# Patient Record
Sex: FEMALE | Race: WHITE | NOT HISPANIC OR LATINO | Employment: FULL TIME | URBAN - METROPOLITAN AREA
[De-identification: names, ages, dates, MRNs, and addresses within clinical notes are randomized per-mention and may not be internally consistent; named-entity substitution may affect disease eponyms.]

---

## 2017-10-02 ENCOUNTER — OFFICE VISIT (OUTPATIENT)
Dept: MEDICAL GROUP | Facility: PHYSICIAN GROUP | Age: 27
End: 2017-10-02
Payer: COMMERCIAL

## 2017-10-02 VITALS
HEIGHT: 68 IN | SYSTOLIC BLOOD PRESSURE: 128 MMHG | TEMPERATURE: 98.5 F | WEIGHT: 146 LBS | DIASTOLIC BLOOD PRESSURE: 90 MMHG | HEART RATE: 100 BPM | BODY MASS INDEX: 22.13 KG/M2 | OXYGEN SATURATION: 100 %

## 2017-10-02 DIAGNOSIS — Z78.9 USES BIRTH CONTROL: ICD-10-CM

## 2017-10-02 DIAGNOSIS — Z86.69 HISTORY OF MIGRAINE HEADACHES: ICD-10-CM

## 2017-10-02 DIAGNOSIS — R03.0 ELEVATED BLOOD-PRESSURE READING WITHOUT DIAGNOSIS OF HYPERTENSION: ICD-10-CM

## 2017-10-02 PROCEDURE — 99204 OFFICE O/P NEW MOD 45 MIN: CPT | Performed by: PHYSICIAN ASSISTANT

## 2017-10-02 RX ORDER — RIZATRIPTAN BENZOATE 10 MG/1
10 TABLET ORAL
COMMUNITY
End: 2017-10-02 | Stop reason: SDUPTHER

## 2017-10-02 RX ORDER — RIZATRIPTAN BENZOATE 10 MG/1
10 TABLET ORAL
Qty: 10 TAB | Refills: 3 | Status: SHIPPED | OUTPATIENT
Start: 2017-10-02 | End: 2018-01-13 | Stop reason: SDUPTHER

## 2017-10-02 ASSESSMENT — ENCOUNTER SYMPTOMS
HEADACHES: 1
CONSTIPATION: 0
SHORTNESS OF BREATH: 0
DIARRHEA: 0
DIZZINESS: 0
BACK PAIN: 0
WEIGHT LOSS: 0
BLURRED VISION: 0
EYE PAIN: 0
ABDOMINAL PAIN: 0
PSYCHIATRIC NEGATIVE: 1
DOUBLE VISION: 0
VOMITING: 0
NECK PAIN: 0
NAUSEA: 0

## 2017-10-02 ASSESSMENT — PATIENT HEALTH QUESTIONNAIRE - PHQ9: CLINICAL INTERPRETATION OF PHQ2 SCORE: 0

## 2017-10-02 NOTE — ASSESSMENT & PLAN NOTE
"Patient endorses several blood pressure elevations the last 2 months. The first was when she had some dental work done. Was told her BP was \"140-something\" over 98. Then was in urgent care and it was 140s/90s. Was checked most recently at work and was 159/105. Patient states she occasionally has felt headaches when BP was up. She denies any chest pain, dizziness, SOB, or any visual symptoms. No previous h/o HTN.  "

## 2017-10-02 NOTE — ASSESSMENT & PLAN NOTE
"Started around 2008. Typically experiences 1-2 migraines per month, historically improved with Maxalt 10 mg. Has been out of medication for the last 3 weeks. Since then, has had a total of 3 headaches, for which she's taken Excedrin and Ibuprofen. These medications haven't been successful in completely resolving her headaches. Is requesting refill of her Maxalt. Patient denies any prodromal symptoms. Typically gets nausea and \"flashing lights\" in vision when she gets the headaches.  "

## 2017-10-02 NOTE — PROGRESS NOTES
"Tiffany Francois is a 26 y.o. female here for elevated blood pressure, migraines, birth control refill.  HPI:  Patient recently moved to the area 2 months ago from [place], [place]. She denies having a previous PCP and would like to establish care given recent concerns about her blood pressure and ongoing management of her migraine headaches.    She has the following current medical problems:    Uses birth control  Patient on Lo-Loestrin Fe. Needing refills today.    History of migraine headaches  Started around 2008. Typically experiences 1-2 migraines per month, historically improved with Maxalt 10 mg. Has been out of medication for the last 3 weeks. Since then, has had a total of 3 headaches, for which she's taken Excedrin and Ibuprofen. These medications haven't been successful in completely resolving her headaches. Is requesting refill of her Maxalt. Patient denies any prodromal symptoms. Typically gets nausea and \"flashing lights\" in vision when she gets the headaches.    Elevated blood-pressure reading without diagnosis of hypertension  Patient endorses several blood pressure elevations the last 2 months. The first was when she had some dental work done. Was told her BP was \"140-something\" over 98. Then was in urgent care and it was 140s/90s. Was checked most recently at work and was 159/105. Patient states she occasionally has felt headaches when BP was up. She denies any chest pain, dizziness, SOB, or any visual symptoms. No previous h/o HTN.    Current medicines (including changes today)  Current Outpatient Prescriptions   Medication Sig Dispense Refill   • Norethin-Eth Estrad-Fe Biphas (LO LOESTRIN FE) 1 MG-10 MCG / 10 MCG Tab Take 1 Tab by mouth every day. 28 Tab 11   • rizatriptan (MAXALT) 10 MG tablet Take 1 Tab by mouth Once PRN for Migraine. 10 Tab 3     No current facility-administered medications for this visit.      She  has no past medical history on file.  She  has a past surgical history " "that includes appendectomy.  Social History   Substance Use Topics   • Smoking status: Never Smoker   • Smokeless tobacco: Never Used   • Alcohol use Yes      Comment: occassional - twice monthly at most     Social History     Social History Narrative   • No narrative on file     Family History   Problem Relation Age of Onset   • Hypertension Maternal Aunt    • Diabetes Maternal Uncle    • Hypertension Maternal Uncle    • Diabetes Paternal Grandfather    • Heart Disease Paternal Grandfather    • No Known Problems Mother    • No Known Problems Father      Family Status   Relation Status   • Maternal Aunt    • Maternal Uncle    • Paternal Grandfather    • Mother Alive   • Father Alive       Review of Systems   Constitutional: Negative for weight loss.   Eyes: Negative for blurred vision, double vision and pain.   Respiratory: Negative for shortness of breath.    Cardiovascular: Negative for chest pain.   Gastrointestinal: Negative for abdominal pain, constipation, diarrhea, nausea and vomiting.   Genitourinary: Negative for dysuria and frequency.   Musculoskeletal: Negative for back pain, joint pain and neck pain.   Neurological: Positive for headaches. Negative for dizziness.   Endo/Heme/Allergies: Negative for environmental allergies.   Psychiatric/Behavioral: Negative.           Objective:     Blood pressure 128/90, pulse 100, temperature 36.9 °C (98.5 °F), height 1.727 m (5' 8\"), weight 66.2 kg (146 lb), last menstrual period 04/02/2017, SpO2 100 %, not currently breastfeeding. Body mass index is 22.2 kg/m².  Physical Exam:    Constitutional: Alert, no distress.  Skin: Warm, dry, good turgor, no rashes in visible areas.  Eye: Equal, round and reactive, conjunctiva clear, lids normal.  ENMT: Lips without lesions, good dentition, oropharynx clear.  Neck: Trachea midline, no masses, no thyromegaly. No cervical or submandibular lymphadenopathy.  Respiratory: Unlabored respiratory effort, lungs clear to auscultation, " no wheezes, no ronchi.  Cardiovascular: Normal S1, S2, no murmur, no edema.  Abdomen: Normoactive bowel sounds. Soft, non-tender, no masses, no hepatosplenomegaly.  Psych: Alert and oriented x3, normal affect and mood.        Assessment and Plan:   The following treatment plan was discussed    1. Elevated blood-pressure reading without diagnosis of hypertension  BP today in the office is 128/90. Will have patient start recording BP at home every day and bring readings into the office in about 1 month. If persistently elevated, will plan to start on BP medication at that time.    2. History of migraine headaches  Patient's Maxalt refilled. Patient advised that if she begins having more than a few headaches per month, should let us know so we can discuss preventative treatment.  - rizatriptan (MAXALT) 10 MG tablet; Take 1 Tab by mouth Once PRN for Migraine.  Dispense: 10 Tab; Refill: 3    3. Uses birth control  Patient has done well on Lo Loestrin Fe and would like this refilled. She does have migraine with visual aura, which does increase stroke risk, but is nonsmoker and on BC with very low-dose estrogen component. Will continue for now but will likely recommend alternative BC method if blood pressure remains elevated.   - Norethin-Eth Estrad-Fe Biphas (LO LOESTRIN FE) 1 MG-10 MCG / 10 MCG Tab; Take 1 Tab by mouth every day.  Dispense: 28 Tab; Refill: 11      Followup: Return in about 1 month (around 11/2/2017) for BP f/u; Short.    Teodora Morgan P.A.-C.

## 2018-01-13 DIAGNOSIS — Z86.69 HISTORY OF MIGRAINE HEADACHES: ICD-10-CM

## 2018-01-15 RX ORDER — RIZATRIPTAN BENZOATE 10 MG/1
TABLET ORAL
Qty: 10 TAB | Refills: 3 | Status: SHIPPED | OUTPATIENT
Start: 2018-01-15 | End: 2018-07-25 | Stop reason: SDUPTHER

## 2018-02-16 ENCOUNTER — OFFICE VISIT (OUTPATIENT)
Dept: MEDICAL GROUP | Facility: PHYSICIAN GROUP | Age: 28
End: 2018-02-16
Payer: COMMERCIAL

## 2018-02-16 VITALS
SYSTOLIC BLOOD PRESSURE: 150 MMHG | WEIGHT: 145 LBS | TEMPERATURE: 98.6 F | BODY MASS INDEX: 21.98 KG/M2 | HEART RATE: 110 BPM | HEIGHT: 68 IN | OXYGEN SATURATION: 95 % | DIASTOLIC BLOOD PRESSURE: 110 MMHG

## 2018-02-16 DIAGNOSIS — I10 ESSENTIAL HYPERTENSION: ICD-10-CM

## 2018-02-16 PROCEDURE — 99214 OFFICE O/P EST MOD 30 MIN: CPT | Performed by: PHYSICIAN ASSISTANT

## 2018-02-16 RX ORDER — LISINOPRIL AND HYDROCHLOROTHIAZIDE 12.5; 1 MG/1; MG/1
1 TABLET ORAL DAILY
Qty: 30 TAB | Refills: 2 | Status: SHIPPED | OUTPATIENT
Start: 2018-02-16 | End: 2019-08-13

## 2018-02-16 NOTE — PATIENT INSTRUCTIONS
Hydrochlorothiazide, HCTZ; Lisinopril tablets  What is this medicine?  HYDROCHLOROTHIAZIDE; LISINOPRIL (maria fernanda droe klor oh THYE a zide; lyse IN oh pril) is a combination of a diuretic and an ACE inhibitor. It is used to treat high blood pressure.  This medicine may be used for other purposes; ask your health care provider or pharmacist if you have questions.  COMMON BRAND NAME(S): Prinzide, Zestoretic  What should I tell my health care provider before I take this medicine?  They need to know if you have any of these conditions:  -bone marrow disease  -decreased urine  -heart or blood vessel disease  -if you are on a special diet like a low salt diet  -immune system problems, like lupus  -kidney disease  -liver disease  -previous swelling of the tongue, face, or lips with difficulty breathing, difficulty swallowing, hoarseness, or tightening of the throat  -recent heart attack or stroke  -an unusual or allergic reaction to lisinopril, hydrochlorothiazide, sulfa drugs, other medicines, insect venom, foods, dyes, or preservatives  -pregnant or trying to get pregnant  -breast-feeding  How should I use this medicine?  Take this medicine by mouth with a glass of water. Follow the directions on the prescription label. You can take it with or without food. If it upsets your stomach, take it with food. Take your medicine at regular intervals. Do not take it more often than directed. Do not stop taking except on your doctor's advice.  Talk to your pediatrician regarding the use of this medicine in children. Special care may be needed.  Overdosage: If you think you have taken too much of this medicine contact a poison control center or emergency room at once.  NOTE: This medicine is only for you. Do not share this medicine with others.  What if I miss a dose?  If you miss a dose, take it as soon as you can. If it is almost time for your next dose, take only that dose. Do not take double or extra doses.  What may interact with  this medicine?  -barbiturates like phenobarbital  -blood pressure medicines  -corticosteroids like prednisone  -diabetic medications  -diuretics, especially triamterene, spironolactone or amiloride  -lithium  -NSAIDs like ibuprofen  -potassium salts or potassium supplements  -prescription pain medicines  -skeletal muscle relaxants like tubocurarine  -some cholesterol lowering medications like cholestyramine or colestipol  This list may not describe all possible interactions. Give your health care provider a list of all the medicines, herbs, non-prescription drugs, or dietary supplements you use. Also tell them if you smoke, drink alcohol, or use illegal drugs. Some items may interact with your medicine.  What should I watch for while using this medicine?  Visit your doctor or health care professional for regular checks on your progress. Check your blood pressure as directed. Ask your doctor or health care professional what your blood pressure should be and when you should contact him or her. Call your doctor or health care professional if you notice an irregular or fast heart beat.  You must not get dehydrated. Ask your doctor or health care professional how much fluid you need to drink a day. Check with him or her if you get an attack of severe diarrhea, nausea and vomiting, or if you sweat a lot. The loss of too much body fluid can make it dangerous for you to take this medicine.  Women should inform their doctor if they wish to become pregnant or think they might be pregnant. There is a potential for serious side effects to an unborn child. Talk to your health care professional or pharmacist for more information.  You may get drowsy or dizzy. Do not drive, use machinery, or do anything that needs mental alertness until you know how this drug affects you. Do not stand or sit up quickly, especially if you are an older patient. This reduces the risk of dizzy or fainting spells. Alcohol can make you more drowsy and  dizzy. Avoid alcoholic drinks.  This medicine may affect your blood sugar level. If you have diabetes, check with your doctor or health care professional before changing the dose of your diabetic medicine.  Avoid salt substitutes unless you are told otherwise by your doctor or health care professional.  This medicine can make you more sensitive to the sun. Keep out of the sun. If you cannot avoid being in the sun, wear protective clothing and use sunscreen. Do not use sun lamps or tanning beds/booths.  Do not treat yourself for coughs, colds, or pain while you are taking this medicine without asking your doctor or health care professional for advice. Some ingredients may increase your blood pressure.  What side effects may I notice from receiving this medicine?  Side effects that you should report to your doctor or health care professional as soon as possible:  -changes in vision  -confusion, dizziness, light headedness or fainting spells  -decreased amount of urine passed  -difficulty breathing or swallowing, hoarseness, or tightening of the throat  -eye pain  -fast or irregular heart beat, palpitations, or chest pain  -muscle cramps  -nausea and vomiting  -persistent dry cough  -redness, blistering, peeling or loosening of the skin, including inside the mouth  -stomach pain  -swelling of your face, lips, tongue, hands, or feet  -unusual rash, bleeding or bruising, or pinpoint red spots on the skin  -worsened gout pain  -yellowing of the eyes or skin  Side effects that usually do not require medical attention (report to your doctor or health care professional if they continue or are bothersome):  -change in sex drive or performance  -cough  -headache  This list may not describe all possible side effects. Call your doctor for medical advice about side effects. You may report side effects to FDA at 3-502-FDA-9618.  Where should I keep my medicine?  Keep out of the reach of children.  Store at room temperature between  20 and 25 degrees C (68 and 77 degrees F). Protect from moisture and excessive light. Keep container tightly closed. Throw away any unused medicine after the expiration date.  NOTE: This sheet is a summary. It may not cover all possible information. If you have questions about this medicine, talk to your doctor, pharmacist, or health care provider.  © 2014, [employer]. (9/7/2011 1:33:52 PM)

## 2018-02-16 NOTE — PROGRESS NOTES
"Subjective:   Tiffany Francois is a 27 y.o. female here today for follow-up on BP. Is an established patient of mine.      HPI:    Patient presents to the office today for a follow-up on her blood pressures. At the last visit, I had asked her to start checking at home. She states that she has noticed persistent elevation especially over the past week. She started checking more frequently when she was having flushing and dizzy episodes. Has also been having more frequent headaches. Is unsure if these are her normal migraines are due to the elevated blood pressure.Has found that her blood pressure has been running anywhere between 140-160 systolic over around 100 diastolic. She is not currently on any medication. She denies any additional symptoms such as blurry vision, chest pain, palpitations, or shortness of breath. Does endorse family history of hypertension.       Current medicines (including changes today)  Current Outpatient Prescriptions   Medication Sig Dispense Refill   • lisinopril-hydrochlorothiazide (PRINZIDE, ZESTORETIC) 10-12.5 MG per tablet Take 1 Tab by mouth every day. 30 Tab 2   • rizatriptan (MAXALT) 10 MG tablet TAKE 1 TABLET BY MOUTH ONCE AS NEEDED FOR MIGRAINE 10 Tab 3   • Norethin-Eth Estrad-Fe Biphas (LO LOESTRIN FE) 1 MG-10 MCG / 10 MCG Tab Take 1 Tab by mouth every day. 28 Tab 11     No current facility-administered medications for this visit.      She  has no past medical history on file.    ROS  As per HPI.       Objective:     Blood pressure 150/110, pulse (!) 110, temperature 37 °C (98.6 °F), height 1.727 m (5' 8\"), weight 65.8 kg (145 lb), SpO2 95 %. Body mass index is 22.05 kg/m².   Physical Exam:  Constitutional: Alert, well-appearing, no distress.  Skin: No rashes in visible areas.  Eye: Pupils are equal and round, conjunctiva clear, lids normal.  ENMT: Lips without lesions, moist mucus membranes.  Neck: No masses. No submandibular or cervical lymphadenopathy. No palpable " thyromegaly.  Respiratory: Unlabored respiratory effort, lungs clear to auscultation, no wheezes, no rhonchi.  Cardiovascular: Normal S1, S2, no murmur, no lower extremity edema.      Assessment and Plan:   The following treatment plan was discussed    1. Essential hypertension  New diagnosis of hypertension. Blood pressure initially on arrival was 150/110. On recheck by myself, was 144/110. Explained to patient that she needs to be started on hypertensive medication. I'm going to initiate lisinopril-hydrochlorothiazide 10/12.5 mg daily. She should continue checking blood pressures at home and bring to next office visit. We'll have her follow-up for MA blood pressure recheck in 2 weeks with follow-up in the office with myself in 6 weeks. I see that she has never had any type of screening lab work done, so basic panel was ordered. She is advised to have this done prior to her next office visit.  - lisinopril-hydrochlorothiazide (PRINZIDE, ZESTORETIC) 10-12.5 MG per tablet; Take 1 Tab by mouth every day.  Dispense: 30 Tab; Refill: 2  - CBC WITH DIFFERENTIAL; Future  - COMP METABOLIC PANEL; Future  - LIPID PROFILE; Future  - TSH WITH REFLEX TO FT4; Future      Followup: Return in about 2 weeks (around 3/2/2018) for MA BP re-check; Short.    Teodora Morgan P.A.-C.

## 2018-03-02 ENCOUNTER — HOSPITAL ENCOUNTER (OUTPATIENT)
Dept: LAB | Facility: MEDICAL CENTER | Age: 28
End: 2018-03-02
Attending: PHYSICIAN ASSISTANT
Payer: COMMERCIAL

## 2018-03-02 ENCOUNTER — TELEPHONE (OUTPATIENT)
Dept: MEDICAL GROUP | Facility: PHYSICIAN GROUP | Age: 28
End: 2018-03-02

## 2018-03-02 ENCOUNTER — NON-PROVIDER VISIT (OUTPATIENT)
Dept: MEDICAL GROUP | Facility: PHYSICIAN GROUP | Age: 28
End: 2018-03-02
Payer: COMMERCIAL

## 2018-03-02 VITALS — DIASTOLIC BLOOD PRESSURE: 76 MMHG | SYSTOLIC BLOOD PRESSURE: 120 MMHG

## 2018-03-02 DIAGNOSIS — I10 ESSENTIAL HYPERTENSION: ICD-10-CM

## 2018-03-02 LAB
ALBUMIN SERPL BCP-MCNC: 4.8 G/DL (ref 3.2–4.9)
ALBUMIN/GLOB SERPL: 1.5 G/DL
ALP SERPL-CCNC: 81 U/L (ref 30–99)
ALT SERPL-CCNC: 17 U/L (ref 2–50)
ANION GAP SERPL CALC-SCNC: 4 MMOL/L (ref 0–11.9)
AST SERPL-CCNC: 21 U/L (ref 12–45)
BASOPHILS # BLD AUTO: 0.6 % (ref 0–1.8)
BASOPHILS # BLD: 0.03 K/UL (ref 0–0.12)
BILIRUB SERPL-MCNC: 0.3 MG/DL (ref 0.1–1.5)
BUN SERPL-MCNC: 12 MG/DL (ref 8–22)
CALCIUM SERPL-MCNC: 9.8 MG/DL (ref 8.5–10.5)
CHLORIDE SERPL-SCNC: 104 MMOL/L (ref 96–112)
CHOLEST SERPL-MCNC: 151 MG/DL (ref 100–199)
CO2 SERPL-SCNC: 28 MMOL/L (ref 20–33)
CREAT SERPL-MCNC: 0.69 MG/DL (ref 0.5–1.4)
EOSINOPHIL # BLD AUTO: 0.1 K/UL (ref 0–0.51)
EOSINOPHIL NFR BLD: 2 % (ref 0–6.9)
ERYTHROCYTE [DISTWIDTH] IN BLOOD BY AUTOMATED COUNT: 39.6 FL (ref 35.9–50)
GLOBULIN SER CALC-MCNC: 3.2 G/DL (ref 1.9–3.5)
GLUCOSE SERPL-MCNC: 86 MG/DL (ref 65–99)
HCT VFR BLD AUTO: 42.5 % (ref 37–47)
HDLC SERPL-MCNC: 53 MG/DL
HGB BLD-MCNC: 13.5 G/DL (ref 12–16)
IMM GRANULOCYTES # BLD AUTO: 0.01 K/UL (ref 0–0.11)
IMM GRANULOCYTES NFR BLD AUTO: 0.2 % (ref 0–0.9)
LDLC SERPL CALC-MCNC: 73 MG/DL
LYMPHOCYTES # BLD AUTO: 2.11 K/UL (ref 1–4.8)
LYMPHOCYTES NFR BLD: 43 % (ref 22–41)
MCH RBC QN AUTO: 29.3 PG (ref 27–33)
MCHC RBC AUTO-ENTMCNC: 31.8 G/DL (ref 33.6–35)
MCV RBC AUTO: 92.4 FL (ref 81.4–97.8)
MONOCYTES # BLD AUTO: 0.39 K/UL (ref 0–0.85)
MONOCYTES NFR BLD AUTO: 7.9 % (ref 0–13.4)
NEUTROPHILS # BLD AUTO: 2.27 K/UL (ref 2–7.15)
NEUTROPHILS NFR BLD: 46.3 % (ref 44–72)
NRBC # BLD AUTO: 0 K/UL
NRBC BLD-RTO: 0 /100 WBC
PLATELET # BLD AUTO: 247 K/UL (ref 164–446)
PMV BLD AUTO: 11.6 FL (ref 9–12.9)
POTASSIUM SERPL-SCNC: 4.1 MMOL/L (ref 3.6–5.5)
PROT SERPL-MCNC: 8 G/DL (ref 6–8.2)
RBC # BLD AUTO: 4.6 M/UL (ref 4.2–5.4)
SODIUM SERPL-SCNC: 136 MMOL/L (ref 135–145)
TRIGL SERPL-MCNC: 127 MG/DL (ref 0–149)
TSH SERPL DL<=0.005 MIU/L-ACNC: 1.54 UIU/ML (ref 0.38–5.33)
WBC # BLD AUTO: 4.9 K/UL (ref 4.8–10.8)

## 2018-03-02 PROCEDURE — 84443 ASSAY THYROID STIM HORMONE: CPT

## 2018-03-02 PROCEDURE — 36415 COLL VENOUS BLD VENIPUNCTURE: CPT

## 2018-03-02 PROCEDURE — 85025 COMPLETE CBC W/AUTO DIFF WBC: CPT

## 2018-03-02 PROCEDURE — 80061 LIPID PANEL: CPT

## 2018-03-02 PROCEDURE — 80053 COMPREHEN METABOLIC PANEL: CPT

## 2018-03-02 NOTE — PROGRESS NOTES
Tiffany Francois is a 27 y.o. female here for a non-provider visit for BP Check     Vitals:    03/02/18 1031   BP: 120/76     If abnormal, was an in office provider notified today? No was not abnormal notified Teodora Morgan   Routed to PCP? No

## 2018-03-03 NOTE — TELEPHONE ENCOUNTER
----- Message from Teodora Morgan P.A.-C. sent at 3/2/2018  4:36 PM PST -----  Please inform patient that her recent labs show nothing of concern.  Teodora Morgan P.A.-C.

## 2018-03-29 ENCOUNTER — TELEPHONE (OUTPATIENT)
Dept: MEDICAL GROUP | Facility: PHYSICIAN GROUP | Age: 28
End: 2018-03-29

## 2018-03-29 NOTE — TELEPHONE ENCOUNTER
Future Appointments       Provider Department Center    3/30/2018 9:25 AM Teodora Morgan P.A.-C. [facility]        ESTABLISHED PATIENT PRE-VISIT PLANNING     Note: Patient will not be contacted if there is no indication to call.     1.  Reviewed notes from the last few office visits within the medical group: Yes 02/16/2018    2.  If any orders were placed at last visit or intended to be done for this visit (i.e. 6 mos follow-up), do we have Results/Consult Notes?        •  Labs - Labs ordered, completed on 03/02/2018 and results are in chart.       •  Imaging - Imaging was not ordered at last office visit.       •  Referrals - No referrals were ordered at last office visit.    3. Is this appointment scheduled as a Hospital Follow-Up? No    4.  Immunizations were updated in [place] using WebIZ?: Yes       •  Web Iz Recommendations: FLU and TDAP    5.  Patient is due for the following Health Maintenance Topics:   Health Maintenance Due   Topic Date Due   • IMM DTaP/Tdap/Td Vaccine (1 - Tdap) 12/09/2008   • PAP SMEAR  12/09/2010   • IMM INFLUENZA (1) 09/01/2017       6.  MDX printed for Provider? NO INS BCBS     7.  Patient was NOT informed to arrive 15 min prior to their scheduled appointment and bring in their medication bottles. VM Full Could not LVM

## 2018-07-25 DIAGNOSIS — Z86.69 HISTORY OF MIGRAINE HEADACHES: ICD-10-CM

## 2018-07-26 RX ORDER — RIZATRIPTAN BENZOATE 10 MG/1
TABLET ORAL
Qty: 10 TAB | Refills: 0 | Status: SHIPPED | OUTPATIENT
Start: 2018-07-26 | End: 2018-08-14 | Stop reason: SDUPTHER

## 2018-07-26 NOTE — TELEPHONE ENCOUNTER
Was the patient seen in the last year in this department? Yes    Does patient have an active prescription for medications requested? No     Received Request Via: Pharmacy      Pt met protocol?: Yes    LAST OV 02/16/2018

## 2018-08-14 DIAGNOSIS — Z86.69 HISTORY OF MIGRAINE HEADACHES: ICD-10-CM

## 2018-08-15 RX ORDER — RIZATRIPTAN BENZOATE 10 MG/1
TABLET ORAL
Qty: 10 TAB | Refills: 1 | Status: SHIPPED | OUTPATIENT
Start: 2018-08-15 | End: 2018-10-21 | Stop reason: SDUPTHER

## 2018-08-15 NOTE — TELEPHONE ENCOUNTER
Was the patient seen in the last year in this department? Yes    Does patient have an active prescription for medications requested? No     Received Request Via: Pharmacy    Pt met protocol?: Yes     Last OV 02/2018

## 2018-08-17 ENCOUNTER — TELEPHONE (OUTPATIENT)
Dept: MEDICAL GROUP | Facility: PHYSICIAN GROUP | Age: 28
End: 2018-08-17

## 2018-08-17 NOTE — TELEPHONE ENCOUNTER
MEDICATION PRIOR AUTHORIZATION NEEDED:    1. Name of Medication: MAXALT 10 MG    2. Requested By (Name of Pharmacy): [facility]     3. Is insurance on file current? YES    4. What is the name & phone number of the 3rd party payor? BRANDIN DOS SANTOS

## 2018-08-29 DIAGNOSIS — Z78.9 USES BIRTH CONTROL: ICD-10-CM

## 2018-08-29 RX ORDER — NORETHINDRONE ACETATE AND ETHINYL ESTRADIOL, ETHINYL ESTRADIOL AND FERROUS FUMARATE 1MG-10(24)
KIT ORAL
Qty: 84 TAB | Refills: 0 | Status: SHIPPED | OUTPATIENT
Start: 2018-08-29 | End: 2019-08-13

## 2018-10-21 DIAGNOSIS — Z86.69 HISTORY OF MIGRAINE HEADACHES: ICD-10-CM

## 2018-10-22 RX ORDER — RIZATRIPTAN BENZOATE 10 MG/1
TABLET ORAL
Qty: 9 TAB | Refills: 4 | Status: SHIPPED | OUTPATIENT
Start: 2018-10-22 | End: 2019-08-16 | Stop reason: SDUPTHER

## 2019-08-13 ENCOUNTER — HOSPITAL ENCOUNTER (OUTPATIENT)
Facility: MEDICAL CENTER | Age: 29
End: 2019-08-13
Attending: PHYSICIAN ASSISTANT
Payer: COMMERCIAL

## 2019-08-13 ENCOUNTER — OFFICE VISIT (OUTPATIENT)
Dept: MEDICAL GROUP | Facility: PHYSICIAN GROUP | Age: 29
End: 2019-08-13
Payer: COMMERCIAL

## 2019-08-13 VITALS
TEMPERATURE: 97.4 F | SYSTOLIC BLOOD PRESSURE: 132 MMHG | BODY MASS INDEX: 21.37 KG/M2 | HEART RATE: 110 BPM | DIASTOLIC BLOOD PRESSURE: 80 MMHG | HEIGHT: 68 IN | OXYGEN SATURATION: 98 % | WEIGHT: 141 LBS

## 2019-08-13 DIAGNOSIS — Z12.4 PAPANICOLAOU SMEAR: ICD-10-CM

## 2019-08-13 DIAGNOSIS — N89.8 VAGINAL DISCHARGE: ICD-10-CM

## 2019-08-13 DIAGNOSIS — Z78.9 USES BIRTH CONTROL: ICD-10-CM

## 2019-08-13 LAB — CYTOLOGY REG CYTOL: NORMAL

## 2019-08-13 PROCEDURE — 87591 N.GONORRHOEAE DNA AMP PROB: CPT

## 2019-08-13 PROCEDURE — 87510 GARDNER VAG DNA DIR PROBE: CPT

## 2019-08-13 PROCEDURE — 88175 CYTOPATH C/V AUTO FLUID REDO: CPT

## 2019-08-13 PROCEDURE — 87660 TRICHOMONAS VAGIN DIR PROBE: CPT

## 2019-08-13 PROCEDURE — 87491 CHLMYD TRACH DNA AMP PROBE: CPT

## 2019-08-13 PROCEDURE — 99214 OFFICE O/P EST MOD 30 MIN: CPT | Performed by: PHYSICIAN ASSISTANT

## 2019-08-13 PROCEDURE — 87480 CANDIDA DNA DIR PROBE: CPT

## 2019-08-13 RX ORDER — ACETAMINOPHEN AND CODEINE PHOSPHATE 120; 12 MG/5ML; MG/5ML
1 SOLUTION ORAL DAILY
Qty: 84 TAB | Refills: 3 | Status: SHIPPED | OUTPATIENT
Start: 2019-08-13 | End: 2020-07-21

## 2019-08-13 ASSESSMENT — PATIENT HEALTH QUESTIONNAIRE - PHQ9: CLINICAL INTERPRETATION OF PHQ2 SCORE: 0

## 2019-08-13 NOTE — PATIENT INSTRUCTIONS
Norethindrone acetate (hormone replacement)  What is this medicine?  NORETHINDRONE ACETATE (nor eth IN drone AS e griffin) is a female hormone. This medicine is used to treat endometriosis, uterine bleeding caused by abnormal hormone levels, and secondary amenorrhea. Secondary amenorrhea is when a woman stops getting menstrual periods due to low levels of certain female hormones.  This medicine may be used for other purposes; ask your health care provider or pharmacist if you have questions.  COMMON BRAND NAME(S): Aygestin  What should I tell my health care provider before I take this medicine?  They need to know if you have any of these conditions:  -blood vessel disease or blood clots  -breast, cervical, or vaginal cancer  -diabetes  -heart disease  -kidney disease  -liver disease  -mental depression  -migraine  -seizures  -stroke  -vaginal bleeding  -an unusual or allergic reaction to norethindrone, other medicines, foods, dyes, or preservatives  -pregnant or trying to get pregnant  -breast-feeding  How should I use this medicine?  Take this medicine by mouth with a glass of water. You may take this medicine with or without food. Follow the directions on the prescription label. Take this medicine at the same time each day. Do not take your medicine more often than directed.  A patient information sheet will be given with each prescription and refill. Read this sheet carefully each time. The sheet may change frequently.  Talk to your pediatrician regarding the use of this medicine in children. Special care may be needed.  Overdosage: If you think you have taken too much of this medicine contact a poison control center or emergency room at once.  NOTE: This medicine is only for you. Do not share this medicine with others.  What if I miss a dose?  If you miss a dose, take it as soon as you can. If it is almost time for your next dose, take only that dose. Do not take double or extra doses.  What may interact with this  medicine?  Do not take this medicine with any of the following medications:  -amprenavir or fosamprenavir  -bosentan  This medicine may also interact with the following medications:  -antibiotics or medicines for infections, especially rifampin, rifabutin, rifapentine, and griseofulvin, and possibly penicillins or tetracyclines  -aprepitant  -barbiturate medicines, such as phenobarbital  -carbamazepine  -felbamate  -modafinil  -oxcarbazepine  -phenytoin  -ritonavir or other medicines for HIV infection or AIDS  -Pollo's wort  -topiramate  This list may not describe all possible interactions. Give your health care provider a list of all the medicines, herbs, non-prescription drugs, or dietary supplements you use. Also tell them if you smoke, drink alcohol, or use illegal drugs. Some items may interact with your medicine.  What should I watch for while using this medicine?  Visit your doctor or health care professional for regular checks on your progress. You will need a regular breast and pelvic exam and Pap smear while on this medicine.  If you have any reason to think you are pregnant, stop taking this medicine right away and contact your doctor or health care professional.  If you are taking this medicine for hormone related problems, it may take several cycles of use to see improvement in your condition.  What side effects may I notice from receiving this medicine?  Side effects that you should report to your doctor or health care professional as soon as possible:  -breast tenderness or discharge  -pain in the abdomen, chest, groin or leg  -severe headache  -skin rash, itching, or hives  -sudden shortness of breath  -unusually weak or tired  -vision or speech problems  -yellowing of skin or eyes  Side effects that usually do not require medical attention (report to your doctor or health care professional if they continue or are bothersome):  -changes in sexual desire  -change in menstrual flow  -facial hair  growth  -fluid retention and swelling  -headache  -irritability  -nausea  -weight gain or loss  This list may not describe all possible side effects. Call your doctor for medical advice about side effects. You may report side effects to FDA at 7-753-MXP-2098.  Where should I keep my medicine?  Keep out of the reach of children.  Store at room temperature between 15 and 30 degrees C (59 and 86 degrees F). Throw away any unused medicine after the expiration date.  NOTE: This sheet is a summary. It may not cover all possible information. If you have questions about this medicine, talk to your doctor, pharmacist, or health care provider.  © 2018 [employer]vier/Gold Standard (2009-07-13 14:38:36)

## 2019-08-13 NOTE — PROGRESS NOTES
"Subjective:   Tiffany Francois is a 28 y.o. female here today for vaginal discharge, birth control. Is an established patient of mine.    HPI:    Over the last week, has had thick vaginal discharge which is out of the norm for her. The last 3 days, vaginal discharge has been greenish in color. Recently started new sexual relationship with a man. Has one partner. Uses protection only some of the time. Has had intermittent cramping sensations in pelvis that feel like menstrual cramps. Denies itching/burning, genital lesions. Also requesting to do her pap smear. Believes her last one was > 3 years ago.    Also wishing to discuss alternative birth control options. Previously was on Lo Loestrin which she stopped about 6 months ago after reading that it can contribute to elevated blood pressure.  LMP about 3 weeks ago.      Current medicines (including changes today)  Current Outpatient Medications   Medication Sig Dispense Refill   • norethindrone (MICRONOR) 0.35 MG tablet Take 1 Tab by mouth every day. 84 Tab 3   • rizatriptan (MAXALT) 10 MG tablet TAKE 1 TABLET BY MOUTH ONCE AS NEEDED FOR MIGRAINE 9 Tab 4     No current facility-administered medications for this visit.      She  has no past medical history on file.    ROS  As per HPI.       Objective:     /80 (BP Location: Left arm, Patient Position: Sitting, BP Cuff Size: Adult)   Pulse (!) 110   Temp 36.3 °C (97.4 °F)   Ht 1.727 m (5' 8\")   Wt 64 kg (141 lb)   SpO2 98%  Body mass index is 21.44 kg/m².     Physical Exam:  Constitutional: Alert, well-appearing, no distress.  Skin: Warm, dry, good turgor, no rashes in visible areas.  Eye: Pupils are equal and round, conjunctiva clear, lids normal.  ENMT: Lips without lesions, moist mucus membranes.  Pelvic: Normal-appearing external genitalia. No genital lesions noted. On speculum insertion, vaginal walls have normal rugated appearance. NThere is a moderate amount of malodorous white discharge present. " Cervix easily visualized and appears pink, slightly friable. On bimanual exam, there is no cervical motion tenderness. Uterus is mobile, non-tender and without palpable masses. No adnexal masses or tenderness.      Assessment and Plan:   The following treatment plan was discussed    1. Vaginal discharge  New problem, uncontrolled. Will test for GC/Chlamydia and vaginal pathogens. Further treatment pending those results.  - CHLAMYDIA/GC PCR URINE OR SWAB; Future  - VAGINAL PATHOGENS DNA PANEL; Future    2. Uses birth control  Discussed progesterone-only birth control options with patient, as I agree that given her blood pressure issues as well as her migraine headaches, should stay away from estrogen-based birth control.  Counseled patient on progesterone-only pill, Depo-Provera, IUD, and Nexplanon.  At this point, she wishes to stay with the pill method.  I have prescribed norethindrone.  Counseled to start on first day of next menstrual period.  Possibility of irregular bleeding discussed.  - norethindrone (MICRONOR) 0.35 MG tablet; Take 1 Tab by mouth every day.  Dispense: 84 Tab; Refill: 3    3. Papanicolaou smear  Pap smear collected today.  Patient will be notified of results when back.      Followup: Return in about 3 months (around 11/13/2019).    Teodora Morgan P.A.-C.

## 2019-08-14 LAB
CANDIDA DNA VAG QL PROBE+SIG AMP: NEGATIVE
G VAGINALIS DNA VAG QL PROBE+SIG AMP: POSITIVE
T VAGINALIS DNA VAG QL PROBE+SIG AMP: NEGATIVE

## 2019-08-15 LAB
C TRACH DNA GENITAL QL NAA+PROBE: NEGATIVE
N GONORRHOEA DNA GENITAL QL NAA+PROBE: NEGATIVE
SPECIMEN SOURCE: NORMAL

## 2019-08-16 DIAGNOSIS — B96.89 GARDNERELLA VAGINALIS INFECTION: ICD-10-CM

## 2019-08-16 DIAGNOSIS — N76.0 GARDNERELLA VAGINALIS INFECTION: ICD-10-CM

## 2019-08-16 DIAGNOSIS — Z86.69 HISTORY OF MIGRAINE HEADACHES: ICD-10-CM

## 2019-08-16 RX ORDER — RIZATRIPTAN BENZOATE 10 MG/1
TABLET ORAL
Qty: 9 TAB | Refills: 5 | Status: SHIPPED | OUTPATIENT
Start: 2019-08-16 | End: 2020-01-22 | Stop reason: SDUPTHER

## 2019-08-16 RX ORDER — METRONIDAZOLE 500 MG/1
500 TABLET ORAL 2 TIMES DAILY
Qty: 14 TAB | Refills: 0 | Status: SHIPPED | OUTPATIENT
Start: 2019-08-16 | End: 2019-08-23

## 2020-01-22 ENCOUNTER — OFFICE VISIT (OUTPATIENT)
Dept: MEDICAL GROUP | Facility: PHYSICIAN GROUP | Age: 30
End: 2020-01-22
Payer: COMMERCIAL

## 2020-01-22 VITALS
HEIGHT: 68 IN | SYSTOLIC BLOOD PRESSURE: 130 MMHG | OXYGEN SATURATION: 98 % | WEIGHT: 146.4 LBS | BODY MASS INDEX: 22.19 KG/M2 | TEMPERATURE: 98.4 F | HEART RATE: 104 BPM | DIASTOLIC BLOOD PRESSURE: 90 MMHG

## 2020-01-22 DIAGNOSIS — G43.909 MIGRAINE WITHOUT STATUS MIGRAINOSUS, NOT INTRACTABLE, UNSPECIFIED MIGRAINE TYPE: ICD-10-CM

## 2020-01-22 DIAGNOSIS — I10 ESSENTIAL HYPERTENSION: ICD-10-CM

## 2020-01-22 PROCEDURE — 99214 OFFICE O/P EST MOD 30 MIN: CPT | Performed by: PHYSICIAN ASSISTANT

## 2020-01-22 RX ORDER — RIZATRIPTAN BENZOATE 10 MG/1
TABLET ORAL
Qty: 9 TAB | Refills: 5 | Status: SHIPPED | OUTPATIENT
Start: 2020-01-22 | End: 2020-05-28

## 2020-01-22 RX ORDER — LOSARTAN POTASSIUM 25 MG/1
25 TABLET ORAL DAILY
Qty: 30 TAB | Refills: 5 | Status: SHIPPED | OUTPATIENT
Start: 2020-01-22 | End: 2020-07-16

## 2020-01-22 NOTE — PROGRESS NOTES
Subjective:   Tiffany Francois is a 29 y.o. female here today for follow-up on migraines, hypertension. Is an established patient of mine.    HPI:    Patient presents to the office today for follow-up regarding the following:    -hypertension.  Patient was started on lisinopril-hydrochlorothiazide 10-12.5 mg daily at appointment back in February 2018.  She states that she stopped taking this due to bothersome cough and was switched to low-dose losartan at some point by doctor at [place]. She stopped taking it after a period of time--unsure when exactly she stopped. States was only able to tolerate 25 mg daily--50 mg dose made her feel dizzy and lightheaded. She has recently started checking BP at home again. Brings log for part of the month of December. Readings have been averaging high 130s-low 140s systolic over 80s-90s diastolic. BP today in the office is 130/90.    -Also here to follow-up on migraine headaches.  She has been having ongoing issues with this since 2008.  Currently experiencing increased frequency of headaches. States that last month she went through all 9 of her allotted Maxalt tablets in the first 3 weeks. Hard for her to pinpoint exactly how many per month because she's had some new types of headache in the back of her head that she feels are more tension in nature. Endorses increased stress at work. Has noticed that she's been grinding her teeth at night. Is consistently wearing mouth guard. Also using heated massage pillow. Before taking the Maxalt, will take either Excedrin or Motrin. Has found that she is taking these on a frequent basis which she knows is not ideal. Estimates Maxalt usage at least twice weekly, either 1/2 or 1 tablet.       Current medicines (including changes today)  Current Outpatient Medications   Medication Sig Dispense Refill   • losartan (COZAAR) 25 MG Tab Take 1 Tab by mouth every day. 30 Tab 5   • rizatriptan (MAXALT) 10 MG tablet TAKE 1 TABLET BY MOUTH ONCE  "AS NEEDED FOR MIGRAINE 9 Tab 5   • norethindrone (MICRONOR) 0.35 MG tablet Take 1 Tab by mouth every day. 84 Tab 3     No current facility-administered medications for this visit.      She  has no past medical history on file.    ROS  As per HPI.       Objective:     /90 (BP Location: Left arm, Patient Position: Sitting)   Pulse (!) 104   Temp 36.9 °C (98.4 °F) (Temporal)   Ht 1.727 m (5' 8\")   Wt 66.4 kg (146 lb 6.4 oz)   SpO2 98%  Body mass index is 22.26 kg/m².   Physical Exam:  Constitutional: Alert, well-appearing, very pleasant, no distress.  Skin: No rashes in visible areas.  Eye: Pupils are equal and round, conjunctiva clear, lids normal.  ENMT: Lips without lesions, moist mucus membranes.  Neck: Normal-appearing, no edema.  Respiratory: Unlabored respiratory effort, lungs clear to auscultation, no wheezes, no rhonchi.  Cardiovascular: Normal S1, S2, no murmur.      Assessment and Plan:   The following treatment plan was discussed    1. Essential hypertension  Established problem, uncontrolled. BP today and at home are elevated. Discussed recommendation to re-start medication which she is agreeable with. Suspect that BP elevation is contributing to her worsening headaches as well. Will prescribe low-dose losartan given that she tolerated this well previously. Advised to continue logging BP at home and f/u in 4 weeks.  - losartan (COZAAR) 25 MG Tab; Take 1 Tab by mouth every day.  Dispense: 30 Tab; Refill: 5    2. Migraine without status migrainosus, not intractable, unspecified migraine type  Established problem, uncontrolled and worsening since last visit. OK to continue Maxalt PRN, but discussed recommendation to limit headache medication to no more than twice weekly to prevent rebound headache, which may be contributing to current problem. At this point, will hold off on preventative medication given that I suspect her headaches will improve with better BP control. She will send me MyChart " update in about 2 weeks and I'll plan to see her back in the office in 4 weeks for re-evaluation.  - rizatriptan (MAXALT) 10 MG tablet; TAKE 1 TABLET BY MOUTH ONCE AS NEEDED FOR MIGRAINE  Dispense: 9 Tab; Refill: 5      Followup: Return in about 4 weeks (around 2/19/2020) for f/u HTN, migraines.    Teodora Morgan P.A.-C.

## 2020-05-04 ENCOUNTER — APPOINTMENT (OUTPATIENT)
Dept: MEDICAL GROUP | Facility: PHYSICIAN GROUP | Age: 30
End: 2020-05-04
Payer: COMMERCIAL

## 2020-05-08 ENCOUNTER — TELEMEDICINE (OUTPATIENT)
Dept: MEDICAL GROUP | Facility: PHYSICIAN GROUP | Age: 30
End: 2020-05-08
Payer: COMMERCIAL

## 2020-05-08 VITALS
BODY MASS INDEX: 22.13 KG/M2 | HEIGHT: 68 IN | HEART RATE: 90 BPM | DIASTOLIC BLOOD PRESSURE: 85 MMHG | SYSTOLIC BLOOD PRESSURE: 133 MMHG | WEIGHT: 146 LBS

## 2020-05-08 DIAGNOSIS — M26.623 BILATERAL TEMPOROMANDIBULAR JOINT PAIN: ICD-10-CM

## 2020-05-08 DIAGNOSIS — Z13.29 SCREENING FOR THYROID DISORDER: ICD-10-CM

## 2020-05-08 DIAGNOSIS — Z13.21 ENCOUNTER FOR VITAMIN DEFICIENCY SCREENING: ICD-10-CM

## 2020-05-08 DIAGNOSIS — Z13.6 SCREENING FOR CARDIOVASCULAR CONDITION: ICD-10-CM

## 2020-05-08 DIAGNOSIS — G43.909 MIGRAINE WITHOUT STATUS MIGRAINOSUS, NOT INTRACTABLE, UNSPECIFIED MIGRAINE TYPE: ICD-10-CM

## 2020-05-08 DIAGNOSIS — I10 ESSENTIAL HYPERTENSION: ICD-10-CM

## 2020-05-08 DIAGNOSIS — Z13.228 SCREENING FOR METABOLIC DISORDER: ICD-10-CM

## 2020-05-08 PROCEDURE — 99214 OFFICE O/P EST MOD 30 MIN: CPT | Mod: 95,CR | Performed by: NURSE PRACTITIONER

## 2020-05-08 RX ORDER — PROPRANOLOL HYDROCHLORIDE 10 MG/1
10 TABLET ORAL 2 TIMES DAILY
Qty: 60 TAB | Refills: 2 | Status: SHIPPED | OUTPATIENT
Start: 2020-05-08 | End: 2020-06-03

## 2020-05-08 SDOH — HEALTH STABILITY: MENTAL HEALTH: HOW OFTEN DO YOU HAVE 6 OR MORE DRINKS ON ONE OCCASION?: NEVER

## 2020-05-08 SDOH — HEALTH STABILITY: MENTAL HEALTH: HOW MANY STANDARD DRINKS CONTAINING ALCOHOL DO YOU HAVE ON A TYPICAL DAY?: 1 OR 2

## 2020-05-08 SDOH — HEALTH STABILITY: MENTAL HEALTH: HOW OFTEN DO YOU HAVE A DRINK CONTAINING ALCOHOL?: MONTHLY OR LESS

## 2020-05-08 NOTE — PROGRESS NOTES
Telemedicine Visit: Established Patient     This encounter was conducted via Zoom .   Verbal consent was obtained. Patient's identity was verified.    Subjective:   CC: Migraines, hypertension  Tiffany Francois is a 29 y.o. female presenting for evaluation and management of:      Migraines: Patient has chronic migraines, progressively worsening and increasing in frequency over the past several months.  This started when she was in her early 20s and have continued since.  Does have a family history with migraines, her mother experiences them routinely.  Patient does not experience aura, when migraines begin that they usually start on one side of her jawline, migrate to the back of her neck and then up to her temples bilaterally.  Her dentist previously fit her with a nightguard due to severe teeth grinding.  She does experience spots in her vision during severe migraines.  She tries to take Motrin and Excedrin but gets minimal relief.  She is used Maxalt with moderate relief in the past, however it is quite sedating and with increasing frequency of her migraines this is not conducive to day-to-day functioning.  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  Does have a history of borderline hypertension, presumed to be a causative factor in her migraines.  Has taken losartan in the past, but experienced no difference in migraine presentation.    Hypertension: As mentioned above, borderline hypertension.  Patient does check her blood pressure 2-3 times per week and reports it is consistently 130s over 80s.  She lives a healthy lifestyle, is quite active and eats a balanced diet.  She is not currently taking hold her losartan.  She did not notice any difference in migraine reduction and overall wants to monitor her blood pressure without this agent.  Hoping to further reduce it through lifestyle measures.        ROS  "  Denies any recent fevers or chills. No nausea or vomiting. No chest pains or shortness of breath.     No Known Allergies    Current medicines (including changes today)  Current Outpatient Medications   Medication Sig Dispense Refill   • propranolol (INDERAL) 10 MG Tab Take 1 Tab by mouth 2 times a day. 60 Tab 2   • rizatriptan (MAXALT) 10 MG tablet TAKE 1 TABLET BY MOUTH ONCE AS NEEDED FOR MIGRAINE 9 Tab 5   • norethindrone (MICRONOR) 0.35 MG tablet Take 1 Tab by mouth every day. 84 Tab 3   • losartan (COZAAR) 25 MG Tab Take 1 Tab by mouth every day. (Patient not taking: Reported on 5/8/2020) 30 Tab 5     No current facility-administered medications for this visit.        Patient Active Problem List    Diagnosis Date Noted   • Uses birth control 10/02/2017   • Migraine headache 10/02/2017   • Essential hypertension 10/02/2017       Family History   Problem Relation Age of Onset   • Hypertension Maternal Aunt    • Diabetes Maternal Uncle    • Hypertension Maternal Uncle    • Diabetes Paternal Grandfather    • Heart Disease Paternal Grandfather    • No Known Problems Mother    • No Known Problems Father        She  has a past medical history of Hypertension and Migraine.  She  has a past surgical history that includes appendectomy.       Objective:   Vitals obtained by patient:  Vitals:    05/08/20 0912   BP: 133/85   Weight: 66.2 kg (146 lb)   Height: 1.727 m (5' 8\")           Physical Exam:  Constitutional: Alert, no distress, well-groomed.  Skin: No rashes in visible areas.  Eye: Round. Conjunctiva clear, lids normal. No icterus.   ENMT: Lips pink without lesions, good dentition, moist mucous membranes. Phonation normal.  Neck: No masses, no thyromegaly. Moves freely without pain.  CV: Pulse as reported by patient  Respiratory: Unlabored respiratory effort, no cough or audible wheeze  Psych: Alert and oriented x3, normal affect and mood.       Assessment and Plan:   The following treatment plan was discussed: "     Due to the chronicity and severity of the patient's migraines, I do think referral to specialty care is warranted.  A longer term treatment plan would benefit patient as her current medications are providing insufficient relief and causing intolerable side effects.    We did discuss of the use of a low-dose beta-blocker in blood pressure management as well as migraine prophylaxis.  We will provide low-dose propranolol prescription patient can use up to twice daily if needed.  Advised her to do this, continue to monitor her symptoms and blood pressure and let me know how she feels.    I do recommend follow-up in the clinic within the next 3 months, at that time her PCP should be back.  She is encouraged to reach out sooner if needed.    1. Bilateral temporomandibular joint pain  - REFERRAL TO PAIN CLINIC    2. Migraine without status migrainosus, not intractable, unspecified migraine type  - propranolol (INDERAL) 10 MG Tab; Take 1 Tab by mouth 2 times a day.  Dispense: 60 Tab; Refill: 2  - REFERRAL TO PAIN CLINIC    3. Essential hypertension  - CBC WITH DIFFERENTIAL; Future  - Lipid Profile; Future  - TSH WITH REFLEX TO FT4; Future  - VITAMIN D,25 HYDROXY; Future  - Comp Metabolic Panel; Future  - propranolol (INDERAL) 10 MG Tab; Take 1 Tab by mouth 2 times a day.  Dispense: 60 Tab; Refill: 2    4. Screening for cardiovascular condition  - CBC WITH DIFFERENTIAL; Future  - Lipid Profile; Future    5. Screening for thyroid disorder  - TSH WITH REFLEX TO FT4; Future    6. Screening for metabolic disorder  - Comp Metabolic Panel; Future    7. Encounter for vitamin deficiency screening  - CBC WITH DIFFERENTIAL; Future  - VITAMIN D,25 HYDROXY; Future  - Comp Metabolic Panel; Future        Follow-up: Return in about 3 months (around 8/8/2020).

## 2020-05-08 NOTE — Clinical Note
Rx for migraine prevention / referral to pain Fasting labs (can get done in next 1-2 months) F/u in 3 months (prob venkat Araiza by then)

## 2020-05-26 DIAGNOSIS — G43.909 MIGRAINE WITHOUT STATUS MIGRAINOSUS, NOT INTRACTABLE, UNSPECIFIED MIGRAINE TYPE: ICD-10-CM

## 2020-05-28 RX ORDER — RIZATRIPTAN BENZOATE 10 MG/1
TABLET ORAL
Qty: 9 TAB | Refills: 5 | Status: SHIPPED | OUTPATIENT
Start: 2020-05-28 | End: 2020-09-09

## 2020-05-28 NOTE — TELEPHONE ENCOUNTER
Pt last seen regarding this issue 5/20. Will send 6  fills to pharmacy. Pt recently started on low dose beta blocker for prophylaxis.

## 2020-05-31 DIAGNOSIS — I10 ESSENTIAL HYPERTENSION: ICD-10-CM

## 2020-05-31 DIAGNOSIS — G43.909 MIGRAINE WITHOUT STATUS MIGRAINOSUS, NOT INTRACTABLE, UNSPECIFIED MIGRAINE TYPE: ICD-10-CM

## 2020-06-03 RX ORDER — PROPRANOLOL HYDROCHLORIDE 10 MG/1
TABLET ORAL
Qty: 60 TAB | Refills: 2 | Status: SHIPPED | OUTPATIENT
Start: 2020-06-03 | End: 2020-09-18

## 2020-07-15 DIAGNOSIS — I10 ESSENTIAL HYPERTENSION: ICD-10-CM

## 2020-07-16 DIAGNOSIS — Z78.9 USES BIRTH CONTROL: ICD-10-CM

## 2020-07-16 RX ORDER — LOSARTAN POTASSIUM 25 MG/1
TABLET ORAL
Qty: 90 TAB | Refills: 1 | Status: SHIPPED | OUTPATIENT
Start: 2020-07-16 | End: 2020-09-18

## 2020-07-16 NOTE — TELEPHONE ENCOUNTER
Last seen by PCP 5/8/2020.   Last Blood Pressure reading was 133/85 on 5/8/2020    Lab Results   Component Value Date/Time    SODIUM 136 03/02/2018 10:11 AM    POTASSIUM 4.1 03/02/2018 10:11 AM    CHLORIDE 104 03/02/2018 10:11 AM    CO2 28 03/02/2018 10:11 AM    GLUCOSE 86 03/02/2018 10:11 AM    BUN 12 03/02/2018 10:11 AM    CREATININE 0.69 03/02/2018 10:11 AM     Please remind pt to get labs done    Will send 6 month(s) to the pharmacy.

## 2020-07-21 RX ORDER — NORETHINDRONE 0.35 MG/1
TABLET ORAL
Qty: 84 TAB | Refills: 1 | Status: SHIPPED | OUTPATIENT
Start: 2020-07-21 | End: 2020-11-16

## 2020-08-12 ENCOUNTER — OFFICE VISIT (OUTPATIENT)
Dept: MEDICAL GROUP | Facility: PHYSICIAN GROUP | Age: 30
End: 2020-08-12
Payer: COMMERCIAL

## 2020-08-12 VITALS
BODY MASS INDEX: 22.2 KG/M2 | HEIGHT: 68 IN | HEART RATE: 82 BPM | DIASTOLIC BLOOD PRESSURE: 80 MMHG | SYSTOLIC BLOOD PRESSURE: 128 MMHG

## 2020-08-12 DIAGNOSIS — R39.9 UTI SYMPTOMS: ICD-10-CM

## 2020-08-12 LAB
APPEARANCE UR: NORMAL
BILIRUB UR STRIP-MCNC: NEGATIVE MG/DL
COLOR UR AUTO: NORMAL
GLUCOSE UR STRIP.AUTO-MCNC: NEGATIVE MG/DL
KETONES UR STRIP.AUTO-MCNC: NEGATIVE MG/DL
LEUKOCYTE ESTERASE UR QL STRIP.AUTO: NORMAL
NITRITE UR QL STRIP.AUTO: POSITIVE
PH UR STRIP.AUTO: 6.5 [PH] (ref 5–8)
PROT UR QL STRIP: NEGATIVE MG/DL
RBC UR QL AUTO: NORMAL
SP GR UR STRIP.AUTO: 1.01
UROBILINOGEN UR STRIP-MCNC: 0.2 MG/DL

## 2020-08-12 PROCEDURE — 99214 OFFICE O/P EST MOD 30 MIN: CPT | Performed by: PHYSICIAN ASSISTANT

## 2020-08-12 PROCEDURE — 81002 URINALYSIS NONAUTO W/O SCOPE: CPT | Performed by: PHYSICIAN ASSISTANT

## 2020-08-12 RX ORDER — TOPIRAMATE 25 MG/1
TABLET ORAL
COMMUNITY
Start: 2020-05-19 | End: 2020-09-18

## 2020-08-12 RX ORDER — METHOCARBAMOL 500 MG/1
500 TABLET, FILM COATED ORAL 4 TIMES DAILY
COMMUNITY
Start: 2020-08-06 | End: 2020-09-18

## 2020-08-12 RX ORDER — NITROFURANTOIN 25; 75 MG/1; MG/1
100 CAPSULE ORAL 2 TIMES DAILY
Qty: 10 CAP | Refills: 0 | Status: SHIPPED | OUTPATIENT
Start: 2020-08-12 | End: 2020-08-17

## 2020-08-12 NOTE — PROGRESS NOTES
"Subjective:   Tiffany Francois is a 29 y.o. female here today for UTI symptoms. Is an established patient of mine.    HPI:    Patient presents to the office today with concerns for UTI. States has been experiencing urinary frequency, urgency, and dysuria for about 1.5 weeks now.  Has blood in her urine but states she is also on her period.  Denies fever, abdominal/flank pain, vomiting.  Has had UTIs in the past but not in several years.      Current medicines (including changes today)  Current Outpatient Medications   Medication Sig Dispense Refill   • methocarbamol (ROBAXIN) 500 MG Tab Take 500 mg by mouth 4 times a day. FOR 5 DAYS     • nitrofurantoin (MACROBID) 100 MG Cap Take 1 Cap by mouth 2 times a day for 5 days. 10 Cap 0   • HELEN 0.35 MG tablet TAKE 1 TABLET BY MOUTH EVERY DAY 84 Tab 1   • rizatriptan (MAXALT) 10 MG tablet TAKE 1 TABLET BY MOUTH ONCE AS NEEDED FOR MIGRAINE 9 Tab 5   • topiramate (TOPAMAX) 25 MG Tab PLEASE SEE ATTACHED FOR DETAILED DIRECTIONS     • losartan (COZAAR) 25 MG Tab TAKE 1 TABLET BY MOUTH EVERY DAY (Patient not taking: Reported on 8/12/2020) 90 Tab 1   • propranolol (INDERAL) 10 MG Tab TAKE 1 TABLET BY MOUTH TWICE A DAY 60 Tab 2     No current facility-administered medications for this visit.      She  has a past medical history of Hypertension and Migraine.    ROS  As per HPI.       Objective:     /80 (BP Location: Left arm, Patient Position: Sitting, BP Cuff Size: Adult)   Ht 1.727 m (5' 8\")  Body mass index is 22.2 kg/m².     Physical Exam:  Constitutional: Alert, well-appearing, no distress.  Skin: No rashes in visible areas.  Eye: Pupils are equal and round, conjunctiva clear, lids normal.  ENMT: Lips without lesions, moist mucus membranes.  Neck: Normal-appearing.  Respiratory: Unlabored respiratory effort, lungs clear to auscultation, no wheezes, no rhonchi.  Cardiovascular: Normal S1, S2, no murmur.  Abdomen: Normoactive bowel sounds.  Abdomen is soft, " nondistended.  Mild tenderness in suprapubic region.  No rebound or guarding.      Assessment and Plan:   The following treatment plan was discussed    1. UTI symptoms  New problem, uncontrolled.  History consistent with acute UTI.  UA done in the office shows trace blood, small leukocytes, and positive nitrite.  Will start patient on oral antibiotics.  I have prescribed Macrobid 100 mg twice daily for 5 days.  Counseled on importance of adequate hydration.  She will be notified of urine culture results when back and if change in antibiotic is needed, can be prescribed at that time.  - POCT Urinalysis      Followup: Return if symptoms worsen or fail to improve.    Teodora Morgan P.A.-C.

## 2020-08-12 NOTE — PATIENT INSTRUCTIONS
The 2 providers available to establish care with at this clinic are:  1. Dr. Lidia Murillo MD  2. JESUS Harris        Urinary Tract Infection, Adult    A urinary tract infection (UTI) is an infection of any part of the urinary tract. The urinary tract includes the kidneys, ureters, bladder, and urethra. These organs make, store, and get rid of urine in the body.  Your health care provider may use other names to describe the infection. An upper UTI affects the ureters and kidneys (pyelonephritis). A lower UTI affects the bladder (cystitis) and urethra (urethritis).  What are the causes?  Most urinary tract infections are caused by bacteria in your genital area, around the entrance to your urinary tract (urethra). These bacteria grow and cause inflammation of your urinary tract.  What increases the risk?  You are more likely to develop this condition if:  · You have a urinary catheter that stays in place (indwelling).  · You are not able to control when you urinate or have a bowel movement (you have incontinence).  · You are female and you:  ? Use a spermicide or diaphragm for birth control.  ? Have low estrogen levels.  ? Are pregnant.  · You have certain genes that increase your risk (genetics).  · You are sexually active.  · You take antibiotic medicines.  · You have a condition that causes your flow of urine to slow down, such as:  ? An enlarged prostate, if you are male.  ? Blockage in your urethra (stricture).  ? A kidney stone.  ? A nerve condition that affects your bladder control (neurogenic bladder).  ? Not getting enough to drink, or not urinating often.  · You have certain medical conditions, such as:  ? Diabetes.  ? A weak disease-fighting system (immunesystem).  ? Sickle cell disease.  ? Gout.  ? Spinal cord injury.  What are the signs or symptoms?  Symptoms of this condition include:  · Needing to urinate right away (urgently).  · Frequent urination or passing small amounts of urine  frequently.  · Pain or burning with urination.  · Blood in the urine.  · Urine that smells bad or unusual.  · Trouble urinating.  · Cloudy urine.  · Vaginal discharge, if you are female.  · Pain in the abdomen or the lower back.  You may also have:  · Vomiting or a decreased appetite.  · Confusion.  · Irritability or tiredness.  · A fever.  · Diarrhea.  The first symptom in older adults may be confusion. In some cases, they may not have any symptoms until the infection has worsened.  How is this diagnosed?  This condition is diagnosed based on your medical history and a physical exam. You may also have other tests, including:  · Urine tests.  · Blood tests.  · Tests for sexually transmitted infections (STIs).  If you have had more than one UTI, a cystoscopy or imaging studies may be done to determine the cause of the infections.  How is this treated?  Treatment for this condition includes:  · Antibiotic medicine.  · Over-the-counter medicines to treat discomfort.  · Drinking enough water to stay hydrated.  If you have frequent infections or have other conditions such as a kidney stone, you may need to see a health care provider who specializes in the urinary tract (urologist).  In rare cases, urinary tract infections can cause sepsis. Sepsis is a life-threatening condition that occurs when the body responds to an infection. Sepsis is treated in the hospital with IV antibiotics, fluids, and other medicines.  Follow these instructions at home:    Medicines  · Take over-the-counter and prescription medicines only as told by your health care provider.  · If you were prescribed an antibiotic medicine, take it as told by your health care provider. Do not stop using the antibiotic even if you start to feel better.  General instructions  · Make sure you:  ? Empty your bladder often and completely. Do not hold urine for long periods of time.  ? Empty your bladder after sex.  ? Wipe from front to back after a bowel movement  if you are female. Use each tissue one time when you wipe.  · Drink enough fluid to keep your urine pale yellow.  · Keep all follow-up visits as told by your health care provider. This is important.  Contact a health care provider if:  · Your symptoms do not get better after 1-2 days.  · Your symptoms go away and then return.  Get help right away if you have:  · Severe pain in your back or your lower abdomen.  · A fever.  · Nausea or vomiting.  Summary  · A urinary tract infection (UTI) is an infection of any part of the urinary tract, which includes the kidneys, ureters, bladder, and urethra.  · Most urinary tract infections are caused by bacteria in your genital area, around the entrance to your urinary tract (urethra).  · Treatment for this condition often includes antibiotic medicines.  · If you were prescribed an antibiotic medicine, take it as told by your health care provider. Do not stop using the antibiotic even if you start to feel better.  · Keep all follow-up visits as told by your health care provider. This is important.  This information is not intended to replace advice given to you by your health care provider. Make sure you discuss any questions you have with your health care provider.  Document Released: 09/27/2006 Document Revised: 12/05/2019 Document Reviewed: 06/27/2019  [employer] Patient Education © 2020 [employer]

## 2020-08-20 DIAGNOSIS — R39.9 UTI SYMPTOMS: ICD-10-CM

## 2020-08-20 RX ORDER — SULFAMETHOXAZOLE AND TRIMETHOPRIM 800; 160 MG/1; MG/1
1 TABLET ORAL 2 TIMES DAILY
Qty: 10 TAB | Refills: 0 | Status: SHIPPED | OUTPATIENT
Start: 2020-08-20 | End: 2020-08-25

## 2020-09-09 ENCOUNTER — HOSPITAL ENCOUNTER (OUTPATIENT)
Dept: LAB | Facility: MEDICAL CENTER | Age: 30
End: 2020-09-09
Attending: PHYSICIAN ASSISTANT
Payer: COMMERCIAL

## 2020-09-09 DIAGNOSIS — R39.9 UTI SYMPTOMS: ICD-10-CM

## 2020-09-09 PROCEDURE — 87086 URINE CULTURE/COLONY COUNT: CPT

## 2020-09-09 PROCEDURE — 87077 CULTURE AEROBIC IDENTIFY: CPT

## 2020-09-09 PROCEDURE — 87186 SC STD MICRODIL/AGAR DIL: CPT

## 2020-09-10 ENCOUNTER — APPOINTMENT (OUTPATIENT)
Dept: RADIOLOGY | Facility: MEDICAL CENTER | Age: 30
End: 2020-09-10
Attending: EMERGENCY MEDICINE
Payer: COMMERCIAL

## 2020-09-10 ENCOUNTER — HOSPITAL ENCOUNTER (EMERGENCY)
Facility: MEDICAL CENTER | Age: 30
End: 2020-09-10
Attending: EMERGENCY MEDICINE
Payer: COMMERCIAL

## 2020-09-10 VITALS
RESPIRATION RATE: 16 BRPM | DIASTOLIC BLOOD PRESSURE: 72 MMHG | HEART RATE: 86 BPM | BODY MASS INDEX: 21.68 KG/M2 | OXYGEN SATURATION: 98 % | SYSTOLIC BLOOD PRESSURE: 106 MMHG | WEIGHT: 143.08 LBS | HEIGHT: 68 IN | TEMPERATURE: 98.6 F

## 2020-09-10 DIAGNOSIS — N12 PYELONEPHRITIS: ICD-10-CM

## 2020-09-10 DIAGNOSIS — R73.9 HYPERGLYCEMIA: ICD-10-CM

## 2020-09-10 LAB
ALBUMIN SERPL BCP-MCNC: 4.1 G/DL (ref 3.2–4.9)
ALBUMIN/GLOB SERPL: 1.3 G/DL
ALP SERPL-CCNC: 88 U/L (ref 30–99)
ALT SERPL-CCNC: 11 U/L (ref 2–50)
ANION GAP SERPL CALC-SCNC: 16 MMOL/L (ref 7–16)
APPEARANCE UR: ABNORMAL
AST SERPL-CCNC: 12 U/L (ref 12–45)
BACTERIA #/AREA URNS HPF: ABNORMAL /HPF
BASOPHILS # BLD AUTO: 0 % (ref 0–1.8)
BASOPHILS # BLD: 0 K/UL (ref 0–0.12)
BILIRUB SERPL-MCNC: 0.6 MG/DL (ref 0.1–1.5)
BILIRUB UR QL STRIP.AUTO: NEGATIVE
BUN SERPL-MCNC: 10 MG/DL (ref 8–22)
CALCIUM SERPL-MCNC: 9.2 MG/DL (ref 8.5–10.5)
CHLORIDE SERPL-SCNC: 98 MMOL/L (ref 96–112)
CO2 SERPL-SCNC: 22 MMOL/L (ref 20–33)
COLOR UR: ABNORMAL
CREAT SERPL-MCNC: 0.73 MG/DL (ref 0.5–1.4)
EOSINOPHIL # BLD AUTO: 0 K/UL (ref 0–0.51)
EOSINOPHIL NFR BLD: 0 % (ref 0–6.9)
EPI CELLS #/AREA URNS HPF: ABNORMAL /HPF
ERYTHROCYTE [DISTWIDTH] IN BLOOD BY AUTOMATED COUNT: 40.7 FL (ref 35.9–50)
GLOBULIN SER CALC-MCNC: 3.1 G/DL (ref 1.9–3.5)
GLUCOSE SERPL-MCNC: 165 MG/DL (ref 65–99)
GLUCOSE UR STRIP.AUTO-MCNC: NEGATIVE MG/DL
HCG SERPL QL: NEGATIVE
HCT VFR BLD AUTO: 38.5 % (ref 37–47)
HGB BLD-MCNC: 12.7 G/DL (ref 12–16)
HYALINE CASTS #/AREA URNS LPF: ABNORMAL /LPF
KETONES UR STRIP.AUTO-MCNC: 15 MG/DL
LACTATE BLD-SCNC: 1.7 MMOL/L (ref 0.5–2)
LEUKOCYTE ESTERASE UR QL STRIP.AUTO: ABNORMAL
LYMPHOCYTES # BLD AUTO: 0.79 K/UL (ref 1–4.8)
LYMPHOCYTES NFR BLD: 4.3 % (ref 22–41)
MANUAL DIFF BLD: NORMAL
MCH RBC QN AUTO: 30.8 PG (ref 27–33)
MCHC RBC AUTO-ENTMCNC: 33 G/DL (ref 33.6–35)
MCV RBC AUTO: 93.4 FL (ref 81.4–97.8)
METAMYELOCYTES NFR BLD MANUAL: 0.9 %
MICRO URNS: ABNORMAL
MONOCYTES # BLD AUTO: 0.64 K/UL (ref 0–0.85)
MONOCYTES NFR BLD AUTO: 3.5 % (ref 0–13.4)
MORPHOLOGY BLD-IMP: NORMAL
NEUTROPHILS # BLD AUTO: 16.71 K/UL (ref 2–7.15)
NEUTROPHILS NFR BLD: 83.5 % (ref 44–72)
NEUTS BAND NFR BLD MANUAL: 7.8 % (ref 0–10)
NITRITE UR QL STRIP.AUTO: POSITIVE
NRBC # BLD AUTO: 0 K/UL
NRBC BLD-RTO: 0 /100 WBC
PH UR STRIP.AUTO: 5.5 [PH] (ref 5–8)
PLATELET # BLD AUTO: 193 K/UL (ref 164–446)
PLATELET BLD QL SMEAR: NORMAL
PMV BLD AUTO: 11.1 FL (ref 9–12.9)
POTASSIUM SERPL-SCNC: 4.2 MMOL/L (ref 3.6–5.5)
PROT SERPL-MCNC: 7.2 G/DL (ref 6–8.2)
PROT UR QL STRIP: 100 MG/DL
RBC # BLD AUTO: 4.12 M/UL (ref 4.2–5.4)
RBC # URNS HPF: ABNORMAL /HPF
RBC BLD AUTO: NORMAL
RBC UR QL AUTO: ABNORMAL
SODIUM SERPL-SCNC: 136 MMOL/L (ref 135–145)
SP GR UR STRIP.AUTO: 1.03
UROBILINOGEN UR STRIP.AUTO-MCNC: 1 MG/DL
WBC # BLD AUTO: 18.3 K/UL (ref 4.8–10.8)
WBC #/AREA URNS HPF: ABNORMAL /HPF

## 2020-09-10 PROCEDURE — 87077 CULTURE AEROBIC IDENTIFY: CPT

## 2020-09-10 PROCEDURE — 74176 CT ABD & PELVIS W/O CONTRAST: CPT

## 2020-09-10 PROCEDURE — 99284 EMERGENCY DEPT VISIT MOD MDM: CPT

## 2020-09-10 PROCEDURE — 85027 COMPLETE CBC AUTOMATED: CPT

## 2020-09-10 PROCEDURE — 81001 URINALYSIS AUTO W/SCOPE: CPT

## 2020-09-10 PROCEDURE — 85007 BL SMEAR W/DIFF WBC COUNT: CPT

## 2020-09-10 PROCEDURE — 700105 HCHG RX REV CODE 258: Performed by: EMERGENCY MEDICINE

## 2020-09-10 PROCEDURE — 71045 X-RAY EXAM CHEST 1 VIEW: CPT

## 2020-09-10 PROCEDURE — 83605 ASSAY OF LACTIC ACID: CPT

## 2020-09-10 PROCEDURE — 87086 URINE CULTURE/COLONY COUNT: CPT

## 2020-09-10 PROCEDURE — 96375 TX/PRO/DX INJ NEW DRUG ADDON: CPT

## 2020-09-10 PROCEDURE — 96365 THER/PROPH/DIAG IV INF INIT: CPT

## 2020-09-10 PROCEDURE — 80053 COMPREHEN METABOLIC PANEL: CPT

## 2020-09-10 PROCEDURE — 36415 COLL VENOUS BLD VENIPUNCTURE: CPT

## 2020-09-10 PROCEDURE — 87186 SC STD MICRODIL/AGAR DIL: CPT

## 2020-09-10 PROCEDURE — 700111 HCHG RX REV CODE 636 W/ 250 OVERRIDE (IP): Performed by: EMERGENCY MEDICINE

## 2020-09-10 PROCEDURE — 84703 CHORIONIC GONADOTROPIN ASSAY: CPT

## 2020-09-10 PROCEDURE — 87040 BLOOD CULTURE FOR BACTERIA: CPT | Mod: 91

## 2020-09-10 RX ORDER — HYDROCODONE BITARTRATE AND ACETAMINOPHEN 5; 325 MG/1; MG/1
1 TABLET ORAL EVERY 6 HOURS PRN
Qty: 12 TAB | Refills: 0 | Status: SHIPPED | OUTPATIENT
Start: 2020-09-10 | End: 2020-09-10 | Stop reason: SDUPTHER

## 2020-09-10 RX ORDER — CIPROFLOXACIN 500 MG/1
500 TABLET, FILM COATED ORAL 2 TIMES DAILY
Qty: 14 TAB | Refills: 0 | Status: SHIPPED | OUTPATIENT
Start: 2020-09-10 | End: 2020-09-17

## 2020-09-10 RX ORDER — CIPROFLOXACIN 500 MG/1
500 TABLET, FILM COATED ORAL 2 TIMES DAILY
Qty: 14 TAB | Refills: 0 | Status: SHIPPED | OUTPATIENT
Start: 2020-09-10 | End: 2020-09-10 | Stop reason: SDUPTHER

## 2020-09-10 RX ORDER — SODIUM CHLORIDE 9 MG/ML
1000 INJECTION, SOLUTION INTRAVENOUS ONCE
Status: COMPLETED | OUTPATIENT
Start: 2020-09-10 | End: 2020-09-10

## 2020-09-10 RX ORDER — HYDROCODONE BITARTRATE AND ACETAMINOPHEN 5; 325 MG/1; MG/1
1 TABLET ORAL EVERY 6 HOURS PRN
Qty: 12 TAB | Refills: 0 | Status: SHIPPED | OUTPATIENT
Start: 2020-09-10 | End: 2020-09-13

## 2020-09-10 RX ORDER — KETOROLAC TROMETHAMINE 30 MG/ML
30 INJECTION, SOLUTION INTRAMUSCULAR; INTRAVENOUS ONCE
Status: COMPLETED | OUTPATIENT
Start: 2020-09-10 | End: 2020-09-10

## 2020-09-10 RX ORDER — ONDANSETRON 2 MG/ML
4 INJECTION INTRAMUSCULAR; INTRAVENOUS ONCE
Status: COMPLETED | OUTPATIENT
Start: 2020-09-10 | End: 2020-09-10

## 2020-09-10 RX ORDER — AMITRIPTYLINE HYDROCHLORIDE 10 MG/1
10 TABLET, FILM COATED ORAL NIGHTLY
Status: SHIPPED | COMMUNITY
End: 2022-10-03 | Stop reason: SINTOL

## 2020-09-10 RX ADMIN — SODIUM CHLORIDE 1000 ML: 9 INJECTION, SOLUTION INTRAVENOUS at 12:58

## 2020-09-10 RX ADMIN — SODIUM CHLORIDE 1000 ML: 9 INJECTION, SOLUTION INTRAVENOUS at 10:56

## 2020-09-10 RX ADMIN — ONDANSETRON 4 MG: 2 INJECTION INTRAMUSCULAR; INTRAVENOUS at 10:56

## 2020-09-10 RX ADMIN — KETOROLAC TROMETHAMINE 30 MG: 30 INJECTION, SOLUTION INTRAMUSCULAR at 11:52

## 2020-09-10 RX ADMIN — CEFTRIAXONE SODIUM 1 G: 1 INJECTION, POWDER, FOR SOLUTION INTRAMUSCULAR; INTRAVENOUS at 12:06

## 2020-09-10 ASSESSMENT — PAIN DESCRIPTION - PAIN TYPE: TYPE: ACUTE PAIN

## 2020-09-10 NOTE — ED NOTES
VSS.  Discharge instructions and prescriptions x2 given- verbalizes understanding.   Ambulatory to lobby with steady gait.

## 2020-09-10 NOTE — LETTER
9/13/2020               Tiffany Francois  [address] [address]        Dear Tiffany (MR#[id number])    This letter is sent in regards to your, recent visit to the [facility] Emergency Department on 9/10/2020.  During the visit, tests were performed to assist the physician in a medical diagnosis.  A review of those tests requires that we notify you of the following:    Your urine culture was POSITIVE for a bacteria called Escherichia coli. The antibiotic prescribed for you (ciprofloxacin) should be active to treat this bacteria. IT IS IMPORTANT THAT YOU CONTINUE TAKING YOUR ANTIBIOTIC UNTIL IT IS FINISHED.      Please feel free to contact me at the number below if you have any questions or concerns. Thank you for your cooperation in the matter.    Sincerely,  ED Culture Follow-Up Staff  Henry Morse, PharmD,     [facility], Emergency Department  [address] [place] [id number]  [phone] (ED Culture Line)  [phone]

## 2020-09-10 NOTE — ED PROVIDER NOTES
ED Provider Note    CHIEF COMPLAINT  Chief Complaint   Patient presents with   • Flank Pain     pt dx with UTI and was on macrobid, didnt improve was put on bactrim and completed. pt now having left back and flank pain    • Painful Urination     dysuria, burning, and foul smelling per patient       HPI  Tiffany Francois is a 29 y.o. female who presents with left flank pain.  Symptoms have been getting worse over the last 2 days.  Patient was diagnosed with a UTI about a month ago and treated with Macrobid for 5 days.  She felt like she continued to have symptoms and therefore her primary care doctor put her on Bactrim DS for 5 days.  She feels like her symptoms have never completely gone away.  However she started feeling worse over the last 2 days with body aches chills and a decreased appetite.  She complains of nausea but no vomiting.  No diarrhea.  She does not know if she has had a temperature.  She does complain of pain with urination and foul-smelling urine.  She denies any vaginal discharge.  She is currently on her period.  Pain is on the left lower quadrant and radiates into the flank area.  She has a history of an appendectomy.  She denies any known cold exposure.  She denies any cough or chest pain or shortness of breath.  She denies being pregnant.  She is tried Tylenol and ibuprofen with minimal relief.  She also tried Robaxin with no relief.    REVIEW OF SYSTEMS  See HPI for further details.  Denies any dizziness or syncope, skin rash, vaginal discharge, pregnancy, cough, chest pain, midline back pain, numbness or weakness in her arms or legs.  All other systems are negative.     PAST MEDICAL HISTORY  Past Medical History:   Diagnosis Date   • Hypertension    • Migraine        FAMILY HISTORY  [unfilled]    SOCIAL HISTORY  Social History     Socioeconomic History   • Marital status: Single     Spouse name: Not on file   • Number of children: Not on file   • Years of education: Not on file   •  Highest education level: Not on file   Occupational History   • Not on file   Social Needs   • Financial resource strain: Not on file   • Food insecurity     Worry: Not on file     Inability: Not on file   • Transportation needs     Medical: Not on file     Non-medical: Not on file   Tobacco Use   • Smoking status: Never Smoker   • Smokeless tobacco: Never Used   Substance and Sexual Activity   • Alcohol use: Yes     Frequency: Monthly or less     Drinks per session: 1 or 2     Binge frequency: Never     Comment: occassional - twice monthly at most   • Drug use: No   • Sexual activity: Never     Birth control/protection: Pill   Lifestyle   • Physical activity     Days per week: Not on file     Minutes per session: Not on file   • Stress: Not on file   Relationships   • Social connections     Talks on phone: Not on file     Gets together: Not on file     Attends Sabianist service: Not on file     Active member of club or organization: Not on file     Attends meetings of clubs or organizations: Not on file     Relationship status: Not on file   • Intimate partner violence     Fear of current or ex partner: Not on file     Emotionally abused: Not on file     Physically abused: Not on file     Forced sexual activity: Not on file   Other Topics Concern   • Not on file   Social History Narrative   • Not on file       SURGICAL HISTORY  Past Surgical History:   Procedure Laterality Date   • APPENDECTOMY         CURRENT MEDICATIONS   Home Medications     Reviewed by Cheryl Caraballo R.N. (Registered Nurse) on 09/10/20 at 1016  Med List Status: Complete   Medication Last Dose Status   amitriptyline (ELAVIL) 10 MG Tab 9/9/2020 Active   HELEN 0.35 MG tablet 9/10/2020 Active   losartan (COZAAR) 25 MG Tab  Active   methocarbamol (ROBAXIN) 500 MG Tab  Active   propranolol (INDERAL) 10 MG Tab Not Taking Active   rizatriptan (MAXALT) 10 MG tablet  Active   topiramate (TOPAMAX) 25 MG Tab  Active                ALLERGIES  No Known  "Allergies    PHYSICAL EXAM  VITAL SIGNS: /71   Pulse (!) 126   Temp 37 °C (98.6 °F) (Temporal)   Resp 16   Ht 1.727 m (5' 8\")   Wt 64.9 kg (143 lb 1.3 oz)   SpO2 97%   BMI 21.76 kg/m²       Constitutional: Well developed, No acute distress, Non-toxic appearance.   HENT: Normocephalic, Atraumatic, Bilateral external ears normal, Oropharynx dry, No oral exudates, Nose normal.   Eyes: PERRL, EOMI, Conjunctiva normal, No discharge.   Neck: Normal range of motion, No tenderness, Supple, No stridor.  No meningeal signs  Cardiovascular: tachycardic, Normal rhythm   Thorax & Lungs: Normal breath sounds, No respiratory distress, No chest tenderness.   Abdomen: Left lower quadrant tenderness to palpation, no distention, no rebound or guarding, no pulsatile mass, no distention  Skin: Warm, Dry, No erythema, No rash.   Back: No tenderness, left CVA tenderness.   Extremities: Intact distal pulses, No edema, No tenderness   Neurologic: Alert & oriented x 3, Normal motor function, Normal sensory function, No focal deficits noted.   Psychiatric: appropriate        Labs  Results for orders placed or performed during the hospital encounter of 09/10/20   Lactic acid (lactate)   Result Value Ref Range    Lactic Acid 1.7 0.5 - 2.0 mmol/L   CBC WITH DIFFERENTIAL   Result Value Ref Range    WBC 18.3 (H) 4.8 - 10.8 K/uL    RBC 4.12 (L) 4.20 - 5.40 M/uL    Hemoglobin 12.7 12.0 - 16.0 g/dL    Hematocrit 38.5 37.0 - 47.0 %    MCV 93.4 81.4 - 97.8 fL    MCH 30.8 27.0 - 33.0 pg    MCHC 33.0 (L) 33.6 - 35.0 g/dL    RDW 40.7 35.9 - 50.0 fL    Platelet Count 193 164 - 446 K/uL    MPV 11.1 9.0 - 12.9 fL    Neutrophils-Polys 83.50 (H) 44.00 - 72.00 %    Lymphocytes 4.30 (L) 22.00 - 41.00 %    Monocytes 3.50 0.00 - 13.40 %    Eosinophils 0.00 0.00 - 6.90 %    Basophils 0.00 0.00 - 1.80 %    Nucleated RBC 0.00 /100 WBC    Neutrophils (Absolute) 16.71 (H) 2.00 - 7.15 K/uL    Lymphs (Absolute) 0.79 (L) 1.00 - 4.80 K/uL    Monos (Absolute) " 0.64 0.00 - 0.85 K/uL    Eos (Absolute) 0.00 0.00 - 0.51 K/uL    Baso (Absolute) 0.00 0.00 - 0.12 K/uL    NRBC (Absolute) 0.00 K/uL   COMP METABOLIC PANEL   Result Value Ref Range    Sodium 136 135 - 145 mmol/L    Potassium 4.2 3.6 - 5.5 mmol/L    Chloride 98 96 - 112 mmol/L    Co2 22 20 - 33 mmol/L    Anion Gap 16.0 7.0 - 16.0    Glucose 165 (H) 65 - 99 mg/dL    Bun 10 8 - 22 mg/dL    Creatinine 0.73 0.50 - 1.40 mg/dL    Calcium 9.2 8.5 - 10.5 mg/dL    AST(SGOT) 12 12 - 45 U/L    ALT(SGPT) 11 2 - 50 U/L    Alkaline Phosphatase 88 30 - 99 U/L    Total Bilirubin 0.6 0.1 - 1.5 mg/dL    Albumin 4.1 3.2 - 4.9 g/dL    Total Protein 7.2 6.0 - 8.2 g/dL    Globulin 3.1 1.9 - 3.5 g/dL    A-G Ratio 1.3 g/dL   URINALYSIS    Specimen: Blood   Result Value Ref Range    Color DK Yellow     Character Turbid (A)     Specific Gravity 1.030 <1.035    Ph 5.5 5.0 - 8.0    Glucose Negative Negative mg/dL    Ketones 15 (A) Negative mg/dL    Protein 100 (A) Negative mg/dL    Bilirubin Negative Negative    Urobilinogen, Urine 1.0 Negative    Nitrite Positive (A) Negative    Leukocyte Esterase Moderate (A) Negative    Occult Blood Small (A) Negative    Micro Urine Req Microscopic    HCG QUAL SERUM   Result Value Ref Range    Beta-Hcg Qualitative Serum Negative Negative   URINE MICROSCOPIC (W/UA)   Result Value Ref Range    WBC Packed (A) /hpf    RBC 10-20 (A) /hpf    Bacteria Many (A) None /hpf    Epithelial Cells Few /hpf    Hyaline Cast 3-5 (A) /lpf   ESTIMATED GFR   Result Value Ref Range    GFR If African American >60 >60 mL/min/1.73 m 2    GFR If Non African American >60 >60 mL/min/1.73 m 2   DIFFERENTIAL MANUAL   Result Value Ref Range    Bands-Stabs 7.80 0.00 - 10.00 %    Metamyelocytes 0.90 %    Manual Diff Status PERFORMED    PERIPHERAL SMEAR REVIEW   Result Value Ref Range    Peripheral Smear Review see below    PLATELET ESTIMATE   Result Value Ref Range    Plt Estimation Normal    MORPHOLOGY   Result Value Ref Range    RBC  Morphology Normal        RADIOLOGY/PROCEDURES  CT-RENAL COLIC EVALUATION(A/P W/O)   Final Result      No evidence of renal, ureteral or bladder calculi. No hydronephrosis.      No acute intra-abdominal abnormality is seen.      Hepatic cyst.      Postsurgical changes in the right lower quadrant.      4.7 mm groundglass nodule in the lingula is partially imaged and most likely postinflammatory given patient age.      DX-CHEST-PORTABLE (1 VIEW)   Final Result         1. No acute cardiopulmonary abnormalities are identified.          COURSE & MEDICAL DECISION MAKING  Pertinent Labs & Imaging studies reviewed. (See chart for details)  Patient presents emergency department with left flank and left lower quadrant abdominal pain.  She was complaining of dysuria and foul-smelling urine.  Patient is tachycardic here and therefore sepsis protocol was activated by triage.  Patient has no meningeal signs.  She does not have an acute abdomen.  She denies any vaginal discharge.  Plan is to check pregnancy test to rule out ectopic, check urinalysis to rule out pyelonephritis and obtain a CT scan to rule out kidney stone.  Her symptoms do not suggest diverticulitis that she not had any diarrhea.  She not have any cough or shortness of breath.  No known exposure to COVID.    Laboratory studies have returned and show a elevated white count of 18 but no evidence of a bandemia.  Patient has an elevated glucose of 165 and advised her to follow-up with her regular doctor to get that rechecked.  Rest of her electrolytes were without significant abnormalities.  Patient is not pregnant.  Lactic acid was 1.7.  Urine shows evidence of infection.  Urine culture has been sent.  Patient has been given a dose of Rocephin.  Patient's heart rate has improved with IV fluids.  Blood pressures remained stable.  She is mildly tachycardic still and therefore another liter fluid will be given.  Pain has improved.  CT scan shows no evidence of stone to  suggest an infected stone.  Therefore I believe the patient has pyelonephritis.  She is tolerating p.o. and therefore I believe stable for outpatient management.  She will be discharged with Cipro.  Strict return precautions were given.    In prescribing controlled substances to this patient, I certify that I have obtained and reviewed the medical history of Tiffany Francois. I have also made a good alvin effort to obtain applicable records from other providers who have treated the patient and records did not demonstrate any increased risk of substance abuse that would prevent me from prescribing controlled substances.     I have conducted a physical exam and documented it. I have reviewed Ms. Francois’s prescription history as maintained by the [place] Prescription Monitoring Program.     I have assessed the patient’s risk for abuse, dependency, and addiction using the validated Opioid Risk Tool available at https://www.mdcalc.com/dwbnvw-rdeu-ilqq-ort-narcotic-abuse.     Given the above, I believe the benefits of controlled substance therapy outweigh the risks. The reasons for prescribing controlled substances include non-narcotic, oral analgesic alternatives have been inadequate for pain control. Accordingly, I have discussed the risk and benefits, treatment plan, and alternative therapies with the patient.     Patient was advised to follow-up with her primary care doctor concerning there elevated blood sugar.    FINAL IMPRESSION     1. Pyelonephritis    2. Hyperglycemia             Electronically signed by: Caroline Landin M.D., 9/10/2020 10:37 AM

## 2020-09-10 NOTE — ED TRIAGE NOTES
"Tiffany Paniagua Francois   29 y.o. female   Chief Complaint   Patient presents with   • Flank Pain     pt dx with UTI and was on macrobid, didnt improve was put on bactrim and completed. pt now having left back and flank pain    • Painful Urination     dysuria, burning, and foul smelling per patient      Pt amb to triage with steady gait for above complain  Pt is alert and oriented, speaking in full sentences, follows commands and responds appropriately to questions. Pt appears to be in some discomfort during triage.  Resp are even and unlabored.   Protocol ordered and charge RN made aware, sepsis score 3.     Pt placed in lobby. Pt educated on triage process. Pt encouraged to alert staff for any changes.    /71   Pulse (!) 126   Temp 37 °C (98.6 °F) (Temporal)   Resp 16   Ht 1.727 m (5' 8\")   Wt 64.9 kg (143 lb 1.3 oz)   SpO2 97%   BMI 21.76 kg/m²     "

## 2020-09-10 NOTE — DISCHARGE INSTRUCTIONS
Go home and rest and drink plenty of fluids.  Take the antibiotic as directed.  Any fevers, unable to take your antibiotics, worsening pain return immediately for a recheck.  Follow-up with your regular doctor to ensure the infection clears.  Also follow-up with your doctor to get your blood sugar rechecked.

## 2020-09-12 LAB
BACTERIA UR CULT: ABNORMAL
SIGNIFICANT IND 70042: ABNORMAL
SIGNIFICANT IND 70042: ABNORMAL
SITE SITE: ABNORMAL
SITE SITE: ABNORMAL
SOURCE SOURCE: ABNORMAL
SOURCE SOURCE: ABNORMAL

## 2020-09-13 NOTE — ED NOTES
ED Positive Culture Follow-up/Notification Note:    Date: 09/13/20     Patient seen in the ED on 9/10/2020 for with left flank pain.  Symptoms have been getting worse over the last 2 days.  Patient was diagnosed with a UTI about a month ago and treated with Macrobid for 5 days.  She felt like she continued to have symptoms and therefore her primary care doctor put her on Bactrim DS for 5 days.  She feels like her symptoms have never completely gone away.  However she started feeling worse over the last 2 days with body aches chills and a decreased appetite.  She complains of nausea but no vomiting.  No diarrhea.  She does not know if she has had a temperature.  She does complain of pain with urination and foul-smelling urine.  She denies any vaginal discharge.  She is currently on her period.  Pain is on the left lower quadrant and radiates into the flank area.  She has a history of an appendectomy.   She denies any cough or chest pain or shortness of breath.  She denies being pregnant.  Tachycardiac on arrival sepsis protocol activated. Not pregnant. WBC 18.3, afebrile. CT no evidence of stones. Tachycardia improved w/ hydration.   1. Pyelonephritis    2. Hyperglycemia       Discharge Medication List as of 9/10/2020  2:30 PM      START taking these medications    Details   ciprofloxacin (CIPRO) 500 MG Tab Take 1 Tab by mouth 2 times a day for 7 days., Disp-14 Tab,R-0, Normal      HYDROcodone-acetaminophen (NORCO) 5-325 MG Tab per tablet Take 1 Tab by mouth every 6 hours as needed for up to 3 days., Disp-12 Tab,R-0, Print Rx Paper           CTX 1gm IV x1 given in ED   Allergies: Patient has no known allergies.     Vitals:    09/10/20 1034 09/10/20 1101 09/10/20 1211 09/10/20 1401   BP: 109/69 100/66 112/76 106/72   Pulse: (!) 117 (!) 111 (!) 106 86   Resp:       Temp:       TempSrc:       SpO2: 95% 95% 96% 98%   Weight:       Height:           Final cultures:   Results     Procedure Component Value Units Date/Time    "   URINE CULTURE(NEW) [[id number]]  (Abnormal)  (Susceptibility) Collected: 09/10/20 1004    Order Status: Completed Specimen: Urine Updated: 09/12/20 1012     Significant Indicator POS     Source UR     Site -     Culture Result -      Escherichia coli  >100,000 cfu/mL      Narrative:      Indication for culture:->Septic Shock: Persistent  hypotension,  Lactic acid > 4, vasopressors/inotropes started  Indication for culture:->Septic Shock: Persistent    Susceptibility     Escherichia coli (1)     Antibiotic Interpretation Microscan Method Status    Ampicillin Resistant >16 mcg/mL NAVID Final    Ceftriaxone Sensitive <=1 mcg/mL NAVID Final    Ceftazidime Sensitive <=1 mcg/mL NAVID Final    Cefotaxime Sensitive <=2 mcg/mL NAVID Final    Cefazolin Sensitive <=2 mcg/mL NAVID Final    Ciprofloxacin Sensitive <=1 mcg/mL NAVID Final    Ampicillin/sulbactam Intermediate 16/8 mcg/mL NAVID Final    Cefepime Sensitive <=2 mcg/mL NAVID Final    Tobramycin Sensitive <=4 mcg/mL NAVID Final    Cefotetan Sensitive <=16 mcg/mL NAVID Final    Nitrofurantoin Sensitive <=32 mcg/mL NAVID Final    Gentamicin Sensitive <=4 mcg/mL NAVID Final    Levofloxacin Sensitive <=2 mcg/mL NAVID Final    Pip/Tazobactam Sensitive <=16 mcg/mL NAVID Final    Trimeth/Sulfa Sensitive <=2/38 mcg/mL NAVID Final                   BLOOD CULTURE [[id number]] Collected: 09/10/20 1100    Order Status: Completed Specimen: Blood from Peripheral Updated: 09/11/20 0714     Significant Indicator NEG     Source BLD     Site PERIPHERAL     Culture Result No Growth  Note: Blood cultures are incubated for 5 days and  are monitored continuously.Positive blood cultures  are called to the RN and reported as soon as  they are identified.      Narrative:      Per Hospital Policy: Only change Specimen Src: to \"Line\" if  specified by physician order.  No site indicated    BLOOD CULTURE [[id number]] Collected: 09/10/20 1050    Order Status: Completed Specimen: Blood from Peripheral Updated: 09/11/20 0714     " "Significant Indicator NEG     Source BLD     Site PERIPHERAL     Culture Result No Growth  Note: Blood cultures are incubated for 5 days and  are monitored continuously.Positive blood cultures  are called to the RN and reported as soon as  they are identified.      Narrative:      Per Hospital Policy: Only change Specimen Src: to \"Line\" if  specified by physician order.  No site indicated    URINALYSIS [[id number]]  (Abnormal) Collected: 09/10/20 1004    Order Status: Completed Specimen: Blood Updated: 09/10/20 1043     Color DK Yellow     Character Turbid     Specific Gravity 1.030     Ph 5.5     Glucose Negative mg/dL      Ketones 15 mg/dL      Protein 100 mg/dL      Bilirubin Negative     Urobilinogen, Urine 1.0     Nitrite Positive     Leukocyte Esterase Moderate     Occult Blood Small     Micro Urine Req Microscopic    Narrative:      Indication for culture:->Septic Shock: Persistent  hypotension,  Lactic acid > 4, vasopressors/inotropes started          Plan:   Appropriate antibiotic therapy prescribed. No changes required based upon culture result.  Sent letter to patient to notify of positive culture result and encourage compliance with prescribed antibiotics.     Henry Mosre, PharmD    "

## 2020-09-15 LAB
BACTERIA BLD CULT: NORMAL
BACTERIA BLD CULT: NORMAL
SIGNIFICANT IND 70042: NORMAL
SIGNIFICANT IND 70042: NORMAL
SITE SITE: NORMAL
SITE SITE: NORMAL
SOURCE SOURCE: NORMAL
SOURCE SOURCE: NORMAL

## 2020-09-18 ENCOUNTER — TELEMEDICINE (OUTPATIENT)
Dept: MEDICAL GROUP | Facility: PHYSICIAN GROUP | Age: 30
End: 2020-09-18
Payer: COMMERCIAL

## 2020-09-18 DIAGNOSIS — Z13.21 ENCOUNTER FOR VITAMIN DEFICIENCY SCREENING: ICD-10-CM

## 2020-09-18 DIAGNOSIS — Z13.6 SCREENING FOR CARDIOVASCULAR CONDITION: ICD-10-CM

## 2020-09-18 DIAGNOSIS — Z00.00 ROUTINE ADULT HEALTH MAINTENANCE: ICD-10-CM

## 2020-09-18 DIAGNOSIS — Z11.3 SCREEN FOR SEXUALLY TRANSMITTED DISEASES: ICD-10-CM

## 2020-09-18 DIAGNOSIS — R39.9 UTI SYMPTOMS: ICD-10-CM

## 2020-09-18 DIAGNOSIS — Z13.228 SCREENING FOR METABOLIC DISORDER: ICD-10-CM

## 2020-09-18 PROCEDURE — 99213 OFFICE O/P EST LOW 20 MIN: CPT | Mod: 95,CR | Performed by: NURSE PRACTITIONER

## 2020-09-18 RX ORDER — UBROGEPANT 50 MG/1
50 TABLET ORAL PRN
COMMUNITY
Start: 2020-08-21

## 2020-09-18 RX ORDER — LIDOCAINE 50 MG/G
PATCH TOPICAL
COMMUNITY
Start: 2020-08-31 | End: 2021-07-09

## 2020-09-18 ASSESSMENT — ENCOUNTER SYMPTOMS
MYALGIAS: 0
HEADACHES: 1
FEVER: 0
FLANK PAIN: 0
WEAKNESS: 0
SORE THROAT: 0
COUGH: 0
DIZZINESS: 0
CHILLS: 0

## 2020-09-18 NOTE — PROGRESS NOTES
Telemedicine: Established Patient   This evaluation was conducted via Zoom using secure and encrypted videoconferencing technology. The patient was in a private location in the state [facility].    The patient's identity was confirmed and verbal consent was obtained for this virtual visit.    Subjective:   CC:   Chief Complaint   Patient presents with   • Hospital Follow-up       Tiffany Francois is a 29 y.o. female presenting for evaluation and management of:    Hospital follow-up.  Patient was experiencing treatment resistant urinary tract infection which worsened despite empiric antibiotics.  Went to the emergency department after she was experiencing persistent left flank pain; dark urine; malodorous urine; hot flashes / cold chills.  Was found to have a kidney infection, notable leukocytosis, kidney function within normal limits.  Incidental findings of the renal colic CT right hepatic cyst and a 4.7 mm groundglass nodule attributed to inflammatory changes.     Patient was sent home on 7-day course of ciprofloxacin 500 mg twice daily.  She is tolerated this well and feeling much better overall.  Not experiencing any neuropathy, tendon or joint pain.  She was seen to have elevated blood sugars while in the hospital and was recommended to try and reduce added sugar.  She has been cutting soda and energy drinks overall.      Review of Systems   Constitutional: Positive for malaise/fatigue. Negative for chills and fever.   HENT: Negative for sore throat.    Respiratory: Negative for cough.    Genitourinary: Negative for dysuria, flank pain, frequency, hematuria and urgency.   Musculoskeletal: Negative for myalgias.   Neurological: Positive for headaches. Negative for dizziness and weakness.         No Known Allergies    Current medicines (including changes today)  Current Outpatient Medications   Medication Sig Dispense Refill   • amitriptyline (ELAVIL) 10 MG Tab Take 10 mg by mouth every evening.     • HELEN  0.35 MG tablet TAKE 1 TABLET BY MOUTH EVERY DAY 84 Tab 1   • UBRELVY 50 MG Tab TAKE 1 TAB BY MOUTH ONSET OF MIRGRAINE. 1 TAB IF NO RELIEF AFTER 2 HRS MAX 2TAB/24HRS *NEEDS PA     • lidocaine (LIDODERM) 5 % Patch APPLY 1 PATCH TOPICALLY TO AFFECTED AREA LEAVE ON FOR 12 HOURS REMOVE FOR 12 HOURS       No current facility-administered medications for this visit.        Patient Active Problem List    Diagnosis Date Noted   • Uses birth control 10/02/2017   • Migraine headache 10/02/2017   • Essential hypertension 10/02/2017       Family History   Problem Relation Age of Onset   • Hypertension Maternal Aunt    • Diabetes Maternal Uncle    • Hypertension Maternal Uncle    • Diabetes Paternal Grandfather    • Heart Disease Paternal Grandfather    • No Known Problems Mother    • No Known Problems Father    • Cancer Paternal Grandmother         breast       She  has a past medical history of Hypertension and Migraine.  She  has a past surgical history that includes appendectomy.       Objective:   There were no vitals taken for this visit.    Physical Exam   Constitutional: She is oriented to person, place, and time and well-developed, well-nourished, and in no distress.   HENT:   Head: Normocephalic and atraumatic.   Right Ear: External ear normal.   Left Ear: External ear normal.   Nose: Nose normal.   Eyes: Pupils are equal, round, and reactive to light. Conjunctivae and EOM are normal.   Pulmonary/Chest: Effort normal.   Neurological: She is alert and oriented to person, place, and time.   Skin: Skin is dry.   Psychiatric: Mood, memory, affect and judgment normal.       Assessment and Plan:   The following treatment plan was discussed:     1. UTI symptoms: Urinalysis ordered, patient to complete test for infection resolution.  - URINALYSIS,CULTURE IF INDICATED; Future    2. Routine adult health maintenance: Routine fasting labs indicated as well as screening for sexually transmitted infection.  Orders provided, patient  informed to get these done at any [facility] fasting.  - CBC WITH DIFFERENTIAL; Future  - Comp Metabolic Panel; Future  - VITAMIN D,25 HYDROXY; Future  - Lipid Profile; Future  - HSV I/II IGG & IGM SERUM; Future  - HEP C VIRUS ANTIBODY; Future  - HIV AG/AB COMBO ASSAY SCREENING; Future  - T.PALLIDUM AB EIA; Future    3. Screening for cardiovascular condition  - CBC WITH DIFFERENTIAL; Future  - Lipid Profile; Future    4. Encounter for vitamin deficiency screening  - CBC WITH DIFFERENTIAL; Future  - Comp Metabolic Panel; Future  - VITAMIN D,25 HYDROXY; Future    5. Screening for metabolic disorder  - Comp Metabolic Panel; Future  - VITAMIN D,25 HYDROXY; Future  - Lipid Profile; Future    6. Screen for sexually transmitted diseases  - CBC WITH DIFFERENTIAL; Future  - HSV I/II IGG & IGM SERUM; Future  - HEP C VIRUS ANTIBODY; Future  - HIV AG/AB COMBO ASSAY SCREENING; Future  - T.PALLIDUM AB EIA; Future  - Chlamydia/GC PCR Urine Or Swab; Future    Other orders  - UBRELVY 50 MG Tab; TAKE 1 TAB BY MOUTH ONSET OF MIRGRAINE. 1 TAB IF NO RELIEF AFTER 2 HRS MAX 2TAB/24HRS *NEEDS PA  - lidocaine (LIDODERM) 5 % Patch; APPLY 1 PATCH TOPICALLY TO AFFECTED AREA LEAVE ON FOR 12 HOURS REMOVE FOR 12 HOURS        Follow-up: Return if symptoms worsen or fail to improve.

## 2020-10-13 ENCOUNTER — HOSPITAL ENCOUNTER (OUTPATIENT)
Dept: LAB | Facility: MEDICAL CENTER | Age: 30
End: 2020-10-13
Attending: NURSE PRACTITIONER
Payer: COMMERCIAL

## 2020-10-13 DIAGNOSIS — Z00.00 ROUTINE ADULT HEALTH MAINTENANCE: ICD-10-CM

## 2020-10-13 DIAGNOSIS — Z13.228 SCREENING FOR METABOLIC DISORDER: ICD-10-CM

## 2020-10-13 DIAGNOSIS — Z13.6 SCREENING FOR CARDIOVASCULAR CONDITION: ICD-10-CM

## 2020-10-13 DIAGNOSIS — Z13.21 ENCOUNTER FOR VITAMIN DEFICIENCY SCREENING: ICD-10-CM

## 2020-10-13 DIAGNOSIS — Z11.3 SCREEN FOR SEXUALLY TRANSMITTED DISEASES: ICD-10-CM

## 2020-10-13 DIAGNOSIS — R39.9 UTI SYMPTOMS: ICD-10-CM

## 2020-10-13 LAB
25(OH)D3 SERPL-MCNC: 44 NG/ML (ref 30–100)
ALBUMIN SERPL BCP-MCNC: 4.8 G/DL (ref 3.2–4.9)
ALBUMIN/GLOB SERPL: 1.5 G/DL
ALP SERPL-CCNC: 104 U/L (ref 30–99)
ALT SERPL-CCNC: 14 U/L (ref 2–50)
ANION GAP SERPL CALC-SCNC: 16 MMOL/L (ref 7–16)
APPEARANCE UR: CLEAR
AST SERPL-CCNC: 18 U/L (ref 12–45)
BASOPHILS # BLD AUTO: 0.4 % (ref 0–1.8)
BASOPHILS # BLD: 0.02 K/UL (ref 0–0.12)
BILIRUB SERPL-MCNC: 0.4 MG/DL (ref 0.1–1.5)
BILIRUB UR QL STRIP.AUTO: NEGATIVE
BUN SERPL-MCNC: 13 MG/DL (ref 8–22)
CALCIUM SERPL-MCNC: 9.6 MG/DL (ref 8.5–10.5)
CHLORIDE SERPL-SCNC: 98 MMOL/L (ref 96–112)
CHOLEST SERPL-MCNC: 168 MG/DL (ref 100–199)
CO2 SERPL-SCNC: 23 MMOL/L (ref 20–33)
COLOR UR: YELLOW
CREAT SERPL-MCNC: 0.64 MG/DL (ref 0.5–1.4)
EOSINOPHIL # BLD AUTO: 0.03 K/UL (ref 0–0.51)
EOSINOPHIL NFR BLD: 0.5 % (ref 0–6.9)
ERYTHROCYTE [DISTWIDTH] IN BLOOD BY AUTOMATED COUNT: 43.5 FL (ref 35.9–50)
FASTING STATUS PATIENT QL REPORTED: NORMAL
GLOBULIN SER CALC-MCNC: 3.3 G/DL (ref 1.9–3.5)
GLUCOSE SERPL-MCNC: 87 MG/DL (ref 65–99)
GLUCOSE UR STRIP.AUTO-MCNC: NEGATIVE MG/DL
HCT VFR BLD AUTO: 41.3 % (ref 37–47)
HCV AB SER QL: NORMAL
HDLC SERPL-MCNC: 61 MG/DL
HGB BLD-MCNC: 13 G/DL (ref 12–16)
HIV 1+2 AB+HIV1 P24 AG SERPL QL IA: NORMAL
IMM GRANULOCYTES # BLD AUTO: 0.02 K/UL (ref 0–0.11)
IMM GRANULOCYTES NFR BLD AUTO: 0.4 % (ref 0–0.9)
KETONES UR STRIP.AUTO-MCNC: NEGATIVE MG/DL
LDLC SERPL CALC-MCNC: 80 MG/DL
LEUKOCYTE ESTERASE UR QL STRIP.AUTO: NEGATIVE
LYMPHOCYTES # BLD AUTO: 1.89 K/UL (ref 1–4.8)
LYMPHOCYTES NFR BLD: 33.2 % (ref 22–41)
MCH RBC QN AUTO: 29.9 PG (ref 27–33)
MCHC RBC AUTO-ENTMCNC: 31.5 G/DL (ref 33.6–35)
MCV RBC AUTO: 94.9 FL (ref 81.4–97.8)
MICRO URNS: NORMAL
MONOCYTES # BLD AUTO: 0.6 K/UL (ref 0–0.85)
MONOCYTES NFR BLD AUTO: 10.5 % (ref 0–13.4)
NEUTROPHILS # BLD AUTO: 3.13 K/UL (ref 2–7.15)
NEUTROPHILS NFR BLD: 55 % (ref 44–72)
NITRITE UR QL STRIP.AUTO: NEGATIVE
NRBC # BLD AUTO: 0 K/UL
NRBC BLD-RTO: 0 /100 WBC
PH UR STRIP.AUTO: 6 [PH] (ref 5–8)
PLATELET # BLD AUTO: 261 K/UL (ref 164–446)
PMV BLD AUTO: 12 FL (ref 9–12.9)
POTASSIUM SERPL-SCNC: 4.1 MMOL/L (ref 3.6–5.5)
PROT SERPL-MCNC: 8.1 G/DL (ref 6–8.2)
PROT UR QL STRIP: NEGATIVE MG/DL
RBC # BLD AUTO: 4.35 M/UL (ref 4.2–5.4)
RBC UR QL AUTO: NEGATIVE
SODIUM SERPL-SCNC: 137 MMOL/L (ref 135–145)
SP GR UR STRIP.AUTO: 1.02
TREPONEMA PALLIDUM IGG+IGM AB [PRESENCE] IN SERUM OR PLASMA BY IMMUNOASSAY: NORMAL
TRIGL SERPL-MCNC: 134 MG/DL (ref 0–149)
UROBILINOGEN UR STRIP.AUTO-MCNC: 0.2 MG/DL
WBC # BLD AUTO: 5.7 K/UL (ref 4.8–10.8)

## 2020-10-13 PROCEDURE — 80061 LIPID PANEL: CPT

## 2020-10-13 PROCEDURE — 87591 N.GONORRHOEAE DNA AMP PROB: CPT

## 2020-10-13 PROCEDURE — 85025 COMPLETE CBC W/AUTO DIFF WBC: CPT

## 2020-10-13 PROCEDURE — 36415 COLL VENOUS BLD VENIPUNCTURE: CPT

## 2020-10-13 PROCEDURE — 86694 HERPES SIMPLEX NES ANTBDY: CPT

## 2020-10-13 PROCEDURE — 86803 HEPATITIS C AB TEST: CPT

## 2020-10-13 PROCEDURE — 87491 CHLMYD TRACH DNA AMP PROBE: CPT

## 2020-10-13 PROCEDURE — 86780 TREPONEMA PALLIDUM: CPT

## 2020-10-13 PROCEDURE — 87389 HIV-1 AG W/HIV-1&-2 AB AG IA: CPT

## 2020-10-13 PROCEDURE — 81003 URINALYSIS AUTO W/O SCOPE: CPT

## 2020-10-13 PROCEDURE — 82306 VITAMIN D 25 HYDROXY: CPT

## 2020-10-13 PROCEDURE — 80053 COMPREHEN METABOLIC PANEL: CPT

## 2020-10-14 LAB
C TRACH DNA SPEC QL NAA+PROBE: NEGATIVE
N GONORRHOEA DNA SPEC QL NAA+PROBE: NEGATIVE
SPECIMEN SOURCE: NORMAL

## 2020-10-18 LAB
HSV1+2 IGG SER IA-ACNC: >22.4 IV
HSV1+2 IGM SER IA-ACNC: 0.95 IV

## 2020-11-16 DIAGNOSIS — Z78.9 USES BIRTH CONTROL: ICD-10-CM

## 2020-11-17 RX ORDER — NORETHINDRONE 0.35 MG/1
TABLET ORAL
Qty: 84 TAB | Refills: 1 | Status: SHIPPED | OUTPATIENT
Start: 2020-11-17 | End: 2021-06-08 | Stop reason: SDUPTHER

## 2021-06-08 DIAGNOSIS — Z78.9 USES BIRTH CONTROL: ICD-10-CM

## 2021-06-10 RX ORDER — ACETAMINOPHEN AND CODEINE PHOSPHATE 120; 12 MG/5ML; MG/5ML
1 SOLUTION ORAL
Qty: 84 TABLET | Refills: 0 | Status: SHIPPED | OUTPATIENT
Start: 2021-06-10 | End: 2021-09-07

## 2021-07-09 ENCOUNTER — HOSPITAL ENCOUNTER (OUTPATIENT)
Facility: MEDICAL CENTER | Age: 31
End: 2021-07-09
Attending: NURSE PRACTITIONER
Payer: COMMERCIAL

## 2021-07-09 ENCOUNTER — OFFICE VISIT (OUTPATIENT)
Dept: MEDICAL GROUP | Facility: PHYSICIAN GROUP | Age: 31
End: 2021-07-09
Payer: COMMERCIAL

## 2021-07-09 VITALS
HEIGHT: 68 IN | HEART RATE: 86 BPM | TEMPERATURE: 97.8 F | SYSTOLIC BLOOD PRESSURE: 128 MMHG | DIASTOLIC BLOOD PRESSURE: 80 MMHG | OXYGEN SATURATION: 100 % | BODY MASS INDEX: 22.73 KG/M2 | WEIGHT: 150 LBS

## 2021-07-09 DIAGNOSIS — N30.00 ACUTE CYSTITIS WITHOUT HEMATURIA: ICD-10-CM

## 2021-07-09 LAB
APPEARANCE UR: CLEAR
BILIRUB UR STRIP-MCNC: NEGATIVE MG/DL
COLOR UR AUTO: YELLOW
GLUCOSE UR STRIP.AUTO-MCNC: NEGATIVE MG/DL
KETONES UR STRIP.AUTO-MCNC: NEGATIVE MG/DL
LEUKOCYTE ESTERASE UR QL STRIP.AUTO: NORMAL
NITRITE UR QL STRIP.AUTO: NEGATIVE
PH UR STRIP.AUTO: 7 [PH] (ref 5–8)
PROT UR QL STRIP: NEGATIVE MG/DL
RBC UR QL AUTO: NEGATIVE
SP GR UR STRIP.AUTO: 1.02
UROBILINOGEN UR STRIP-MCNC: 0.2 MG/DL

## 2021-07-09 PROCEDURE — 99213 OFFICE O/P EST LOW 20 MIN: CPT | Performed by: NURSE PRACTITIONER

## 2021-07-09 PROCEDURE — 87086 URINE CULTURE/COLONY COUNT: CPT

## 2021-07-09 PROCEDURE — 81002 URINALYSIS NONAUTO W/O SCOPE: CPT | Performed by: NURSE PRACTITIONER

## 2021-07-09 RX ORDER — NITROFURANTOIN 25; 75 MG/1; MG/1
100 CAPSULE ORAL EVERY 12 HOURS
Qty: 10 CAPSULE | Refills: 0 | Status: SHIPPED | OUTPATIENT
Start: 2021-07-09 | End: 2021-07-14

## 2021-07-09 RX ORDER — RIZATRIPTAN BENZOATE 10 MG/1
TABLET ORAL
COMMUNITY
Start: 2021-05-19 | End: 2023-11-30

## 2021-07-09 ASSESSMENT — FIBROSIS 4 INDEX: FIB4 SCORE: 0.57

## 2021-07-09 ASSESSMENT — PATIENT HEALTH QUESTIONNAIRE - PHQ9: CLINICAL INTERPRETATION OF PHQ2 SCORE: 0

## 2021-07-09 NOTE — PROGRESS NOTES
"  Chief Complaint   Patient presents with   • UTI       HPI:  Symptom onset: 3 days ago   Current symptoms: Painful, urgent, frequent voids. No blood noted in urine.  Since onset symptoms are: Unchanged  Treatments tried: OTC antispasm med.  Associated symptoms: Negative for fever, flank pain, nausea and vomiting, vaginal discharge, pelvic pain.  History is negative for frequent UTI.     ROS:  Denies fever, chills, vomiting or abdominal pain.     OBJECTIVE:  /80 (BP Location: Right arm, Patient Position: Sitting, BP Cuff Size: Adult)   Pulse 86   Temp 36.6 °C (97.8 °F) (Temporal)   Ht 1.727 m (5' 8\")   Wt 68 kg (150 lb)   SpO2 100%   Gen: Alert, NAD.  Chest: Lungs clear to auscultation,   CV RRR.  Abdomen: Soft, tender in suprapubic region. No CVAT. Normal bowel sounds.     Lab Results   Component Value Date    POCCOLOR Light yellow 08/12/2020    POCAPPEAR cloudy 08/12/2020    POCLEUKEST sMALL 08/12/2020    POCNITRITE Positive 08/12/2020    POCUROBILIGE 0.2 08/12/2020    POCPROTEIN negative 08/12/2020    POCURPH 6.5 08/12/2020    POCBLOOD trace 08/12/2020    POCSPGRV 1.015 08/12/2020    POCKETONES negative 08/12/2020    POCBILIRUBIN negative 08/12/2020    POCGLUCUA negative 08/12/2020          ASSESSMENT/PLAN:     1. Acute cystitis without hematuria          1. Abnormal urine dipstick in office. Urine sent for culture. Start antibiotics.  2. Provided education to drink plenty of fluids, wipe front to back every void and bowel movement.   3. Return to clinic if symptoms not improving within 3-4 days or in case of vomiting, fever, increasing pain.  "

## 2021-07-11 LAB
BACTERIA UR CULT: NORMAL
SIGNIFICANT IND 70042: NORMAL
SITE SITE: NORMAL
SOURCE SOURCE: NORMAL

## 2021-09-07 DIAGNOSIS — Z78.9 USES BIRTH CONTROL: ICD-10-CM

## 2021-09-07 RX ORDER — NORETHINDRONE 0.35 MG/1
TABLET ORAL
Qty: 84 TABLET | Refills: 0 | Status: SHIPPED | OUTPATIENT
Start: 2021-09-07 | End: 2021-11-19

## 2021-11-15 ENCOUNTER — OFFICE VISIT (OUTPATIENT)
Dept: URGENT CARE | Facility: PHYSICIAN GROUP | Age: 31
End: 2021-11-15
Payer: COMMERCIAL

## 2021-11-15 ENCOUNTER — HOSPITAL ENCOUNTER (OUTPATIENT)
Facility: MEDICAL CENTER | Age: 31
End: 2021-11-15
Attending: PHYSICIAN ASSISTANT
Payer: COMMERCIAL

## 2021-11-15 VITALS
BODY MASS INDEX: 22.73 KG/M2 | OXYGEN SATURATION: 93 % | SYSTOLIC BLOOD PRESSURE: 122 MMHG | RESPIRATION RATE: 16 BRPM | WEIGHT: 150 LBS | DIASTOLIC BLOOD PRESSURE: 86 MMHG | HEART RATE: 106 BPM | TEMPERATURE: 98.7 F | HEIGHT: 68 IN

## 2021-11-15 DIAGNOSIS — J02.9 PHARYNGITIS, UNSPECIFIED ETIOLOGY: ICD-10-CM

## 2021-11-15 DIAGNOSIS — Z20.822 SUSPECTED COVID-19 VIRUS INFECTION: ICD-10-CM

## 2021-11-15 LAB
INT CON NEG: NORMAL
INT CON POS: NORMAL
S PYO AG THROAT QL: NEGATIVE

## 2021-11-15 PROCEDURE — U0005 INFEC AGEN DETEC AMPLI PROBE: HCPCS

## 2021-11-15 PROCEDURE — U0003 INFECTIOUS AGENT DETECTION BY NUCLEIC ACID (DNA OR RNA); SEVERE ACUTE RESPIRATORY SYNDROME CORONAVIRUS 2 (SARS-COV-2) (CORONAVIRUS DISEASE [COVID-19]), AMPLIFIED PROBE TECHNIQUE, MAKING USE OF HIGH THROUGHPUT TECHNOLOGIES AS DESCRIBED BY CMS-2020-01-R: HCPCS

## 2021-11-15 PROCEDURE — 87880 STREP A ASSAY W/OPTIC: CPT | Mod: CS | Performed by: PHYSICIAN ASSISTANT

## 2021-11-15 PROCEDURE — 99213 OFFICE O/P EST LOW 20 MIN: CPT | Mod: CS | Performed by: PHYSICIAN ASSISTANT

## 2021-11-15 ASSESSMENT — ENCOUNTER SYMPTOMS
FEVER: 0
SORE THROAT: 1
SPUTUM PRODUCTION: 0
VOMITING: 0
DIARRHEA: 0
COUGH: 1
PALPITATIONS: 0
ABDOMINAL PAIN: 0
HEADACHES: 1
SHORTNESS OF BREATH: 0
CHILLS: 0
NAUSEA: 0
WHEEZING: 0

## 2021-11-15 ASSESSMENT — FIBROSIS 4 INDEX: FIB4 SCORE: 0.57

## 2021-11-15 NOTE — PROGRESS NOTES
Subjective:   Tiffany Francois is a 30 y.o. female who presents for Sore Throat (lost her voice,chills,not eating,dry cough,x3 days)      HPI  30 y.o. female presents to urgent care with new problem to provider of sore throat, loss of voice onset 1 week ago with new onset of dry cough, chills, decreased appetite, and fatigue over the last 3 days.  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  She denies fevers or shortness of breath.  She reports chest pressure with cough. Denies other associated aggravating or alleviating factors.     Review of Systems   Constitutional: Positive for malaise/fatigue. Negative for chills and fever.   HENT: Positive for congestion, ear pain and sore throat.    Respiratory: Positive for cough. Negative for sputum production, shortness of breath and wheezing.    Cardiovascular: Negative for chest pain and palpitations.   Gastrointestinal: Negative for abdominal pain, diarrhea, nausea and vomiting.   Neurological: Positive for headaches.       Patient Active Problem List   Diagnosis   • Uses birth control   • Migraine headache   • Essential hypertension     Past Surgical History:   Procedure Laterality Date   • APPENDECTOMY       Social History     Tobacco Use   • Smoking status: Never Smoker   • Smokeless tobacco: Never Used   Vaping Use   • Vaping Use: Never used   Substance Use Topics   • Alcohol use: Yes     Comment: occassional - twice monthly at most   • Drug use: No      Family History   Problem Relation Age of Onset   • Hypertension Maternal Aunt    • Diabetes Maternal Uncle    • Hypertension Maternal Uncle    • Diabetes Paternal Grandfather    • Heart Disease Paternal Grandfather    • No Known Problems Mother    • No Known Problems Father    • Cancer Paternal Grandmother         breast      (Allergies, Medications, & Tobacco/Substance Use were reconciled by the Medical Assistant and reviewed by myself. The  "family history is prepopulated)     Objective:     /86 (BP Location: Left arm, Patient Position: Sitting, BP Cuff Size: Adult)   Pulse (!) 106   Temp 37.1 °C (98.7 °F) (Temporal)   Resp 16   Ht 1.727 m (5' 8\")   Wt 68 kg (150 lb)   SpO2 93%   BMI 22.81 kg/m²     Physical Exam  Vitals reviewed.   Constitutional:       General: She is not in acute distress.     Appearance: Normal appearance. She is not ill-appearing or diaphoretic.   HENT:      Head: Normocephalic and atraumatic.      Nose: Congestion present.      Mouth/Throat:      Mouth: Mucous membranes are moist.      Pharynx: No oropharyngeal exudate or posterior oropharyngeal erythema.   Eyes:      Conjunctiva/sclera: Conjunctivae normal.   Cardiovascular:      Rate and Rhythm: Normal rate and regular rhythm.      Heart sounds: Normal heart sounds. No murmur heard.  No friction rub. No gallop.    Pulmonary:      Effort: Pulmonary effort is normal. No respiratory distress.      Breath sounds: Normal breath sounds. No wheezing, rhonchi or rales.   Musculoskeletal:         General: Normal range of motion.      Cervical back: Normal range of motion and neck supple.   Lymphadenopathy:      Cervical: No cervical adenopathy.   Skin:     General: Skin is warm and dry.      Findings: No rash.   Neurological:      General: No focal deficit present.      Mental Status: She is alert and oriented to person, place, and time.   Psychiatric:         Mood and Affect: Mood normal.         Behavior: Behavior normal.         Thought Content: Thought content normal.         Judgment: Judgment normal.         Assessment/Plan:     1. Pharyngitis, unspecified etiology  POCT Rapid Strep A   2. Suspected COVID-19 virus infection  COVID/SARS CoV-2 PCR     Results for orders placed or performed in visit on 11/15/21   POCT Rapid Strep A   Result Value Ref Range    Rapid Strep Screen negative     Internal Control Positive Valid     Internal Control Negative Valid        Patient " instructed to self-isolate/quarantine per CDC guidelines.  I will follow-up pending COVID-19 testing. Discussed with patient may obtain hard copy of results on [facility].   Advised patient symptoms are most likely viral in etiology. Increased fluids and rest. Discussed use of OTC cough and cold medication and Tylenol/Motrin for symptomatic relief.  Return for reevaluation or proceed to ED if symptoms persist or worsen. Supportive care, differential diagnoses, and indications for immediate follow-up discussed with patient. Patient should to proceed to ED for development of symptoms including but not limited to shortness of breath breath, difficulty breathing, or worsening symptoms not manageable at home.   Vital signs stable, patient in no acute respiratory distress.  COVID-19 discharge instructions and CDC guidelines provided to patient in AVS.      Differential diagnosis, natural history, supportive care, and indications for immediate follow-up discussed.    Advised the patient to follow-up with the primary care physician for recheck, reevaluation, and consideration of further management.  Patient verbalized understanding of treatment plan and has no further questions regarding care.   This patient is evaluated under [facility] isolation protocols in urgent care.  Out of an abundance of caution I am wearing a N95 mask, protective eye gear, and gloves through all interaction with patient.    I personally reviewed prior external notes and test results pertinent to today's visit.     Please note that this dictation was created using voice recognition software. I have made a reasonable attempt to correct obvious errors, but I expect that there are errors of grammar and possibly content that I did not discover before finalizing the note.    This note was electronically signed by Katy Valle PA-C

## 2021-11-16 DIAGNOSIS — Z20.822 SUSPECTED COVID-19 VIRUS INFECTION: ICD-10-CM

## 2021-11-16 LAB — COVID ORDER STATUS COVID19: NORMAL

## 2021-11-17 LAB
SARS-COV-2 RNA RESP QL NAA+PROBE: NOTDETECTED
SPECIMEN SOURCE: NORMAL

## 2021-11-19 DIAGNOSIS — Z78.9 USES BIRTH CONTROL: ICD-10-CM

## 2021-11-19 RX ORDER — NORETHINDRONE 0.35 MG/1
TABLET ORAL
Qty: 84 TABLET | Refills: 0 | Status: SHIPPED | OUTPATIENT
Start: 2021-11-19 | End: 2023-01-11 | Stop reason: SDUPTHER

## 2022-08-08 ENCOUNTER — TELEPHONE (OUTPATIENT)
Dept: SCHEDULING | Facility: IMAGING CENTER | Age: 32
End: 2022-08-08

## 2022-09-20 ENCOUNTER — TELEPHONE (OUTPATIENT)
Dept: SCHEDULING | Facility: IMAGING CENTER | Age: 32
End: 2022-09-20

## 2022-10-03 ENCOUNTER — HOSPITAL ENCOUNTER (OUTPATIENT)
Facility: MEDICAL CENTER | Age: 32
End: 2022-10-03
Payer: COMMERCIAL

## 2022-10-03 ENCOUNTER — OFFICE VISIT (OUTPATIENT)
Dept: MEDICAL GROUP | Facility: PHYSICIAN GROUP | Age: 32
End: 2022-10-03
Payer: COMMERCIAL

## 2022-10-03 VITALS
HEART RATE: 110 BPM | HEIGHT: 68 IN | DIASTOLIC BLOOD PRESSURE: 82 MMHG | RESPIRATION RATE: 16 BRPM | TEMPERATURE: 97.9 F | WEIGHT: 164.2 LBS | SYSTOLIC BLOOD PRESSURE: 128 MMHG | BODY MASS INDEX: 24.89 KG/M2 | OXYGEN SATURATION: 96 %

## 2022-10-03 DIAGNOSIS — G43.909 MIGRAINE WITHOUT STATUS MIGRAINOSUS, NOT INTRACTABLE, UNSPECIFIED MIGRAINE TYPE: ICD-10-CM

## 2022-10-03 DIAGNOSIS — Z00.00 WELLNESS EXAMINATION: ICD-10-CM

## 2022-10-03 DIAGNOSIS — N92.6 IRREGULAR MENSES: ICD-10-CM

## 2022-10-03 DIAGNOSIS — I10 ESSENTIAL HYPERTENSION: ICD-10-CM

## 2022-10-03 DIAGNOSIS — Z13.79 GENETIC SCREENING: ICD-10-CM

## 2022-10-03 DIAGNOSIS — Z11.3 SCREENING EXAMINATION FOR SEXUALLY TRANSMITTED DISEASE: ICD-10-CM

## 2022-10-03 PROCEDURE — 99214 OFFICE O/P EST MOD 30 MIN: CPT

## 2022-10-03 SDOH — ECONOMIC STABILITY: INCOME INSECURITY: HOW HARD IS IT FOR YOU TO PAY FOR THE VERY BASICS LIKE FOOD, HOUSING, MEDICAL CARE, AND HEATING?: NOT HARD AT ALL

## 2022-10-03 SDOH — HEALTH STABILITY: PHYSICAL HEALTH: ON AVERAGE, HOW MANY DAYS PER WEEK DO YOU ENGAGE IN MODERATE TO STRENUOUS EXERCISE (LIKE A BRISK WALK)?: 4 DAYS

## 2022-10-03 SDOH — ECONOMIC STABILITY: HOUSING INSECURITY: IN THE LAST 12 MONTHS, HOW MANY PLACES HAVE YOU LIVED?: 1

## 2022-10-03 SDOH — HEALTH STABILITY: PHYSICAL HEALTH: ON AVERAGE, HOW MANY MINUTES DO YOU ENGAGE IN EXERCISE AT THIS LEVEL?: 60 MIN

## 2022-10-03 SDOH — ECONOMIC STABILITY: TRANSPORTATION INSECURITY
IN THE PAST 12 MONTHS, HAS THE LACK OF TRANSPORTATION KEPT YOU FROM MEDICAL APPOINTMENTS OR FROM GETTING MEDICATIONS?: NO

## 2022-10-03 SDOH — ECONOMIC STABILITY: INCOME INSECURITY: IN THE LAST 12 MONTHS, WAS THERE A TIME WHEN YOU WERE NOT ABLE TO PAY THE MORTGAGE OR RENT ON TIME?: YES

## 2022-10-03 SDOH — ECONOMIC STABILITY: HOUSING INSECURITY
IN THE LAST 12 MONTHS, WAS THERE A TIME WHEN YOU DID NOT HAVE A STEADY PLACE TO SLEEP OR SLEPT IN A SHELTER (INCLUDING NOW)?: NO

## 2022-10-03 SDOH — HEALTH STABILITY: MENTAL HEALTH
STRESS IS WHEN SOMEONE FEELS TENSE, NERVOUS, ANXIOUS, OR CAN'T SLEEP AT NIGHT BECAUSE THEIR MIND IS TROUBLED. HOW STRESSED ARE YOU?: TO SOME EXTENT

## 2022-10-03 SDOH — ECONOMIC STABILITY: HOUSING INSECURITY
IN THE LAST 12 MONTHS, WAS THERE A TIME WHEN YOU DID NOT HAVE A STEADY PLACE TO SLEEP OR SLEPT IN A SHELTER (INCLUDING NOW)?: YES

## 2022-10-03 SDOH — ECONOMIC STABILITY: TRANSPORTATION INSECURITY
IN THE PAST 12 MONTHS, HAS LACK OF TRANSPORTATION KEPT YOU FROM MEETINGS, WORK, OR FROM GETTING THINGS NEEDED FOR DAILY LIVING?: NO

## 2022-10-03 SDOH — ECONOMIC STABILITY: FOOD INSECURITY: WITHIN THE PAST 12 MONTHS, YOU WORRIED THAT YOUR FOOD WOULD RUN OUT BEFORE YOU GOT MONEY TO BUY MORE.: NEVER TRUE

## 2022-10-03 SDOH — ECONOMIC STABILITY: TRANSPORTATION INSECURITY
IN THE PAST 12 MONTHS, HAS LACK OF RELIABLE TRANSPORTATION KEPT YOU FROM MEDICAL APPOINTMENTS, MEETINGS, WORK OR FROM GETTING THINGS NEEDED FOR DAILY LIVING?: NO

## 2022-10-03 ASSESSMENT — SOCIAL DETERMINANTS OF HEALTH (SDOH)
IN A TYPICAL WEEK, HOW MANY TIMES DO YOU TALK ON THE PHONE WITH FAMILY, FRIENDS, OR NEIGHBORS?: MORE THAN THREE TIMES A WEEK
HOW OFTEN DO YOU HAVE SIX OR MORE DRINKS ON ONE OCCASION: LESS THAN MONTHLY
ARE YOU MARRIED, WIDOWED, DIVORCED, SEPARATED, NEVER MARRIED, OR LIVING WITH A PARTNER?: LIVING WITH PARTNER
DO YOU BELONG TO ANY CLUBS OR ORGANIZATIONS SUCH AS CHURCH GROUPS UNIONS, FRATERNAL OR ATHLETIC GROUPS, OR SCHOOL GROUPS?: NO
HOW HARD IS IT FOR YOU TO PAY FOR THE VERY BASICS LIKE FOOD, HOUSING, MEDICAL CARE, AND HEATING?: NOT HARD AT ALL
ARE YOU MARRIED, WIDOWED, DIVORCED, SEPARATED, NEVER MARRIED, OR LIVING WITH A PARTNER?: LIVING WITH PARTNER
HOW OFTEN DO YOU ATTEND CHURCH OR RELIGIOUS SERVICES?: PATIENT DECLINED
DO YOU BELONG TO ANY CLUBS OR ORGANIZATIONS SUCH AS CHURCH GROUPS UNIONS, FRATERNAL OR ATHLETIC GROUPS, OR SCHOOL GROUPS?: NO
HOW MANY DRINKS CONTAINING ALCOHOL DO YOU HAVE ON A TYPICAL DAY WHEN YOU ARE DRINKING: 3 OR 4
IN A TYPICAL WEEK, HOW MANY TIMES DO YOU TALK ON THE PHONE WITH FAMILY, FRIENDS, OR NEIGHBORS?: MORE THAN THREE TIMES A WEEK
HOW OFTEN DO YOU HAVE A DRINK CONTAINING ALCOHOL: 2-4 TIMES A MONTH
HOW OFTEN DO YOU GET TOGETHER WITH FRIENDS OR RELATIVES?: TWICE A WEEK
WITHIN THE PAST 12 MONTHS, YOU WORRIED THAT YOUR FOOD WOULD RUN OUT BEFORE YOU GOT THE MONEY TO BUY MORE: NEVER TRUE
HOW OFTEN DO YOU ATTEND CHURCH OR RELIGIOUS SERVICES?: PATIENT DECLINED
HOW OFTEN DO YOU ATTENT MEETINGS OF THE CLUB OR ORGANIZATION YOU BELONG TO?: PATIENT DECLINED
HOW OFTEN DO YOU ATTENT MEETINGS OF THE CLUB OR ORGANIZATION YOU BELONG TO?: PATIENT DECLINED
HOW OFTEN DO YOU GET TOGETHER WITH FRIENDS OR RELATIVES?: TWICE A WEEK

## 2022-10-03 ASSESSMENT — LIFESTYLE VARIABLES
HOW OFTEN DO YOU HAVE SIX OR MORE DRINKS ON ONE OCCASION: LESS THAN MONTHLY
AUDIT-C TOTAL SCORE: 4
HOW MANY STANDARD DRINKS CONTAINING ALCOHOL DO YOU HAVE ON A TYPICAL DAY: 3 OR 4
SKIP TO QUESTIONS 9-10: 0
HOW OFTEN DO YOU HAVE A DRINK CONTAINING ALCOHOL: 2-4 TIMES A MONTH

## 2022-10-03 ASSESSMENT — ENCOUNTER SYMPTOMS
ABDOMINAL PAIN: 0
HEADACHES: 1
HEARTBURN: 0
CONSTIPATION: 0
DIARRHEA: 0
NERVOUS/ANXIOUS: 1
PALPITATIONS: 0
WHEEZING: 0
CHILLS: 0
SHORTNESS OF BREATH: 0
NAUSEA: 0
VOMITING: 0
DIZZINESS: 0
COUGH: 0
DEPRESSION: 0
FEVER: 0

## 2022-10-03 ASSESSMENT — FIBROSIS 4 INDEX: FIB4 SCORE: 0.59

## 2022-10-03 ASSESSMENT — PATIENT HEALTH QUESTIONNAIRE - PHQ9: CLINICAL INTERPRETATION OF PHQ2 SCORE: 0

## 2022-10-03 NOTE — ASSESSMENT & PLAN NOTE
Chronic, stable.  This condition is managed by pain management provider.  Patient endorses symptoms occurring 2-3 times monthly.  Symptoms are relieved with ibuprofen or Excedrin with onset of symptoms, if unrelieved with these measures she will use Ubrelvy or rizatriptan.  Continue Ubrelvy, as needed continue rizatriptan as needed.

## 2022-10-03 NOTE — ASSESSMENT & PLAN NOTE
Chronic, stable.  Blood pressure in office today 128/82.  Patient endorses blood pressure elevated when anxious or stressed.

## 2022-10-03 NOTE — PROGRESS NOTES
"Subjective:     CC: Patient presents today to establish care.  Prior PCP JESUS Harris.    HPI:   Tiffany presents today with    Problem   Irregular Menses   Migraine Headache   Essential Hypertension       Health Maintenance: declines flu vaccine today, will follow up for Pap.  Diet: Endorses eating \"well\", 3 meals daily with snacking including a variety of fresh veggies/fruits, lean meats, and limited fast food/junk food/soda  Exercise: 150 minutes/week hiking  Substance Abuse: no  Safe in relationship. Yes, boyfriend  Seat belts, bike helmet, gun safety discussed.  Sun protection used.    ROS:  Review of Systems   Constitutional:  Negative for chills and fever.   Respiratory:  Negative for cough, shortness of breath and wheezing.    Cardiovascular:  Negative for chest pain and palpitations.   Gastrointestinal:  Negative for abdominal pain, constipation, diarrhea, heartburn, nausea and vomiting.   Genitourinary:  Negative for dysuria.        Irregular menses, less than 28 days, spotting between cycles, moderate cramping, flow is heavy at times .   Musculoskeletal:  Positive for joint pain (left distal fibula fracture 9/10/22, closed reduction/fixation with boot.).   Skin:  Negative for rash.   Neurological:  Positive for headaches (migraines 2-3 times monthly, well controlled with ibuprofen/excedrine or ubelvy if needed). Negative for dizziness.   Psychiatric/Behavioral:  Negative for depression. The patient is nervous/anxious (with flying).    All other systems reviewed and are negative.    Objective:     Exam:  /82 (BP Location: Left arm, Patient Position: Sitting, BP Cuff Size: Small adult)   Pulse (!) 110   Temp 36.6 °C (97.9 °F) (Temporal)   Resp 16   Ht 1.727 m (5' 8\")   Wt 74.5 kg (164 lb 3.2 oz)   SpO2 96%   BMI 24.97 kg/m²  Body mass index is 24.97 kg/m².    Physical Exam  Vitals reviewed.   Constitutional:       Appearance: Normal appearance.   HENT:      Head: Normocephalic and " atraumatic.      Right Ear: Tympanic membrane, ear canal and external ear normal.      Left Ear: Tympanic membrane, ear canal and external ear normal.      Nose:      Comments: Mask in place     Mouth/Throat:      Comments: Mask in place  Eyes:      Extraocular Movements: Extraocular movements intact.      Conjunctiva/sclera: Conjunctivae normal.      Pupils: Pupils are equal, round, and reactive to light.   Neck:      Thyroid: No thyromegaly.   Cardiovascular:      Rate and Rhythm: Normal rate and regular rhythm.      Pulses: Normal pulses.      Heart sounds: Normal heart sounds. No murmur heard.  Pulmonary:      Effort: Pulmonary effort is normal. No respiratory distress.      Breath sounds: Normal breath sounds. No wheezing.   Abdominal:      General: Bowel sounds are normal.      Palpations: Abdomen is soft.   Musculoskeletal:      Cervical back: Full passive range of motion without pain.      Comments: Walking boot to the left lower extremity status post distal fibula fracture September 2022   Lymphadenopathy:      Cervical: No cervical adenopathy.   Skin:     General: Skin is warm and dry.      Capillary Refill: Capillary refill takes less than 2 seconds.   Neurological:      General: No focal deficit present.      Mental Status: She is alert and oriented to person, place, and time.      Cranial Nerves: No cranial nerve deficit.      Sensory: No sensory deficit.      Motor: No weakness.   Psychiatric:         Mood and Affect: Mood normal.         Behavior: Behavior normal.       Assessment & Plan:     31 y.o. female with the following -     Problem List Items Addressed This Visit       Migraine headache     Chronic, stable.  This condition is managed by pain management provider.  Patient endorses symptoms occurring 2-3 times monthly.  Symptoms are relieved with ibuprofen or Excedrin with onset of symptoms, if unrelieved with these measures she will use Ubrelvy or rizatriptan.  Continue Ubrelvy, as needed  continue rizatriptan as needed.         Essential hypertension     Chronic, stable.  Blood pressure in office today 128/82.  Patient endorses blood pressure elevated when anxious or stressed.         Irregular menses     Chronic, stable.  Patient reports she has been having intermittent spotting between periods, occasional heavy menses.  Would not like to change birth control pill today but we can discuss this at her next visit.            Other Visit Diagnoses       Wellness examination        Relevant Orders    HEMOGLOBIN A1C    VITAMIN D,25 HYDROXY (DEFICIENCY)    TSH    Comp Metabolic Panel    CBC WITHOUT DIFFERENTIAL    LIPID PANEL    Screening examination for sexually transmitted disease        Relevant Orders    HIV AG/AB Combo Assay Screening    T.Pallidum AB LISSETT (Screening)    Trichomonas Vaginalis by TMA    Hepatitis C Virus Antibody    HEP B Surface Antibody    Hep B Core AB Total    Hep B Surface Antigen    Chlamydia/GC, PCR (Urine)    Genetic screening        Relevant Orders    Referral to Genetic Research Studies          I have placed the below orders and discussed them with an approved delegating provider.  The MA is performing the below orders under the direction of Dr. Murillo.    I spent a total of 38 minutes with record review, exam, communication with the patient, communication with other providers, and documentation of this encounter.    Return in about 4 weeks (around 10/31/2022), or if symptoms worsen or fail to improve, for PAP, Labs.    Please note that this dictation was created using voice recognition software. I have made every reasonable attempt to correct obvious errors, but I expect that there are errors of grammar and possibly content that I did not discover before finalizing the note.

## 2022-10-03 NOTE — ASSESSMENT & PLAN NOTE
Chronic, stable.  Patient reports she has been having intermittent spotting between periods, occasional heavy menses.  Would not like to change birth control pill today but we can discuss this at her next visit.

## 2022-10-10 ENCOUNTER — RESEARCH ENCOUNTER (OUTPATIENT)
Dept: RESEARCH | Facility: WORKSITE | Age: 32
End: 2022-10-10
Payer: COMMERCIAL

## 2022-10-10 DIAGNOSIS — Z00.6 RESEARCH STUDY PATIENT: ICD-10-CM

## 2022-10-11 ENCOUNTER — HOSPITAL ENCOUNTER (OUTPATIENT)
Dept: LAB | Facility: MEDICAL CENTER | Age: 32
End: 2022-10-11
Payer: COMMERCIAL

## 2022-10-11 DIAGNOSIS — Z11.3 SCREENING EXAMINATION FOR SEXUALLY TRANSMITTED DISEASE: ICD-10-CM

## 2022-10-11 DIAGNOSIS — Z00.00 WELLNESS EXAMINATION: ICD-10-CM

## 2022-10-11 LAB
25(OH)D3 SERPL-MCNC: 40 NG/ML (ref 30–100)
ALBUMIN SERPL BCP-MCNC: 4.5 G/DL (ref 3.2–4.9)
ALBUMIN/GLOB SERPL: 1.3 G/DL
ALP SERPL-CCNC: 96 U/L (ref 30–99)
ALT SERPL-CCNC: 15 U/L (ref 2–50)
ANION GAP SERPL CALC-SCNC: 13 MMOL/L (ref 7–16)
AST SERPL-CCNC: 21 U/L (ref 12–45)
BILIRUB SERPL-MCNC: 0.5 MG/DL (ref 0.1–1.5)
BUN SERPL-MCNC: 10 MG/DL (ref 8–22)
CALCIUM SERPL-MCNC: 9.8 MG/DL (ref 8.5–10.5)
CHLORIDE SERPL-SCNC: 100 MMOL/L (ref 96–112)
CHOLEST SERPL-MCNC: 185 MG/DL (ref 100–199)
CO2 SERPL-SCNC: 24 MMOL/L (ref 20–33)
CREAT SERPL-MCNC: 0.7 MG/DL (ref 0.5–1.4)
ERYTHROCYTE [DISTWIDTH] IN BLOOD BY AUTOMATED COUNT: 41.9 FL (ref 35.9–50)
EST. AVERAGE GLUCOSE BLD GHB EST-MCNC: 97 MG/DL
FASTING STATUS PATIENT QL REPORTED: NORMAL
GFR SERPLBLD CREATININE-BSD FMLA CKD-EPI: 117 ML/MIN/1.73 M 2
GLOBULIN SER CALC-MCNC: 3.6 G/DL (ref 1.9–3.5)
GLUCOSE SERPL-MCNC: 88 MG/DL (ref 65–99)
HBA1C MFR BLD: 5 % (ref 4–5.6)
HBV CORE AB SERPL QL IA: NONREACTIVE
HBV SURFACE AB SERPL IA-ACNC: 95.5 MIU/ML (ref 0–10)
HBV SURFACE AG SER QL: NORMAL
HCT VFR BLD AUTO: 41.4 % (ref 37–47)
HCV AB SER QL: NORMAL
HDLC SERPL-MCNC: 52 MG/DL
HGB BLD-MCNC: 13.5 G/DL (ref 12–16)
HIV 1+2 AB+HIV1 P24 AG SERPL QL IA: NORMAL
LDLC SERPL CALC-MCNC: 106 MG/DL
MCH RBC QN AUTO: 30.7 PG (ref 27–33)
MCHC RBC AUTO-ENTMCNC: 32.6 G/DL (ref 33.6–35)
MCV RBC AUTO: 94.1 FL (ref 81.4–97.8)
PLATELET # BLD AUTO: 257 K/UL (ref 164–446)
PMV BLD AUTO: 12.3 FL (ref 9–12.9)
POTASSIUM SERPL-SCNC: 4.2 MMOL/L (ref 3.6–5.5)
PROT SERPL-MCNC: 8.1 G/DL (ref 6–8.2)
RBC # BLD AUTO: 4.4 M/UL (ref 4.2–5.4)
SODIUM SERPL-SCNC: 137 MMOL/L (ref 135–145)
T PALLIDUM AB SER QL IA: NORMAL
TRIGL SERPL-MCNC: 137 MG/DL (ref 0–149)
TSH SERPL DL<=0.005 MIU/L-ACNC: 1.1 UIU/ML (ref 0.38–5.33)
WBC # BLD AUTO: 5.4 K/UL (ref 4.8–10.8)

## 2022-10-11 PROCEDURE — 87491 CHLMYD TRACH DNA AMP PROBE: CPT

## 2022-10-11 PROCEDURE — 83036 HEMOGLOBIN GLYCOSYLATED A1C: CPT

## 2022-10-11 PROCEDURE — 87389 HIV-1 AG W/HIV-1&-2 AB AG IA: CPT

## 2022-10-11 PROCEDURE — 82306 VITAMIN D 25 HYDROXY: CPT

## 2022-10-11 PROCEDURE — 87340 HEPATITIS B SURFACE AG IA: CPT

## 2022-10-11 PROCEDURE — 86803 HEPATITIS C AB TEST: CPT

## 2022-10-11 PROCEDURE — 85027 COMPLETE CBC AUTOMATED: CPT

## 2022-10-11 PROCEDURE — 84443 ASSAY THYROID STIM HORMONE: CPT

## 2022-10-11 PROCEDURE — 86780 TREPONEMA PALLIDUM: CPT

## 2022-10-11 PROCEDURE — 87661 TRICHOMONAS VAGINALIS AMPLIF: CPT

## 2022-10-11 PROCEDURE — 86706 HEP B SURFACE ANTIBODY: CPT

## 2022-10-11 PROCEDURE — 80061 LIPID PANEL: CPT

## 2022-10-11 PROCEDURE — 36415 COLL VENOUS BLD VENIPUNCTURE: CPT

## 2022-10-11 PROCEDURE — 86704 HEP B CORE ANTIBODY TOTAL: CPT

## 2022-10-11 PROCEDURE — 80053 COMPREHEN METABOLIC PANEL: CPT

## 2022-10-11 PROCEDURE — 87591 N.GONORRHOEAE DNA AMP PROB: CPT

## 2022-10-12 LAB
C TRACH DNA SPEC QL NAA+PROBE: POSITIVE
N GONORRHOEA DNA SPEC QL NAA+PROBE: NEGATIVE
SPECIMEN SOURCE: ABNORMAL

## 2022-10-13 DIAGNOSIS — A56.8 C. TRACHOMATIS INFECTION: ICD-10-CM

## 2022-10-13 LAB
SPEC CONTAINER SPEC: NORMAL
SPECIMEN SOURCE: NORMAL
T VAGINALIS RRNA SPEC QL NAA+PROBE: NEGATIVE

## 2022-10-13 RX ORDER — DOXYCYCLINE HYCLATE 100 MG
100 TABLET ORAL 2 TIMES DAILY
Qty: 14 TABLET | Refills: 0 | Status: SHIPPED | OUTPATIENT
Start: 2022-10-13 | End: 2022-12-09

## 2022-11-06 LAB
APOB+LDLR+PCSK9 GENE MUT ANL BLD/T: NOT DETECTED
BRCA1+BRCA2 DEL+DUP + FULL MUT ANL BLD/T: NOT DETECTED
MLH1+MSH2+MSH6+PMS2 GN DEL+DUP+FUL M: NOT DETECTED

## 2022-12-09 ENCOUNTER — OFFICE VISIT (OUTPATIENT)
Dept: MEDICAL GROUP | Facility: PHYSICIAN GROUP | Age: 32
End: 2022-12-09
Payer: COMMERCIAL

## 2022-12-09 ENCOUNTER — HOSPITAL ENCOUNTER (OUTPATIENT)
Facility: MEDICAL CENTER | Age: 32
End: 2022-12-09
Payer: COMMERCIAL

## 2022-12-09 VITALS
WEIGHT: 165 LBS | DIASTOLIC BLOOD PRESSURE: 74 MMHG | HEIGHT: 68 IN | SYSTOLIC BLOOD PRESSURE: 112 MMHG | HEART RATE: 107 BPM | TEMPERATURE: 98 F | BODY MASS INDEX: 25.01 KG/M2

## 2022-12-09 DIAGNOSIS — Z01.419 WELL WOMAN EXAM: ICD-10-CM

## 2022-12-09 DIAGNOSIS — Z11.51 SCREENING FOR HPV (HUMAN PAPILLOMAVIRUS): ICD-10-CM

## 2022-12-09 DIAGNOSIS — Z12.4 SCREENING FOR CERVICAL CANCER: ICD-10-CM

## 2022-12-09 PROCEDURE — 87624 HPV HI-RISK TYP POOLED RSLT: CPT

## 2022-12-09 PROCEDURE — 99395 PREV VISIT EST AGE 18-39: CPT

## 2022-12-09 PROCEDURE — 88175 CYTOPATH C/V AUTO FLUID REDO: CPT

## 2022-12-09 ASSESSMENT — FIBROSIS 4 INDEX: FIB4 SCORE: 0.7

## 2022-12-09 NOTE — PROGRESS NOTES
Subjective:     CC:   Chief Complaint   Patient presents with    Follow-Up     labs    Gynecologic Exam     pap       HPI:   Tiffany Francois is a 32 y.o. female who presents for annual exam, and to review recent lab work. She is feeling well and denies any complaints. Reports intermittent spotting between periods, which is tolerable for her right now and she would like to remain on current BCP.     Ob-Gyn/ History:    Patient has GYN provider: no  /Para:  0/0  Last Pap Smear:  3-4 years ago. no history of abnormal pap smears.  Gyn Surgery:  no.  Current Contraceptive Method:  BCP. Is currently sexually active.  Last menstrual period:  2022.  Periods regular, occasional intermittent spotting between menses. moderate, heavy bleeding day 1-2, changing tampon every 2 hours, no clots. Cramping is variable.   She does take OTC analgesics for cramps.  No significant bloating/fluid retention, pelvic pain, or dyspareunia. No vaginal discharge    Health Maintenance  Cancer screening  Cervical Cancer Screenin22     Infectious disease screening/Immunizations  --STI Screening: negative 10/11/22   --Practices safe sex.  --HIV Screening: negative 10/11/22   --Hepatitis C Screening: negative 10/11/22   --Immunizations:    Influenza: declines    Tetanus: 2028 due    COVID-19: declines    She  has a past medical history of Hypertension and Migraine.  She  has a past surgical history that includes appendectomy.    Family History   Problem Relation Age of Onset    No Known Problems Mother     No Known Problems Father     Hypertension Maternal Aunt     Diabetes Maternal Uncle     Hypertension Maternal Uncle     Breast Cancer Paternal Grandmother     Cancer Paternal Grandmother         breast    Diabetes Paternal Grandfather     Heart Disease Paternal Grandfather     Psychiatric Illness Neg Hx     Colorectal Cancer Neg Hx     Tubal Cancer Neg Hx     Peritoneal Cancer Neg Hx     Ovarian Cancer Neg Hx      Hyperlipidemia Neg Hx     Stroke Neg Hx        Social History     Socioeconomic History    Marital status: Single     Spouse name: Not on file    Number of children: Not on file    Years of education: Not on file    Highest education level: Bachelor's degree (e.g., BA, AB, BS)   Occupational History    Not on file   Tobacco Use    Smoking status: Never    Smokeless tobacco: Never   Vaping Use    Vaping Use: Never used   Substance and Sexual Activity    Alcohol use: Yes     Alcohol/week: 1.2 - 1.8 oz     Types: 2 - 3 Cans of beer per week     Comment: occassional - twice monthly at most    Drug use: No    Sexual activity: Yes     Partners: Male     Birth control/protection: Pill   Other Topics Concern    Not on file   Social History Narrative    Not on file     Social Determinants of Health     Financial Resource Strain: Low Risk     Difficulty of Paying Living Expenses: Not hard at all   Food Insecurity: Unknown    Worried About Running Out of Food in the Last Year: Never true    Ran Out of Food in the Last Year: Not on file   Transportation Needs: No Transportation Needs    Lack of Transportation (Medical): No    Lack of Transportation (Non-Medical): No   Physical Activity: Sufficiently Active    Days of Exercise per Week: 4 days    Minutes of Exercise per Session: 60 min   Stress: Stress Concern Present    Feeling of Stress : To some extent   Social Connections: Unknown    Frequency of Communication with Friends and Family: More than three times a week    Frequency of Social Gatherings with Friends and Family: Twice a week    Attends Christianity Services: Patient refused    Active Member of Clubs or Organizations: No    Attends Club or Organization Meetings: Patient refused    Marital Status: Living with partner   Intimate Partner Violence: Not on file   Housing Stability: High Risk    Unable to Pay for Housing in the Last Year: Yes    Number of Places Lived in the Last Year: 1    Unstable Housing in the Last Year:  No       Patient Active Problem List    Diagnosis Date Noted    Irregular menses 10/03/2022    Uses birth control 10/02/2017    Migraine headache 10/02/2017    Essential hypertension 10/02/2017         Current Outpatient Medications   Medication Sig Dispense Refill    HELEN 0.35 MG tablet TAKE 1 TABLET BY MOUTH EVERY DAY 84 Tablet 0    rizatriptan (MAXALT) 10 MG tablet TAKE 1 TAB BY MOUTH AT THE ONSET OF HEADACHE. MAY REPEAT IN 2 HRS IF NO RELIEF.      UBRELVY 50 MG Tab TAKE 1 TAB BY MOUTH ONSET OF MIRGRAINE. 1 TAB IF NO RELIEF AFTER 2 HRS MAX 2TAB/24HRS *NEEDS PA       No current facility-administered medications for this visit.     No Known Allergies    Review of Systems   Constitutional: Negative for fever, chills and malaise/fatigue.   HENT: Negative for congestion.    Eyes: Negative for pain.    Respiratory: Negative for cough and shortness of breath.  Cardiovascular: Negative for leg swelling.   Gastrointestinal: Negative for nausea, vomiting, abdominal pain and diarrhea.   Genitourinary: Negative for dysuria and hematuria.   Skin: Negative for rash.   Neurological: Negative for dizziness, focal weakness and headaches.   Endo/Heme/Allergies: Does not bleed easily.   Psychiatric/Behavioral: Negative for depression.  The patient is not nervous/anxious.      LABS:    Latest Reference Range & Units 10/11/22 09:20   WBC 4.8 - 10.8 K/uL 5.4   RBC 4.20 - 5.40 M/uL 4.40   Hemoglobin 12.0 - 16.0 g/dL 13.5   Hematocrit 37.0 - 47.0 % 41.4   MCV 81.4 - 97.8 fL 94.1   MCH 27.0 - 33.0 pg 30.7   MCHC 33.6 - 35.0 g/dL 32.6 (L)   RDW 35.9 - 50.0 fL 41.9   Platelet Count 164 - 446 K/uL 257   MPV 9.0 - 12.9 fL 12.3   Sodium 135 - 145 mmol/L 137   Potassium 3.6 - 5.5 mmol/L 4.2   Chloride 96 - 112 mmol/L 100   Co2 20 - 33 mmol/L 24   Anion Gap 7.0 - 16.0  13.0   Glucose 65 - 99 mg/dL 88   Bun 8 - 22 mg/dL 10   Creatinine 0.50 - 1.40 mg/dL 0.70   GFR (CKD-EPI) >60 mL/min/1.73 m 2 117   Calcium 8.5 - 10.5 mg/dL 9.8   AST(SGOT) 12  "- 45 U/L 21   ALT(SGPT) 2 - 50 U/L 15   Alkaline Phosphatase 30 - 99 U/L 96   Total Bilirubin 0.1 - 1.5 mg/dL 0.5   Albumin 3.2 - 4.9 g/dL 4.5   Total Protein 6.0 - 8.2 g/dL 8.1   Globulin 1.9 - 3.5 g/dL 3.6 (H)   A-G Ratio g/dL 1.3   Glycohemoglobin 4.0 - 5.6 % 5.0   Estim. Avg Glu mg/dL 97   Cholesterol,Tot 100 - 199 mg/dL 185   Triglycerides 0 - 149 mg/dL 137   HDL >=40 mg/dL 52   LDL <100 mg/dL 106 (H)   25-Hydroxy   Vitamin D 25 30 - 100 ng/mL 40   TSH 0.380 - 5.330 uIU/mL 1.100   Hep B Surface Antibody Quant 0.00 - 10.00 mIU/mL 95.50 (H)   Hepatitis B Surface Antigen Non-Reactive  Non-Reactive   Hepatitis B Core Ab, Total Non-Reactive  NonReactive   Hepatitis C Antibody Non-Reactive  Non-Reactive   HIV Ag/Ab Combo Assay Non Reactive  Non-Reactive   Syphilis, Treponemal Qual Non-Reactive  Non-Reactive   (L): Data is abnormally low  (H): Data is abnormally high  Objective:     /74 (BP Location: Left arm, Patient Position: Sitting, BP Cuff Size: Small adult)   Pulse (!) 107   Temp 36.7 °C (98 °F) (Temporal)   Ht 1.727 m (5' 8\")   Wt 74.8 kg (165 lb)   LMP 11/23/2022 (Approximate)   BMI 25.09 kg/m²   Body mass index is 25.09 kg/m².  Wt Readings from Last 4 Encounters:   12/09/22 74.8 kg (165 lb)   10/03/22 74.5 kg (164 lb 3.2 oz)   09/14/22 72.6 kg (160 lb)   11/15/21 68 kg (150 lb)       Physical Exam:  Constitutional: Well-developed and well-nourished. Not diaphoretic. No distress.   Skin: Skin is warm and dry. No rash noted.  Head: Atraumatic without lesions.  Eyes: Conjunctivae and extraocular motions are normal. Pupils are equal, round, and reactive to light. No scleral icterus.   Ears:  External ears unremarkable. Tympanic membranes clear and intact.  Neck: Supple, trachea midline. Normal range of motion. No thyromegaly present. No lymphadenopathy--cervical or supraclavicular.  Cardiovascular: Regular rate and rhythm, S1 and S2 without murmur, rubs, or gallops.  Lungs: Normal inspiratory effort, " CTA bilaterally, no wheezes/rhonchi/rales  Abdomen: Soft, non tender, and without distention. Active bowel sounds in all four quadrants. No rebound, guarding, masses or HSM.  :Perineum and external genitalia normal without rash. Vagina with copious, white, and odorless discharge. Cervix without visible lesions or discharge. Bimanual exam without adnexal masses or cervical motion tenderness.  Extremities: No cyanosis, clubbing, erythema, nor edema. Distal pulses intact and symmetric.   Musculoskeletal: All major joints AROM full in all directions without pain.  Neurological: Alert and oriented x 3.Sensation intact.   Psychiatric:  Behavior, mood, and affect are appropriate.    A chaperone was offered to the patient during today's exam. Chaperone name: Dotty Ramirez MA was present.    Assessment and Plan:     1. Screening for cervical cancer  THINPREP PAP WITH HPV      2. Screening for HPV (human papillomavirus)  THINPREP PAP WITH HPV      3. Well woman exam            HCM:  completed   Labs per orders  Immunizations per orders  Patient counseled about skin care, diet, supplements, prenatal vitamins, safe sex and exercise.    Follow-up: Return in about 1 year (around 12/9/2023) for wellness.  I have placed the below orders and discussed them with an approved delegating provider.  The MA is performing the below orders under the direction of Dr. Siddiqi.         Patient

## 2022-12-10 DIAGNOSIS — Z11.51 SCREENING FOR HPV (HUMAN PAPILLOMAVIRUS): ICD-10-CM

## 2022-12-10 DIAGNOSIS — Z12.4 SCREENING FOR CERVICAL CANCER: ICD-10-CM

## 2022-12-12 LAB
CYTOLOGY REG CYTOL: NORMAL
HPV HR 12 DNA CVX QL NAA+PROBE: NEGATIVE
HPV16 DNA SPEC QL NAA+PROBE: NEGATIVE
HPV18 DNA SPEC QL NAA+PROBE: NEGATIVE
SPECIMEN SOURCE: NORMAL

## 2023-11-29 ENCOUNTER — APPOINTMENT (OUTPATIENT)
Dept: MEDICAL GROUP | Facility: PHYSICIAN GROUP | Age: 33
End: 2023-11-29
Payer: COMMERCIAL

## 2023-11-30 ENCOUNTER — OFFICE VISIT (OUTPATIENT)
Dept: MEDICAL GROUP | Facility: PHYSICIAN GROUP | Age: 33
End: 2023-11-30
Payer: COMMERCIAL

## 2023-11-30 ENCOUNTER — HOSPITAL ENCOUNTER (OUTPATIENT)
Dept: LAB | Facility: MEDICAL CENTER | Age: 33
End: 2023-11-30
Payer: COMMERCIAL

## 2023-11-30 VITALS
WEIGHT: 164 LBS | SYSTOLIC BLOOD PRESSURE: 160 MMHG | OXYGEN SATURATION: 98 % | BODY MASS INDEX: 24.86 KG/M2 | DIASTOLIC BLOOD PRESSURE: 122 MMHG | RESPIRATION RATE: 20 BRPM | HEART RATE: 64 BPM | HEIGHT: 68 IN | TEMPERATURE: 97.8 F

## 2023-11-30 DIAGNOSIS — E55.9 VITAMIN D DEFICIENCY: ICD-10-CM

## 2023-11-30 DIAGNOSIS — Z13.29 THYROID DISORDER SCREEN: ICD-10-CM

## 2023-11-30 DIAGNOSIS — I10 ESSENTIAL HYPERTENSION: ICD-10-CM

## 2023-11-30 DIAGNOSIS — Z13.6 SCREENING FOR CARDIOVASCULAR CONDITION: ICD-10-CM

## 2023-11-30 DIAGNOSIS — Z13.0 SCREENING FOR DEFICIENCY ANEMIA: ICD-10-CM

## 2023-11-30 DIAGNOSIS — N92.6 IRREGULAR MENSES: ICD-10-CM

## 2023-11-30 DIAGNOSIS — Z83.3 FAMILY HISTORY OF DIABETES MELLITUS (DM): ICD-10-CM

## 2023-11-30 LAB
25(OH)D3 SERPL-MCNC: 38 NG/ML (ref 30–100)
ALBUMIN SERPL BCP-MCNC: 4.5 G/DL (ref 3.2–4.9)
ALBUMIN/GLOB SERPL: 1.4 G/DL
ALP SERPL-CCNC: 97 U/L (ref 30–99)
ALT SERPL-CCNC: 16 U/L (ref 2–50)
ANION GAP SERPL CALC-SCNC: 11 MMOL/L (ref 7–16)
AST SERPL-CCNC: 16 U/L (ref 12–45)
BILIRUB SERPL-MCNC: 0.4 MG/DL (ref 0.1–1.5)
BUN SERPL-MCNC: 11 MG/DL (ref 8–22)
CALCIUM ALBUM COR SERPL-MCNC: 9 MG/DL (ref 8.5–10.5)
CALCIUM SERPL-MCNC: 9.4 MG/DL (ref 8.5–10.5)
CHLORIDE SERPL-SCNC: 103 MMOL/L (ref 96–112)
CHOLEST SERPL-MCNC: 174 MG/DL (ref 100–199)
CO2 SERPL-SCNC: 25 MMOL/L (ref 20–33)
CREAT SERPL-MCNC: 0.68 MG/DL (ref 0.5–1.4)
ERYTHROCYTE [DISTWIDTH] IN BLOOD BY AUTOMATED COUNT: 41.8 FL (ref 35.9–50)
EST. AVERAGE GLUCOSE BLD GHB EST-MCNC: 100 MG/DL
FASTING STATUS PATIENT QL REPORTED: NORMAL
FSH SERPL-ACNC: 4.7 MIU/ML
GFR SERPLBLD CREATININE-BSD FMLA CKD-EPI: 117 ML/MIN/1.73 M 2
GLOBULIN SER CALC-MCNC: 3.3 G/DL (ref 1.9–3.5)
GLUCOSE SERPL-MCNC: 83 MG/DL (ref 65–99)
HBA1C MFR BLD: 5.1 % (ref 4–5.6)
HCT VFR BLD AUTO: 42 % (ref 37–47)
HDLC SERPL-MCNC: 55 MG/DL
HGB BLD-MCNC: 13.5 G/DL (ref 12–16)
LDLC SERPL CALC-MCNC: 101 MG/DL
LH SERPL-ACNC: 5.8 IU/L
MCH RBC QN AUTO: 29.7 PG (ref 27–33)
MCHC RBC AUTO-ENTMCNC: 32.1 G/DL (ref 32.2–35.5)
MCV RBC AUTO: 92.5 FL (ref 81.4–97.8)
PLATELET # BLD AUTO: 273 K/UL (ref 164–446)
PMV BLD AUTO: 11.5 FL (ref 9–12.9)
POTASSIUM SERPL-SCNC: 4.1 MMOL/L (ref 3.6–5.5)
PROLACTIN SERPL-MCNC: 20.4 NG/ML (ref 2.8–26)
PROT SERPL-MCNC: 7.8 G/DL (ref 6–8.2)
RBC # BLD AUTO: 4.54 M/UL (ref 4.2–5.4)
SODIUM SERPL-SCNC: 139 MMOL/L (ref 135–145)
T4 FREE SERPL-MCNC: 1.41 NG/DL (ref 0.93–1.7)
TRIGL SERPL-MCNC: 89 MG/DL (ref 0–149)
TSH SERPL DL<=0.005 MIU/L-ACNC: 1.08 UIU/ML (ref 0.38–5.33)
WBC # BLD AUTO: 6.2 K/UL (ref 4.8–10.8)

## 2023-11-30 PROCEDURE — 84403 ASSAY OF TOTAL TESTOSTERONE: CPT

## 2023-11-30 PROCEDURE — 80061 LIPID PANEL: CPT

## 2023-11-30 PROCEDURE — 3077F SYST BP >= 140 MM HG: CPT

## 2023-11-30 PROCEDURE — 84146 ASSAY OF PROLACTIN: CPT

## 2023-11-30 PROCEDURE — 84270 ASSAY OF SEX HORMONE GLOBUL: CPT

## 2023-11-30 PROCEDURE — 99214 OFFICE O/P EST MOD 30 MIN: CPT

## 2023-11-30 PROCEDURE — 83001 ASSAY OF GONADOTROPIN (FSH): CPT

## 2023-11-30 PROCEDURE — 85027 COMPLETE CBC AUTOMATED: CPT

## 2023-11-30 PROCEDURE — 84443 ASSAY THYROID STIM HORMONE: CPT

## 2023-11-30 PROCEDURE — 3080F DIAST BP >= 90 MM HG: CPT

## 2023-11-30 PROCEDURE — 36415 COLL VENOUS BLD VENIPUNCTURE: CPT

## 2023-11-30 PROCEDURE — 83036 HEMOGLOBIN GLYCOSYLATED A1C: CPT

## 2023-11-30 PROCEDURE — 83002 ASSAY OF GONADOTROPIN (LH): CPT

## 2023-11-30 PROCEDURE — 84439 ASSAY OF FREE THYROXINE: CPT

## 2023-11-30 PROCEDURE — 82306 VITAMIN D 25 HYDROXY: CPT

## 2023-11-30 PROCEDURE — 83525 ASSAY OF INSULIN: CPT

## 2023-11-30 PROCEDURE — 84402 ASSAY OF FREE TESTOSTERONE: CPT

## 2023-11-30 PROCEDURE — 80053 COMPREHEN METABOLIC PANEL: CPT

## 2023-11-30 RX ORDER — LISINOPRIL AND HYDROCHLOROTHIAZIDE 12.5; 1 MG/1; MG/1
1 TABLET ORAL DAILY
Qty: 30 TABLET | Refills: 1 | Status: SHIPPED | OUTPATIENT
Start: 2023-11-30 | End: 2023-12-14 | Stop reason: SDUPTHER

## 2023-11-30 ASSESSMENT — ENCOUNTER SYMPTOMS
DIZZINESS: 1
HEADACHES: 1

## 2023-11-30 ASSESSMENT — FIBROSIS 4 INDEX: FIB4 SCORE: 0.7

## 2023-11-30 ASSESSMENT — PATIENT HEALTH QUESTIONNAIRE - PHQ9: CLINICAL INTERPRETATION OF PHQ2 SCORE: 0

## 2023-11-30 NOTE — ASSESSMENT & PLAN NOTE
Chronic, unstable. Bp in clinic today 160/122. Reports elevated blood pressure over the past 2 weeks 130-150s/110-120s. Intermittent headaches (has migraines and stress headaches intermittently), dizzy spells intermittently, Denies CP, palpitations, LE swelling. Denies increased life stressors.  Has taken nsaid/ubrelvy for headaches, and hydration/nutrition for dizziness. Has cut out caffeine since noticing elevated blood pressures. Historically has taken propranolol for hypertension.  Positive family history for hypertension.   She is physically active, eats healthy foods, nonsmoker, healthy weight.   Will start lisinopril/hct and follow up in 2 weeks. Keep home record of blood pressure, bring log to follow up.

## 2023-11-30 NOTE — PROGRESS NOTES
"Subjective:     CC: Diagnoses of Essential hypertension, Irregular menses, Thyroid disorder screen, Vitamin D deficiency, Screening for cardiovascular condition, Screening for deficiency anemia, and Family history of diabetes mellitus (DM) were pertinent to this visit.    Pt presents today for hypertension. Reports hypertension   HPI:   Tiffany presents today with    Problem   Irregular Menses   Essential Hypertension     ROS:  Review of Systems   Neurological:  Positive for dizziness and headaches.   All other systems reviewed and are negative.      Objective:     Exam:  BP (!) 160/122 (BP Location: Right arm, Patient Position: Sitting, BP Cuff Size: Adult)   Pulse 64   Temp 36.6 °C (97.8 °F) (Temporal)   Resp 20   Ht 1.727 m (5' 8\")   Wt 74.4 kg (164 lb)   SpO2 98%   BMI 24.94 kg/m²  Body mass index is 24.94 kg/m².    Physical Exam  Vitals reviewed.       Assessment & Plan:     32 y.o. female with the following -     Problem List Items Addressed This Visit       Essential hypertension     Chronic, unstable. Bp in clinic today 160/122. Reports elevated blood pressure over the past 2 weeks 130-150s/110-120s. Intermittent headaches (has migraines and stress headaches intermittently), dizzy spells intermittently, Denies CP, palpitations, LE swelling. Denies increased life stressors.  Has taken nsaid/ubrelvy for headaches, and hydration/nutrition for dizziness. Has cut out caffeine since noticing elevated blood pressures. Historically has taken propranolol for hypertension.  Positive family history for hypertension.   She is physically active, eats healthy foods, nonsmoker, healthy weight.   Will start lisinopril/hct and follow up in 2 weeks. Keep home record of blood pressure, bring log to follow up.           Relevant Medications    lisinopril-hydrochlorothiazide (PRINZIDE) 10-12.5 MG per tablet    Irregular menses     Chronic, unstable. Continues to have intermittent spotting and bleeding between menses. " Reports daily use of OCP for the past 2-3 years.            Relevant Orders    FREE THYROXINE    PROLACTIN    FSH/LH    INSULIN FASTING    TESTOSTERONE F&T FEMALES/CHILD    TSH     Other Visit Diagnoses       Thyroid disorder screen        Relevant Orders    TSH    Vitamin D deficiency        Relevant Orders    VITAMIN D,25 HYDROXY (DEFICIENCY)    Screening for cardiovascular condition        Relevant Orders    Comp Metabolic Panel    Lipid Profile    Screening for deficiency anemia        Relevant Orders    CBC WITHOUT DIFFERENTIAL    Family history of diabetes mellitus (DM)        Relevant Orders    HEMOGLOBIN A1C          Patient was educated in proper administration of medication(s) ordered today including safety, possible SE, risks, benefits, rationale and alternatives to therapy.   Supportive care, differential diagnoses, and indications for immediate follow-up discussed with patient.    Pathogenesis of diagnosis discussed including typical length and natural progression.    Instructed to return to clinic or nearest emergency department for any change in condition, further concerns, or worsening of symptoms.  Patient states understanding of the plan of care and discharge instructions.    Return in about 2 weeks (around 12/14/2023), or if symptoms worsen or fail to improve, for Blood Pressure, Labs.    Please note that this dictation was created using voice recognition software. I have made every reasonable attempt to correct obvious errors, but I expect that there are errors of grammar and possibly content that I did not discover before finalizing the note.

## 2023-12-02 LAB — INSULIN P FAST SERPL-ACNC: 10 UIU/ML (ref 3–25)

## 2023-12-06 LAB
SHBG SERPL-SCNC: 93 NMOL/L (ref 25–122)
TESTOST FREE SERPL-MCNC: 1.9 PG/ML (ref 1.3–9.2)
TESTOST SERPL-MCNC: 23 NG/DL (ref 9–55)

## 2023-12-14 ENCOUNTER — HOSPITAL ENCOUNTER (OUTPATIENT)
Dept: LAB | Facility: MEDICAL CENTER | Age: 33
End: 2023-12-14
Payer: COMMERCIAL

## 2023-12-14 ENCOUNTER — OFFICE VISIT (OUTPATIENT)
Dept: MEDICAL GROUP | Facility: PHYSICIAN GROUP | Age: 33
End: 2023-12-14
Payer: COMMERCIAL

## 2023-12-14 VITALS
BODY MASS INDEX: 24.55 KG/M2 | SYSTOLIC BLOOD PRESSURE: 124 MMHG | HEIGHT: 68 IN | TEMPERATURE: 97.8 F | DIASTOLIC BLOOD PRESSURE: 86 MMHG | HEART RATE: 110 BPM | OXYGEN SATURATION: 95 % | WEIGHT: 162 LBS

## 2023-12-14 DIAGNOSIS — I10 ESSENTIAL HYPERTENSION: ICD-10-CM

## 2023-12-14 LAB
ANION GAP SERPL CALC-SCNC: 10 MMOL/L (ref 7–16)
BUN SERPL-MCNC: 15 MG/DL (ref 8–22)
CALCIUM SERPL-MCNC: 10 MG/DL (ref 8.5–10.5)
CHLORIDE SERPL-SCNC: 100 MMOL/L (ref 96–112)
CO2 SERPL-SCNC: 27 MMOL/L (ref 20–33)
CREAT SERPL-MCNC: 0.7 MG/DL (ref 0.5–1.4)
GFR SERPLBLD CREATININE-BSD FMLA CKD-EPI: 116 ML/MIN/1.73 M 2
GLUCOSE SERPL-MCNC: 94 MG/DL (ref 65–99)
POTASSIUM SERPL-SCNC: 4 MMOL/L (ref 3.6–5.5)
SODIUM SERPL-SCNC: 137 MMOL/L (ref 135–145)

## 2023-12-14 PROCEDURE — 80048 BASIC METABOLIC PNL TOTAL CA: CPT

## 2023-12-14 PROCEDURE — 99213 OFFICE O/P EST LOW 20 MIN: CPT

## 2023-12-14 PROCEDURE — 3074F SYST BP LT 130 MM HG: CPT

## 2023-12-14 PROCEDURE — 36415 COLL VENOUS BLD VENIPUNCTURE: CPT

## 2023-12-14 PROCEDURE — 3079F DIAST BP 80-89 MM HG: CPT

## 2023-12-14 RX ORDER — LISINOPRIL AND HYDROCHLOROTHIAZIDE 12.5; 1 MG/1; MG/1
1 TABLET ORAL DAILY
Qty: 90 TABLET | Refills: 1 | Status: SHIPPED | OUTPATIENT
Start: 2023-12-14

## 2023-12-14 ASSESSMENT — ENCOUNTER SYMPTOMS
BLURRED VISION: 0
PALPITATIONS: 0
DIZZINESS: 1
HEADACHES: 0

## 2023-12-14 ASSESSMENT — FIBROSIS 4 INDEX: FIB4 SCORE: 0.5

## 2023-12-14 NOTE — PROGRESS NOTES
"Subjective:     CC: The encounter diagnosis was Essential hypertension.    Pt presents today for blood pressure follow up and lab review.   HPI:   Tiffany presents today with    Problem   Essential Hypertension     ROS:  Review of Systems   Eyes:  Negative for blurred vision.   Cardiovascular:  Negative for chest pain and palpitations.   Neurological:  Positive for dizziness. Negative for headaches.   All other systems reviewed and are negative.      Objective:     Exam:  /86 (BP Location: Right arm, Patient Position: Sitting, BP Cuff Size: Small adult)   Pulse (!) 110   Temp 36.6 °C (97.8 °F) (Temporal)   Ht 1.727 m (5' 8\")   Wt 73.5 kg (162 lb)   SpO2 95%   BMI 24.63 kg/m²  Body mass index is 24.63 kg/m².    Physical Exam  Vitals reviewed.   Constitutional:       General: She is not in acute distress.     Appearance: Normal appearance. She is not ill-appearing.   HENT:      Head: Normocephalic and atraumatic.   Cardiovascular:      Rate and Rhythm: Normal rate.      Pulses: Normal pulses.   Pulmonary:      Effort: Pulmonary effort is normal. No respiratory distress.   Skin:     General: Skin is warm and dry.      Findings: No rash.   Neurological:      General: No focal deficit present.      Mental Status: She is alert and oriented to person, place, and time.   Psychiatric:         Mood and Affect: Mood normal.         Behavior: Behavior normal.         Labs:    Latest Reference Range & Units 11/30/23 10:42 11/30/23 10:43   WBC 4.8 - 10.8 K/uL  6.2   RBC 4.20 - 5.40 M/uL  4.54   Hemoglobin 12.0 - 16.0 g/dL  13.5   Hematocrit 37.0 - 47.0 %  42.0   MCV 81.4 - 97.8 fL  92.5   MCH 27.0 - 33.0 pg  29.7   MCHC 32.2 - 35.5 g/dL  32.1 (L)   RDW 35.9 - 50.0 fL  41.8   Platelet Count 164 - 446 K/uL  273   MPV 9.0 - 12.9 fL  11.5   Sodium 135 - 145 mmol/L  139   Potassium 3.6 - 5.5 mmol/L  4.1   Chloride 96 - 112 mmol/L  103   Co2 20 - 33 mmol/L  25   Anion Gap 7.0 - 16.0   11.0   Glucose 65 - 99 mg/dL  83 "   Bun 8 - 22 mg/dL  11   Creatinine 0.50 - 1.40 mg/dL  0.68   GFR (CKD-EPI) >60 mL/min/1.73 m 2  117   Calcium 8.5 - 10.5 mg/dL  9.4   Correct Calcium 8.5 - 10.5 mg/dL  9.0   AST(SGOT) 12 - 45 U/L  16   ALT(SGPT) 2 - 50 U/L  16   Alkaline Phosphatase 30 - 99 U/L  97   Total Bilirubin 0.1 - 1.5 mg/dL  0.4   Albumin 3.2 - 4.9 g/dL  4.5   Total Protein 6.0 - 8.2 g/dL  7.8   Globulin 1.9 - 3.5 g/dL  3.3   A-G Ratio g/dL  1.4   Glycohemoglobin 4.0 - 5.6 %  5.1   Estim. Avg Glu mg/dL  100   Fasting Status   Fasting   Cholesterol,Tot 100 - 199 mg/dL  174   Triglycerides 0 - 149 mg/dL  89   HDL >=40 mg/dL  55   LDL <100 mg/dL  101 (H)   25-Hydroxy   Vitamin D 25 30 - 100 ng/mL  38   TSH 0.380 - 5.330 uIU/mL  1.080   Free T-4 0.93 - 1.70 ng/dL  1.41   Follicle Stimulating Hormone mIU/mL  4.7   Insulin Fasting 3 - 25 uIU/mL 10    Luteinizing Hormone IU/L  5.8   Prolactin 2.80 - 26.00 ng/mL  20.40   Sex Hormone Bind Globulin 25 - 122 nmol/L  93   Testosterone Fr LCMS 1.3 - 9.2 pg/mL  1.9   Testosterone,Total 9 - 55 ng/dL  23   (L): Data is abnormally low  (H): Data is abnormally high    Assessment & Plan:     33 y.o. female with the following -     Problem List Items Addressed This Visit       Essential hypertension     Chronic, improving. Bp in clinic today 124/86. Home readings 140-150/100-120 over the past couple weeks. Reports she has not been having headaches, chest pain, palpitations, leg swelling. Tolerating medication well without negative side effects.  Does report intermittent light headedness (2-3 episodes lasting about 1-1.5 hours in the morning) over the past couple weeks.   Has stopped caffeine, staying hydrated. Discussed healthy lifestyle recommendations.   Provided pt with new bp cuff, which is currently providing comparable reading to in clinic manual cuff.  Continue home monitoring.   Goal <130/80  Recheck bmp after starting medication  Consider increasing dose of lisinopril, consider adding ccb.            Relevant Medications    lisinopril-hydrochlorothiazide (PRINZIDE) 10-12.5 MG per tablet    Other Relevant Orders    Basic Metabolic Panel       Patient was educated in proper administration of medication(s) ordered today including safety, possible SE, risks, benefits, rationale and alternatives to therapy.   Supportive care, differential diagnoses, and indications for immediate follow-up discussed with patient.    Pathogenesis of diagnosis discussed including typical length and natural progression.    Instructed to return to clinic or nearest emergency department for any change in condition, further concerns, or worsening of symptoms.  Patient states understanding of the plan of care and discharge instructions.    Return in about 3 months (around 3/14/2024) for Blood Pressure.    Please note that this dictation was created using voice recognition software. I have made every reasonable attempt to correct obvious errors, but I expect that there are errors of grammar and possibly content that I did not discover before finalizing the note.

## 2023-12-14 NOTE — ASSESSMENT & PLAN NOTE
Chronic, improving. Bp in clinic today 124/86. Home readings 140-150/100-120 over the past couple weeks. Reports she has not been having headaches, chest pain, palpitations, leg swelling. Tolerating medication well without negative side effects.  Does report intermittent light headedness (2-3 episodes lasting about 1-1.5 hours in the morning) over the past couple weeks.   Has stopped caffeine, staying hydrated. Discussed healthy lifestyle recommendations.   Provided pt with new bp cuff, which is currently providing comparable reading to in clinic manual cuff.  Continue home monitoring.   Goal <130/80  Recheck bmp after starting medication  Consider increasing dose of lisinopril, consider adding ccb.

## 2023-12-21 DIAGNOSIS — Z78.9 USES BIRTH CONTROL: ICD-10-CM

## 2023-12-22 RX ORDER — NORETHINDRONE 0.35 MG/1
1 TABLET ORAL
Qty: 84 TABLET | Refills: 3 | Status: ON HOLD | OUTPATIENT
Start: 2023-12-22 | End: 2024-03-11

## 2024-03-07 ENCOUNTER — HOSPITAL ENCOUNTER (EMERGENCY)
Facility: MEDICAL CENTER | Age: 34
End: 2024-03-07
Attending: EMERGENCY MEDICINE
Payer: COMMERCIAL

## 2024-03-07 ENCOUNTER — APPOINTMENT (OUTPATIENT)
Dept: RADIOLOGY | Facility: MEDICAL CENTER | Age: 34
End: 2024-03-07
Attending: EMERGENCY MEDICINE
Payer: COMMERCIAL

## 2024-03-07 VITALS
OXYGEN SATURATION: 95 % | DIASTOLIC BLOOD PRESSURE: 59 MMHG | RESPIRATION RATE: 16 BRPM | SYSTOLIC BLOOD PRESSURE: 117 MMHG | WEIGHT: 156.53 LBS | TEMPERATURE: 97.4 F | BODY MASS INDEX: 23.72 KG/M2 | HEIGHT: 68 IN | HEART RATE: 106 BPM

## 2024-03-07 DIAGNOSIS — R11.0 NAUSEA: ICD-10-CM

## 2024-03-07 DIAGNOSIS — N76.0 BACTERIAL VAGINOSIS: ICD-10-CM

## 2024-03-07 DIAGNOSIS — O20.9 VAGINAL BLEEDING IN PREGNANCY, FIRST TRIMESTER: ICD-10-CM

## 2024-03-07 DIAGNOSIS — B96.89 BACTERIAL VAGINOSIS: ICD-10-CM

## 2024-03-07 LAB
ALBUMIN SERPL BCP-MCNC: 4.3 G/DL (ref 3.2–4.9)
ALBUMIN/GLOB SERPL: 1.4 G/DL
ALP SERPL-CCNC: 105 U/L (ref 30–99)
ALT SERPL-CCNC: 22 U/L (ref 2–50)
ANION GAP SERPL CALC-SCNC: 15 MMOL/L (ref 7–16)
APPEARANCE UR: CLEAR
AST SERPL-CCNC: 32 U/L (ref 12–45)
B-HCG SERPL-ACNC: 553 MIU/ML (ref 0–5)
BASOPHILS # BLD AUTO: 0.9 % (ref 0–1.8)
BASOPHILS # BLD: 0.06 K/UL (ref 0–0.12)
BILIRUB SERPL-MCNC: 0.3 MG/DL (ref 0.1–1.5)
BILIRUB UR QL STRIP.AUTO: ABNORMAL
BUN SERPL-MCNC: 8 MG/DL (ref 8–22)
C TRACH DNA GENITAL QL NAA+PROBE: NEGATIVE
CALCIUM ALBUM COR SERPL-MCNC: 8.8 MG/DL (ref 8.5–10.5)
CALCIUM SERPL-MCNC: 9 MG/DL (ref 8.5–10.5)
CANDIDA DNA VAG QL PROBE+SIG AMP: NEGATIVE
CHLORIDE SERPL-SCNC: 101 MMOL/L (ref 96–112)
CO2 SERPL-SCNC: 20 MMOL/L (ref 20–33)
COLOR UR: ABNORMAL
CREAT SERPL-MCNC: 0.6 MG/DL (ref 0.5–1.4)
EOSINOPHIL # BLD AUTO: 0 K/UL (ref 0–0.51)
EOSINOPHIL NFR BLD: 0 % (ref 0–6.9)
ERYTHROCYTE [DISTWIDTH] IN BLOOD BY AUTOMATED COUNT: 40.8 FL (ref 35.9–50)
G VAGINALIS DNA VAG QL PROBE+SIG AMP: POSITIVE
GFR SERPLBLD CREATININE-BSD FMLA CKD-EPI: 121 ML/MIN/1.73 M 2
GLOBULIN SER CALC-MCNC: 3.1 G/DL (ref 1.9–3.5)
GLUCOSE SERPL-MCNC: 132 MG/DL (ref 65–99)
GLUCOSE UR STRIP.AUTO-MCNC: ABNORMAL MG/DL
HCT VFR BLD AUTO: 37.2 % (ref 37–47)
HGB BLD-MCNC: 12.3 G/DL (ref 12–16)
KETONES UR STRIP.AUTO-MCNC: ABNORMAL MG/DL
LACTATE SERPL-SCNC: 0.9 MMOL/L (ref 0.5–2)
LEUKOCYTE ESTERASE UR QL STRIP.AUTO: ABNORMAL
LYMPHOCYTES # BLD AUTO: 0.28 K/UL (ref 1–4.8)
LYMPHOCYTES NFR BLD: 4.3 % (ref 22–41)
MANUAL DIFF BLD: NORMAL
MCH RBC QN AUTO: 30.2 PG (ref 27–33)
MCHC RBC AUTO-ENTMCNC: 33.1 G/DL (ref 32.2–35.5)
MCV RBC AUTO: 91.4 FL (ref 81.4–97.8)
METAMYELOCYTES NFR BLD MANUAL: 0.9 %
MICRO URNS: ABNORMAL
MICROCYTES BLD QL SMEAR: ABNORMAL
MONOCYTES # BLD AUTO: 0.17 K/UL (ref 0–0.85)
MONOCYTES NFR BLD AUTO: 2.6 % (ref 0–13.4)
MORPHOLOGY BLD-IMP: NORMAL
N GONORRHOEA DNA GENITAL QL NAA+PROBE: NEGATIVE
NEUTROPHILS # BLD AUTO: 6.03 K/UL (ref 1.82–7.42)
NEUTROPHILS NFR BLD: 82.7 % (ref 44–72)
NEUTS BAND NFR BLD MANUAL: 8.6 % (ref 0–10)
NITRITE UR QL STRIP.AUTO: ABNORMAL
NRBC # BLD AUTO: 0 K/UL
NRBC BLD-RTO: 0 /100 WBC (ref 0–0.2)
NUMBER OF RH DOSES IND 8505RD: NORMAL
PH UR STRIP.AUTO: ABNORMAL [PH] (ref 5–8)
PLATELET # BLD AUTO: 189 K/UL (ref 164–446)
PLATELET BLD QL SMEAR: NORMAL
PMV BLD AUTO: 10.9 FL (ref 9–12.9)
POTASSIUM SERPL-SCNC: 3.1 MMOL/L (ref 3.6–5.5)
PROT SERPL-MCNC: 7.4 G/DL (ref 6–8.2)
PROT UR QL STRIP: ABNORMAL MG/DL
RBC # BLD AUTO: 4.07 M/UL (ref 4.2–5.4)
RBC BLD AUTO: PRESENT
RBC UR QL AUTO: ABNORMAL
RH BLD: NORMAL
SODIUM SERPL-SCNC: 136 MMOL/L (ref 135–145)
SP GR UR STRIP.AUTO: ABNORMAL
SPECIMEN SOURCE: NORMAL
T VAGINALIS DNA VAG QL PROBE+SIG AMP: NEGATIVE
TSH SERPL DL<=0.005 MIU/L-ACNC: 1.15 UIU/ML (ref 0.38–5.33)
UROBILINOGEN UR STRIP.AUTO-MCNC: ABNORMAL MG/DL
WBC # BLD AUTO: 6.6 K/UL (ref 4.8–10.8)

## 2024-03-07 PROCEDURE — 84443 ASSAY THYROID STIM HORMONE: CPT

## 2024-03-07 PROCEDURE — 87086 URINE CULTURE/COLONY COUNT: CPT

## 2024-03-07 PROCEDURE — 96374 THER/PROPH/DIAG INJ IV PUSH: CPT

## 2024-03-07 PROCEDURE — 700102 HCHG RX REV CODE 250 W/ 637 OVERRIDE(OP): Performed by: EMERGENCY MEDICINE

## 2024-03-07 PROCEDURE — 87015 SPECIMEN INFECT AGNT CONCNTJ: CPT

## 2024-03-07 PROCEDURE — A9270 NON-COVERED ITEM OR SERVICE: HCPCS | Performed by: EMERGENCY MEDICINE

## 2024-03-07 PROCEDURE — 87491 CHLMYD TRACH DNA AMP PROBE: CPT

## 2024-03-07 PROCEDURE — 87591 N.GONORRHOEAE DNA AMP PROB: CPT

## 2024-03-07 PROCEDURE — 700111 HCHG RX REV CODE 636 W/ 250 OVERRIDE (IP): Mod: JZ | Performed by: EMERGENCY MEDICINE

## 2024-03-07 PROCEDURE — 87510 GARDNER VAG DNA DIR PROBE: CPT

## 2024-03-07 PROCEDURE — 85007 BL SMEAR W/DIFF WBC COUNT: CPT

## 2024-03-07 PROCEDURE — 76817 TRANSVAGINAL US OBSTETRIC: CPT

## 2024-03-07 PROCEDURE — 87660 TRICHOMONAS VAGIN DIR PROBE: CPT

## 2024-03-07 PROCEDURE — 74177 CT ABD & PELVIS W/CONTRAST: CPT

## 2024-03-07 PROCEDURE — 86901 BLOOD TYPING SEROLOGIC RH(D): CPT

## 2024-03-07 PROCEDURE — 81003 URINALYSIS AUTO W/O SCOPE: CPT

## 2024-03-07 PROCEDURE — 80053 COMPREHEN METABOLIC PANEL: CPT

## 2024-03-07 PROCEDURE — 84702 CHORIONIC GONADOTROPIN TEST: CPT

## 2024-03-07 PROCEDURE — 99285 EMERGENCY DEPT VISIT HI MDM: CPT

## 2024-03-07 PROCEDURE — 87077 CULTURE AEROBIC IDENTIFY: CPT | Mod: 91

## 2024-03-07 PROCEDURE — 700105 HCHG RX REV CODE 258: Performed by: EMERGENCY MEDICINE

## 2024-03-07 PROCEDURE — 36415 COLL VENOUS BLD VENIPUNCTURE: CPT

## 2024-03-07 PROCEDURE — 83605 ASSAY OF LACTIC ACID: CPT

## 2024-03-07 PROCEDURE — 87040 BLOOD CULTURE FOR BACTERIA: CPT | Mod: 91

## 2024-03-07 PROCEDURE — 700117 HCHG RX CONTRAST REV CODE 255: Performed by: EMERGENCY MEDICINE

## 2024-03-07 PROCEDURE — 85027 COMPLETE CBC AUTOMATED: CPT

## 2024-03-07 PROCEDURE — 87480 CANDIDA DNA DIR PROBE: CPT

## 2024-03-07 RX ORDER — SODIUM CHLORIDE 9 MG/ML
1000 INJECTION, SOLUTION INTRAVENOUS ONCE
Status: COMPLETED | OUTPATIENT
Start: 2024-03-07 | End: 2024-03-07

## 2024-03-07 RX ORDER — METRONIDAZOLE 500 MG/1
500 TABLET ORAL 2 TIMES DAILY
Qty: 14 TABLET | Refills: 0 | Status: ON HOLD | OUTPATIENT
Start: 2024-03-07 | End: 2024-03-11

## 2024-03-07 RX ORDER — ONDANSETRON 2 MG/ML
4 INJECTION INTRAMUSCULAR; INTRAVENOUS ONCE
Status: COMPLETED | OUTPATIENT
Start: 2024-03-07 | End: 2024-03-07

## 2024-03-07 RX ORDER — ACETAMINOPHEN 500 MG
1000 TABLET ORAL ONCE
Status: COMPLETED | OUTPATIENT
Start: 2024-03-07 | End: 2024-03-07

## 2024-03-07 RX ORDER — ONDANSETRON 4 MG/1
4 TABLET, ORALLY DISINTEGRATING ORAL EVERY 6 HOURS PRN
Qty: 10 TABLET | Refills: 0 | Status: SHIPPED | OUTPATIENT
Start: 2024-03-07

## 2024-03-07 RX ORDER — METRONIDAZOLE 500 MG/1
500 TABLET ORAL ONCE
Status: COMPLETED | OUTPATIENT
Start: 2024-03-07 | End: 2024-03-07

## 2024-03-07 RX ADMIN — ACETAMINOPHEN 1000 MG: 500 TABLET ORAL at 09:26

## 2024-03-07 RX ADMIN — IOHEXOL 100 ML: 350 INJECTION, SOLUTION INTRAVENOUS at 11:20

## 2024-03-07 RX ADMIN — SODIUM CHLORIDE 1000 ML: 9 INJECTION, SOLUTION INTRAVENOUS at 09:18

## 2024-03-07 RX ADMIN — ONDANSETRON 4 MG: 2 INJECTION INTRAMUSCULAR; INTRAVENOUS at 09:26

## 2024-03-07 RX ADMIN — METRONIDAZOLE 500 MG: 500 TABLET ORAL at 12:22

## 2024-03-07 ASSESSMENT — FIBROSIS 4 INDEX: FIB4 SCORE: 0.5

## 2024-03-07 ASSESSMENT — PAIN DESCRIPTION - PAIN TYPE: TYPE: ACUTE PAIN

## 2024-03-07 NOTE — ED PROVIDER NOTES
ED Provider Note    CHIEF COMPLAINT  Chief Complaint   Patient presents with    Vaginal Bleeding         HPI/ROS    Tiffanypramod Francois is a 33 y.o. female who presents for vaginal bleeding.  Patient states that she took a pregnancy test approximately 4 weeks ago.  A week after this patient had heavy bleeding and passage of tissue and clots, she suspected she had a miscarriage.  Following this patient had some spotting up until 3 days ago at which point vaginal bleeding worsened significantly.  She is going through around 8 pads and tampons per day.  She denies any use of anticoagulants or antiplatelets or bleeding problems in the past.  She has not noticed any passage of tissue.  She denies any vaginal discharge.  She reports some mild suprapubic pain and some left flank pain.  She denies any dysuria urgency or frequency.  Patient states she is concerned that she is also been having some mild chills.  Patient has had 2 episodes of nonbloody nonbilious emesis and remains mildly nauseous.    PAST MEDICAL HISTORY   has a past medical history of Hypertension and Migraine.    SURGICAL HISTORY   has a past surgical history that includes appendectomy.    FAMILY HISTORY  Family History   Problem Relation Age of Onset    No Known Problems Mother     No Known Problems Father     Hypertension Maternal Aunt     Diabetes Maternal Uncle     Hypertension Maternal Uncle     Breast Cancer Paternal Grandmother     Cancer Paternal Grandmother         breast    Diabetes Paternal Grandfather     Heart Disease Paternal Grandfather     Psychiatric Illness Neg Hx     Colorectal Cancer Neg Hx     Tubal Cancer Neg Hx     Peritoneal Cancer Neg Hx     Ovarian Cancer Neg Hx     Hyperlipidemia Neg Hx     Stroke Neg Hx        SOCIAL HISTORY  Social History     Tobacco Use    Smoking status: Never    Smokeless tobacco: Never   Vaping Use    Vaping Use: Never used   Substance and Sexual Activity    Alcohol use: Yes     Alcohol/week: 1.2 - 1.8 oz  "    Types: 2 - 3 Cans of beer per week     Comment: occassional - twice monthly at most    Drug use: No    Sexual activity: Yes     Partners: Male     Birth control/protection: Pill       CURRENT MEDICATIONS  Home Medications       Reviewed by Mee Jo R.N. (Registered Nurse) on 03/07/24 at 0716  Med List Status: Partial     Medication Last Dose Status   lisinopril-hydrochlorothiazide (PRINZIDE) 10-12.5 MG per tablet 3/6/2024 Active   norethindrone (HELEN) 0.35 MG tablet 3/6/2024 Active   UBRELVY 50 MG Tab 3/6/2024 Active                    ALLERGIES  No Known Allergies    PHYSICAL EXAM  VITAL SIGNS: BP (!) 156/91   Pulse (!) 139   Temp 36.3 °C (97.4 °F) (Temporal)   Resp 20   Ht 1.727 m (5' 8\")   Wt 71 kg (156 lb 8.4 oz)   LMP 12/13/2023 (Approximate)   SpO2 95%   BMI 23.80 kg/m²    Physical Exam  Constitutional:       Appearance: She is well-developed.      Comments: clammy   HENT:      Head: Normocephalic and atraumatic.   Eyes:      Pupils: Pupils are equal, round, and reactive to light.   Cardiovascular:      Rate and Rhythm: Regular rhythm. Tachycardia present.   Pulmonary:      Effort: Pulmonary effort is normal. No accessory muscle usage or respiratory distress.      Breath sounds: Normal breath sounds.   Abdominal:      General: Bowel sounds are normal.      Palpations: Abdomen is soft. There is no mass.      Tenderness: There is no abdominal tenderness.   Musculoskeletal:         General: Normal range of motion.   Skin:     Capillary Refill: Capillary refill takes less than 2 seconds.   Neurological:      General: No focal deficit present.      Mental Status: She is alert.   Psychiatric:         Mood and Affect: Mood normal. Mood is not anxious.           DIAGNOSTIC STUDIES / PROCEDURES      LABS  Results for orders placed or performed during the hospital encounter of 03/07/24   CBC WITH DIFFERENTIAL   Result Value Ref Range    WBC 6.6 4.8 - 10.8 K/uL    RBC 4.07 (L) 4.20 - 5.40 M/uL    " Hemoglobin 12.3 12.0 - 16.0 g/dL    Hematocrit 37.2 37.0 - 47.0 %    MCV 91.4 81.4 - 97.8 fL    MCH 30.2 27.0 - 33.0 pg    MCHC 33.1 32.2 - 35.5 g/dL    RDW 40.8 35.9 - 50.0 fL    Platelet Count 189 164 - 446 K/uL    MPV 10.9 9.0 - 12.9 fL    Neutrophils-Polys 82.70 (H) 44.00 - 72.00 %    Lymphocytes 4.30 (L) 22.00 - 41.00 %    Monocytes 2.60 0.00 - 13.40 %    Eosinophils 0.00 0.00 - 6.90 %    Basophils 0.90 0.00 - 1.80 %    Nucleated RBC 0.00 0.00 - 0.20 /100 WBC    Neutrophils (Absolute) 6.03 1.82 - 7.42 K/uL    Lymphs (Absolute) 0.28 (L) 1.00 - 4.80 K/uL    Monos (Absolute) 0.17 0.00 - 0.85 K/uL    Eos (Absolute) 0.00 0.00 - 0.51 K/uL    Baso (Absolute) 0.06 0.00 - 0.12 K/uL    NRBC (Absolute) 0.00 K/uL    Microcytosis 1+    COMP METABOLIC PANEL   Result Value Ref Range    Sodium 136 135 - 145 mmol/L    Potassium 3.1 (L) 3.6 - 5.5 mmol/L    Chloride 101 96 - 112 mmol/L    Co2 20 20 - 33 mmol/L    Anion Gap 15.0 7.0 - 16.0    Glucose 132 (H) 65 - 99 mg/dL    Bun 8 8 - 22 mg/dL    Creatinine 0.60 0.50 - 1.40 mg/dL    Calcium 9.0 8.5 - 10.5 mg/dL    Correct Calcium 8.8 8.5 - 10.5 mg/dL    AST(SGOT) 32 12 - 45 U/L    ALT(SGPT) 22 2 - 50 U/L    Alkaline Phosphatase 105 (H) 30 - 99 U/L    Total Bilirubin 0.3 0.1 - 1.5 mg/dL    Albumin 4.3 3.2 - 4.9 g/dL    Total Protein 7.4 6.0 - 8.2 g/dL    Globulin 3.1 1.9 - 3.5 g/dL    A-G Ratio 1.4 g/dL   RH TYPE FOR RHOGAM FROM E.D.   Result Value Ref Range    Emergency Department Rh Typing POS     Number Of Rh Doses Indicated ZERO    HCG QUANTITATIVE   Result Value Ref Range    Bhcg 553.0 (H) 0.0 - 5.0 mIU/mL   Lactic Acid   Result Value Ref Range    Lactic Acid 0.9 0.5 - 2.0 mmol/L   Chlamydia/GC, PCR (Genital/Anal swab)    Specimen: Genital   Result Value Ref Range    Source Vaginal    ESTIMATED GFR   Result Value Ref Range    GFR (CKD-EPI) 121 >60 mL/min/1.73 m 2   DIFFERENTIAL MANUAL   Result Value Ref Range    Bands-Stabs 8.60 0.00 - 10.00 %    Metamyelocytes 0.90 %     Manual Diff Status PERFORMED    PERIPHERAL SMEAR REVIEW   Result Value Ref Range    Peripheral Smear Review see below    PLATELET ESTIMATE   Result Value Ref Range    Plt Estimation Normal    MORPHOLOGY   Result Value Ref Range    RBC Morphology Present    URINALYSIS   Result Value Ref Range    Color Red (A)     Character Clear     Specific Gravity see comment <1.035    Ph see comment 5.0 - 8.0    Glucose see comment Negative mg/dL    Ketones see comment Negative mg/dL    Protein see comment Negative mg/dL    Bilirubin see comment Negative    Urobilinogen, Urine see comment Negative    Nitrite see comment Negative    Leukocyte Esterase see comment Negative    Occult Blood see comment Negative    Micro Urine Req Microscopic    TSH WITH REFLEX TO FT4   Result Value Ref Range    TSH 1.150 0.380 - 5.330 uIU/mL   VAGINAL PATHOGENS DNA PANEL   Result Value Ref Range    Candida species DNA Probe Negative Negative    Trichamonas vaginalis DNA Probe Negative Negative    Gardnerella vaginalis DNA Probe POSITIVE (A) Negative         RADIOLOGY  I have independently interpreted the diagnostic imaging associated with this visit and am waiting the final reading from the radiologist.   My preliminary interpretation is as follows: No evidence of surgical process.  No significant fat stranding.  No venous thrombosis.  Radiologist interpretation:   CT-ABDOMEN-PELVIS WITH   Final Result      1.  There is hyperdense material in the central endometrial region of the uterus which could be blood products or retained products of conception based on the clinical history.   2.  No adnexal mass.   3.  No venous thrombosis is identified.      US-OB TRANSVAGINAL ONLY   Final Result      1.  No evidence of intrauterine pregnancy.   2.  Complex material within the lower uterine segment and cervix could represent hemorrhage and/or retained products of conception.   3.  Right ovary is not visualized.            COURSE & MEDICAL DECISION  MAKING    INITIAL ASSESSMENT, COURSE AND PLAN  Care Narrative: Patient here complaining of subjective fevers and chills, afebrile here but significantly tachycardic.  My leading concern is possible missed  with retained products of conception.  Endometritis will be less likely however given patient does not have a fever and does not have any associated significant rebound or guarding on exam.  Awaiting female chaperone to complete pelvic exam though my suspicion of pelvic infection remains relatively low.  Patient certainly could have a ectopic pregnancy, beta-hCG sent.  Patient will need pelvic ultrasound regardless of pregnancy status given my above concerns.  3 miscarriages also possible.  Atypical pyelonephritis is also possible.  Checking urinalysis.  Given patient's complaint of fever will add blood cultures and lactate.  Patient given Zofran for nausea.  Her pain is very minimal and she is currently not requiring anything for pain.  Severe anemia, versus dehydration may be the cause of patient's significant tachycardia.,  IV fluids, awaiting CBC.  Patient's beta quant is mildly elevated, believe this is more reflective of missed  especially given patient's ultrasound findings most consistent with this as well, rather than ectopic pregnancy.  Patient continues to have shaking chills, CBC is reassuring, patient remains without any associated suprapubic tenderness.  Patient's urinalysis does not appear consistent with infection, minimal white cells.  Patient received Tylenol without any improvement.  Patient's TSH was also checked to ensure this was not developing thyrotoxicosis.  TSH is normal.  I have discussed the case with Dr. Childs who is patient's outpatient gynecologist, he recommends checking CT to evaluate for possible pelvic thrombosis.  CT ordered.  Patient CT again reveals some questionable POC.  This is most likely a missed , obviously this is why I was comfortable checking a  CT, however extremely in early pregnancy is possible here.  I believe that without trended hCGs giving an abortant would be contraindicated at this point especially in this patient who is questioning whether she would keep her pregnancy.  Patient heart rate has returned to near normal, wayne of around 102.  She is feeling significantly improved.  I Have palpated her abdomen again, she remains without any associated tenderness, my suspicion of endometritis remains low here.  I have discussed with patient that I like her to return in 48 hours for repeat beta-hCG.  Consider mifepristone at that time if hCG is following.  I have also stressed the importance of patient returning immediately if symptoms worsen.  Patient does have bacterial vaginosis and I treated her with Flagyl.        DISPOSITION AND DISCUSSIONS  I have discussed management of the patient with the following physicians and JORGE's:  Dr. Soto of gynecology    FINAL DIAGNOSIS  1. Vaginal bleeding in pregnancy, first trimester    2. Bacterial vaginosis    3. Nausea

## 2024-03-07 NOTE — LETTER
3/8/2024               Tiffany Francois  [address], [address]  [place] [address]        Dear Tiffany (MR#[id number])    As we have been unable to contact you by phone, this letter is sent in regards to your recent visit to the [facility] Emergency Department on 3/7/2024. During the visit, tests were performed to assist the physician in a medical diagnosis. A review of those tests requires that we notify you of the following:    Your blood culture and sensitivity was POSITIVE for a bacteria called Gram-positive cocci, and further treatment may be necessary. The currently prescribed antibiotic (metronidazole) may not be effective in treating your infection. IF YOU ARE NOT FEELING BETTER PLEASE CONTACT ME AS SOON AS POSSIBLE AT THE NUMBER BELOW.       Thank you for your cooperation in the matter.    Sincerely,  ED Culture Follow-Up Staff  Abrahan Montiel, PharmD    [facility], Emergency Department  [address] [place] [id number]  [phone] (ED Culture Line)

## 2024-03-07 NOTE — DISCHARGE INSTRUCTIONS
You most likely have OB: Missed miscarriage where not all of the products of conception were expelled.  It is also possible that this is simply very early pregnancy.  Is hard to know with certainty, and therefore in order to determine this we generally check a repeat lab test called a beta-hCG in 48 hours.  If this is going up or down it helps us determine if this is an ongoing pregnancy or a terminated pregnancy.  I would like you to monitor your temperature closely, if you have a temperature greater than 100.4 and needed to return to the emergency department.  If you develop severe pain, or have any other concerns return to the emergency department.  Your labs were very reassuring, your vaginal wet prep returned positive for something called bacterial vaginosis, this is a very easily treatable infection that is not related to sexually transmitted diseases.

## 2024-03-07 NOTE — ED NOTES
US complete, pt started shaking half way through the US, rechecked an oral temp and was 97.5. ERP aware.

## 2024-03-07 NOTE — ED TRIAGE NOTES
Pt ambulatory to triage c/o positive pregnancy test and vaginal bleeding that began 3 weeks ago but has worsened over last 3 days. Pt states going through approx 1 pad an hour. Pt states also now having n/v chills. nad

## 2024-03-08 ENCOUNTER — APPOINTMENT (OUTPATIENT)
Dept: RADIOLOGY | Facility: MEDICAL CENTER | Age: 34
DRG: 770 | End: 2024-03-08
Attending: HOSPITALIST
Payer: COMMERCIAL

## 2024-03-08 ENCOUNTER — HOSPITAL ENCOUNTER (INPATIENT)
Facility: MEDICAL CENTER | Age: 34
LOS: 3 days | DRG: 770 | End: 2024-03-11
Attending: EMERGENCY MEDICINE | Admitting: HOSPITALIST
Payer: COMMERCIAL

## 2024-03-08 ENCOUNTER — APPOINTMENT (OUTPATIENT)
Dept: RADIOLOGY | Facility: MEDICAL CENTER | Age: 34
DRG: 770 | End: 2024-03-08
Attending: EMERGENCY MEDICINE
Payer: COMMERCIAL

## 2024-03-08 DIAGNOSIS — R78.81 BACTEREMIA: ICD-10-CM

## 2024-03-08 PROBLEM — O03.9 MISCARRIAGE: Status: ACTIVE | Noted: 2024-03-08

## 2024-03-08 LAB
ALBUMIN SERPL BCP-MCNC: 4.1 G/DL (ref 3.2–4.9)
ALBUMIN/GLOB SERPL: 1.2 G/DL
ALP SERPL-CCNC: 102 U/L (ref 30–99)
ALT SERPL-CCNC: 21 U/L (ref 2–50)
ANION GAP SERPL CALC-SCNC: 16 MMOL/L (ref 7–16)
APPEARANCE UR: CLEAR
AST SERPL-CCNC: 28 U/L (ref 12–45)
B-HCG SERPL-ACNC: 299 MIU/ML (ref 0–5)
BACTERIA #/AREA URNS HPF: ABNORMAL /HPF
BASOPHILS # BLD AUTO: 0.6 % (ref 0–1.8)
BASOPHILS # BLD: 0.03 K/UL (ref 0–0.12)
BILIRUB SERPL-MCNC: 0.3 MG/DL (ref 0.1–1.5)
BILIRUB UR QL STRIP.AUTO: NEGATIVE
BUN SERPL-MCNC: 12 MG/DL (ref 8–22)
CALCIUM ALBUM COR SERPL-MCNC: 9 MG/DL (ref 8.5–10.5)
CALCIUM SERPL-MCNC: 9.1 MG/DL (ref 8.5–10.5)
CHLORIDE SERPL-SCNC: 98 MMOL/L (ref 96–112)
CO2 SERPL-SCNC: 23 MMOL/L (ref 20–33)
COLOR UR: YELLOW
CREAT SERPL-MCNC: 0.81 MG/DL (ref 0.5–1.4)
CRP SERPL HS-MCNC: 15.63 MG/DL (ref 0–0.75)
EOSINOPHIL # BLD AUTO: 0.04 K/UL (ref 0–0.51)
EOSINOPHIL NFR BLD: 0.8 % (ref 0–6.9)
EPI CELLS #/AREA URNS HPF: ABNORMAL /HPF
ERYTHROCYTE [DISTWIDTH] IN BLOOD BY AUTOMATED COUNT: 39.6 FL (ref 35.9–50)
GFR SERPLBLD CREATININE-BSD FMLA CKD-EPI: 97 ML/MIN/1.73 M 2
GLOBULIN SER CALC-MCNC: 3.5 G/DL (ref 1.9–3.5)
GLUCOSE SERPL-MCNC: 122 MG/DL (ref 65–99)
GLUCOSE UR STRIP.AUTO-MCNC: NEGATIVE MG/DL
HCT VFR BLD AUTO: 33.6 % (ref 37–47)
HGB BLD-MCNC: 11.7 G/DL (ref 12–16)
HIV 1+2 AB+HIV1 P24 AG SERPL QL IA: NORMAL
HYALINE CASTS #/AREA URNS LPF: ABNORMAL /LPF
IMM GRANULOCYTES # BLD AUTO: 0.01 K/UL (ref 0–0.11)
IMM GRANULOCYTES NFR BLD AUTO: 0.2 % (ref 0–0.9)
KETONES UR STRIP.AUTO-MCNC: NEGATIVE MG/DL
LACTATE SERPL-SCNC: 1.1 MMOL/L (ref 0.5–2)
LEUKOCYTE ESTERASE UR QL STRIP.AUTO: ABNORMAL
LYMPHOCYTES # BLD AUTO: 1.17 K/UL (ref 1–4.8)
LYMPHOCYTES NFR BLD: 23.2 % (ref 22–41)
MCH RBC QN AUTO: 30.7 PG (ref 27–33)
MCHC RBC AUTO-ENTMCNC: 34.8 G/DL (ref 32.2–35.5)
MCV RBC AUTO: 88.2 FL (ref 81.4–97.8)
MICRO URNS: ABNORMAL
MONOCYTES # BLD AUTO: 0.52 K/UL (ref 0–0.85)
MONOCYTES NFR BLD AUTO: 10.3 % (ref 0–13.4)
NEUTROPHILS # BLD AUTO: 3.28 K/UL (ref 1.82–7.42)
NEUTROPHILS NFR BLD: 64.9 % (ref 44–72)
NITRITE UR QL STRIP.AUTO: NEGATIVE
NRBC # BLD AUTO: 0 K/UL
NRBC BLD-RTO: 0 /100 WBC (ref 0–0.2)
PH UR STRIP.AUTO: 6.5 [PH] (ref 5–8)
PLATELET # BLD AUTO: 155 K/UL (ref 164–446)
PMV BLD AUTO: 11.4 FL (ref 9–12.9)
POTASSIUM SERPL-SCNC: 3.1 MMOL/L (ref 3.6–5.5)
PROT SERPL-MCNC: 7.6 G/DL (ref 6–8.2)
PROT UR QL STRIP: NEGATIVE MG/DL
RBC # BLD AUTO: 3.81 M/UL (ref 4.2–5.4)
RBC # URNS HPF: ABNORMAL /HPF
RBC UR QL AUTO: ABNORMAL
SCCMEC + MECA PNL NOSE NAA+PROBE: NEGATIVE
SODIUM SERPL-SCNC: 137 MMOL/L (ref 135–145)
SP GR UR STRIP.AUTO: 1.01
UROBILINOGEN UR STRIP.AUTO-MCNC: 1 MG/DL
WBC # BLD AUTO: 5.1 K/UL (ref 4.8–10.8)
WBC #/AREA URNS HPF: ABNORMAL /HPF

## 2024-03-08 PROCEDURE — A9270 NON-COVERED ITEM OR SERVICE: HCPCS | Mod: JZ | Performed by: HOSPITALIST

## 2024-03-08 PROCEDURE — 700102 HCHG RX REV CODE 250 W/ 637 OVERRIDE(OP): Mod: JZ | Performed by: HOSPITALIST

## 2024-03-08 PROCEDURE — 83036 HEMOGLOBIN GLYCOSYLATED A1C: CPT

## 2024-03-08 PROCEDURE — 96366 THER/PROPH/DIAG IV INF ADDON: CPT

## 2024-03-08 PROCEDURE — 99223 1ST HOSP IP/OBS HIGH 75: CPT | Performed by: HOSPITALIST

## 2024-03-08 PROCEDURE — 81001 URINALYSIS AUTO W/SCOPE: CPT

## 2024-03-08 PROCEDURE — 770006 HCHG ROOM/CARE - MED/SURG/GYN SEMI*

## 2024-03-08 PROCEDURE — 80053 COMPREHEN METABOLIC PANEL: CPT

## 2024-03-08 PROCEDURE — 84702 CHORIONIC GONADOTROPIN TEST: CPT

## 2024-03-08 PROCEDURE — 83605 ASSAY OF LACTIC ACID: CPT

## 2024-03-08 PROCEDURE — 85652 RBC SED RATE AUTOMATED: CPT

## 2024-03-08 PROCEDURE — 87040 BLOOD CULTURE FOR BACTERIA: CPT

## 2024-03-08 PROCEDURE — 700105 HCHG RX REV CODE 258: Performed by: EMERGENCY MEDICINE

## 2024-03-08 PROCEDURE — 700111 HCHG RX REV CODE 636 W/ 250 OVERRIDE (IP): Performed by: EMERGENCY MEDICINE

## 2024-03-08 PROCEDURE — 87076 CULTURE ANAEROBE IDENT EACH: CPT

## 2024-03-08 PROCEDURE — 85025 COMPLETE CBC W/AUTO DIFF WBC: CPT

## 2024-03-08 PROCEDURE — 87015 SPECIMEN INFECT AGNT CONCNTJ: CPT

## 2024-03-08 PROCEDURE — 96365 THER/PROPH/DIAG IV INF INIT: CPT

## 2024-03-08 PROCEDURE — 87086 URINE CULTURE/COLONY COUNT: CPT

## 2024-03-08 PROCEDURE — 36415 COLL VENOUS BLD VENIPUNCTURE: CPT

## 2024-03-08 PROCEDURE — 99285 EMERGENCY DEPT VISIT HI MDM: CPT

## 2024-03-08 PROCEDURE — 87077 CULTURE AEROBIC IDENTIFY: CPT

## 2024-03-08 PROCEDURE — 86140 C-REACTIVE PROTEIN: CPT

## 2024-03-08 PROCEDURE — 71045 X-RAY EXAM CHEST 1 VIEW: CPT

## 2024-03-08 PROCEDURE — 87389 HIV-1 AG W/HIV-1&-2 AB AG IA: CPT

## 2024-03-08 PROCEDURE — 87641 MR-STAPH DNA AMP PROBE: CPT

## 2024-03-08 RX ORDER — PROCHLORPERAZINE EDISYLATE 5 MG/ML
5-10 INJECTION INTRAMUSCULAR; INTRAVENOUS EVERY 4 HOURS PRN
Status: DISCONTINUED | OUTPATIENT
Start: 2024-03-08 | End: 2024-03-11 | Stop reason: HOSPADM

## 2024-03-08 RX ORDER — ONDANSETRON 2 MG/ML
4 INJECTION INTRAMUSCULAR; INTRAVENOUS EVERY 4 HOURS PRN
Status: DISCONTINUED | OUTPATIENT
Start: 2024-03-08 | End: 2024-03-11 | Stop reason: HOSPADM

## 2024-03-08 RX ORDER — ONDANSETRON 4 MG/1
4 TABLET, ORALLY DISINTEGRATING ORAL EVERY 4 HOURS PRN
Status: DISCONTINUED | OUTPATIENT
Start: 2024-03-08 | End: 2024-03-11 | Stop reason: HOSPADM

## 2024-03-08 RX ORDER — POTASSIUM CHLORIDE 20 MEQ/1
40 TABLET, EXTENDED RELEASE ORAL ONCE
Status: COMPLETED | OUTPATIENT
Start: 2024-03-08 | End: 2024-03-08

## 2024-03-08 RX ORDER — PROMETHAZINE HYDROCHLORIDE 25 MG/1
12.5-25 SUPPOSITORY RECTAL EVERY 4 HOURS PRN
Status: DISCONTINUED | OUTPATIENT
Start: 2024-03-08 | End: 2024-03-11 | Stop reason: HOSPADM

## 2024-03-08 RX ORDER — METRONIDAZOLE 500 MG/100ML
500 INJECTION, SOLUTION INTRAVENOUS EVERY 12 HOURS
Status: DISCONTINUED | OUTPATIENT
Start: 2024-03-08 | End: 2024-03-09

## 2024-03-08 RX ORDER — PROMETHAZINE HYDROCHLORIDE 25 MG/1
12.5-25 TABLET ORAL EVERY 4 HOURS PRN
Status: DISCONTINUED | OUTPATIENT
Start: 2024-03-08 | End: 2024-03-11 | Stop reason: HOSPADM

## 2024-03-08 RX ORDER — ACETAMINOPHEN 325 MG/1
650 TABLET ORAL EVERY 6 HOURS PRN
Status: DISCONTINUED | OUTPATIENT
Start: 2024-03-08 | End: 2024-03-11

## 2024-03-08 RX ADMIN — VANCOMYCIN HYDROCHLORIDE 1750 MG: 5 INJECTION, POWDER, LYOPHILIZED, FOR SOLUTION INTRAVENOUS at 21:45

## 2024-03-08 RX ADMIN — POTASSIUM CHLORIDE 40 MEQ: 1500 TABLET, EXTENDED RELEASE ORAL at 22:41

## 2024-03-08 ASSESSMENT — ENCOUNTER SYMPTOMS
POLYDIPSIA: 0
TREMORS: 0
CHILLS: 1
BRUISES/BLEEDS EASILY: 0
HEARTBURN: 0
HEADACHES: 0
PND: 0
EYE PAIN: 0
HEMOPTYSIS: 0
ORTHOPNEA: 0
FLANK PAIN: 0
SHORTNESS OF BREATH: 0
STRIDOR: 0
COUGH: 0
HALLUCINATIONS: 0
PHOTOPHOBIA: 0
CONSTIPATION: 0
CLAUDICATION: 0
BACK PAIN: 0
TINGLING: 0
BLURRED VISION: 0
PALPITATIONS: 0
DIARRHEA: 0
ABDOMINAL PAIN: 0
DIZZINESS: 0
NECK PAIN: 0
BLOOD IN STOOL: 0
FALLS: 0
VOMITING: 0
DOUBLE VISION: 0
WHEEZING: 0
WEAKNESS: 0
DEPRESSION: 0
MYALGIAS: 0
FEVER: 1
NAUSEA: 0
DIAPHORESIS: 0
SORE THROAT: 0
SINUS PAIN: 0
SPUTUM PRODUCTION: 0

## 2024-03-08 ASSESSMENT — FIBROSIS 4 INDEX
FIB4 SCORE: 1.23
FIB4 SCORE: 1.34

## 2024-03-08 ASSESSMENT — LIFESTYLE VARIABLES: SUBSTANCE_ABUSE: 0

## 2024-03-09 PROBLEM — E87.6 HYPOKALEMIA: Status: ACTIVE | Noted: 2024-03-09

## 2024-03-09 PROBLEM — D64.9 ANEMIA: Status: ACTIVE | Noted: 2024-03-09

## 2024-03-09 LAB
ALBUMIN SERPL BCP-MCNC: 3.6 G/DL (ref 3.2–4.9)
ALBUMIN/GLOB SERPL: 1.2 G/DL
ALP SERPL-CCNC: 83 U/L (ref 30–99)
ALT SERPL-CCNC: 15 U/L (ref 2–50)
ANION GAP SERPL CALC-SCNC: 13 MMOL/L (ref 7–16)
AST SERPL-CCNC: 23 U/L (ref 12–45)
BACTERIA UR CULT: NORMAL
BASOPHILS # BLD AUTO: 0.3 % (ref 0–1.8)
BASOPHILS # BLD: 0.02 K/UL (ref 0–0.12)
BILIRUB SERPL-MCNC: 0.2 MG/DL (ref 0.1–1.5)
BUN SERPL-MCNC: 9 MG/DL (ref 8–22)
CALCIUM ALBUM COR SERPL-MCNC: 8.9 MG/DL (ref 8.5–10.5)
CALCIUM SERPL-MCNC: 8.6 MG/DL (ref 8.5–10.5)
CHLORIDE SERPL-SCNC: 102 MMOL/L (ref 96–112)
CO2 SERPL-SCNC: 23 MMOL/L (ref 20–33)
CREAT SERPL-MCNC: 0.56 MG/DL (ref 0.5–1.4)
EOSINOPHIL # BLD AUTO: 0.04 K/UL (ref 0–0.51)
EOSINOPHIL NFR BLD: 0.6 % (ref 0–6.9)
ERYTHROCYTE [DISTWIDTH] IN BLOOD BY AUTOMATED COUNT: 39.2 FL (ref 35.9–50)
ERYTHROCYTE [SEDIMENTATION RATE] IN BLOOD BY WESTERGREN METHOD: 52 MM/HOUR (ref 0–25)
EST. AVERAGE GLUCOSE BLD GHB EST-MCNC: 85 MG/DL
FERRITIN SERPL-MCNC: 247 NG/ML (ref 10–291)
GFR SERPLBLD CREATININE-BSD FMLA CKD-EPI: 123 ML/MIN/1.73 M 2
GLOBULIN SER CALC-MCNC: 3 G/DL (ref 1.9–3.5)
GLUCOSE SERPL-MCNC: 93 MG/DL (ref 65–99)
HBA1C MFR BLD: 4.6 % (ref 4–5.6)
HCT VFR BLD AUTO: 31.7 % (ref 37–47)
HGB BLD-MCNC: 11 G/DL (ref 12–16)
IMM GRANULOCYTES # BLD AUTO: 0.02 K/UL (ref 0–0.11)
IMM GRANULOCYTES NFR BLD AUTO: 0.3 % (ref 0–0.9)
IRON SATN MFR SERPL: 15 % (ref 15–55)
IRON SERPL-MCNC: 41 UG/DL (ref 40–170)
LYMPHOCYTES # BLD AUTO: 1.3 K/UL (ref 1–4.8)
LYMPHOCYTES NFR BLD: 19.1 % (ref 22–41)
MCH RBC QN AUTO: 30.7 PG (ref 27–33)
MCHC RBC AUTO-ENTMCNC: 34.7 G/DL (ref 32.2–35.5)
MCV RBC AUTO: 88.5 FL (ref 81.4–97.8)
MONOCYTES # BLD AUTO: 0.62 K/UL (ref 0–0.85)
MONOCYTES NFR BLD AUTO: 9.1 % (ref 0–13.4)
NEUTROPHILS # BLD AUTO: 4.8 K/UL (ref 1.82–7.42)
NEUTROPHILS NFR BLD: 70.6 % (ref 44–72)
NRBC # BLD AUTO: 0 K/UL
NRBC BLD-RTO: 0 /100 WBC (ref 0–0.2)
PLATELET # BLD AUTO: 138 K/UL (ref 164–446)
PMV BLD AUTO: 11.2 FL (ref 9–12.9)
POTASSIUM SERPL-SCNC: 3.4 MMOL/L (ref 3.6–5.5)
PROT SERPL-MCNC: 6.6 G/DL (ref 6–8.2)
RBC # BLD AUTO: 3.58 M/UL (ref 4.2–5.4)
SIGNIFICANT IND 70042: NORMAL
SITE SITE: NORMAL
SODIUM SERPL-SCNC: 138 MMOL/L (ref 135–145)
SOURCE SOURCE: NORMAL
TIBC SERPL-MCNC: 279 UG/DL (ref 250–450)
UIBC SERPL-MCNC: 238 UG/DL (ref 110–370)
WBC # BLD AUTO: 6.8 K/UL (ref 4.8–10.8)

## 2024-03-09 PROCEDURE — A9270 NON-COVERED ITEM OR SERVICE: HCPCS | Mod: JZ | Performed by: INTERNAL MEDICINE

## 2024-03-09 PROCEDURE — 36415 COLL VENOUS BLD VENIPUNCTURE: CPT

## 2024-03-09 PROCEDURE — 83550 IRON BINDING TEST: CPT

## 2024-03-09 PROCEDURE — 83540 ASSAY OF IRON: CPT

## 2024-03-09 PROCEDURE — A9270 NON-COVERED ITEM OR SERVICE: HCPCS | Performed by: HOSPITALIST

## 2024-03-09 PROCEDURE — 85025 COMPLETE CBC W/AUTO DIFF WBC: CPT

## 2024-03-09 PROCEDURE — 99222 1ST HOSP IP/OBS MODERATE 55: CPT | Performed by: INTERNAL MEDICINE

## 2024-03-09 PROCEDURE — 770006 HCHG ROOM/CARE - MED/SURG/GYN SEMI*

## 2024-03-09 PROCEDURE — 700105 HCHG RX REV CODE 258: Performed by: HOSPITALIST

## 2024-03-09 PROCEDURE — 700102 HCHG RX REV CODE 250 W/ 637 OVERRIDE(OP): Mod: JZ | Performed by: INTERNAL MEDICINE

## 2024-03-09 PROCEDURE — 99232 SBSQ HOSP IP/OBS MODERATE 35: CPT | Performed by: INTERNAL MEDICINE

## 2024-03-09 PROCEDURE — 700111 HCHG RX REV CODE 636 W/ 250 OVERRIDE (IP): Mod: JZ | Performed by: HOSPITALIST

## 2024-03-09 PROCEDURE — 700102 HCHG RX REV CODE 250 W/ 637 OVERRIDE(OP): Performed by: HOSPITALIST

## 2024-03-09 PROCEDURE — 80053 COMPREHEN METABOLIC PANEL: CPT

## 2024-03-09 PROCEDURE — 82728 ASSAY OF FERRITIN: CPT

## 2024-03-09 RX ORDER — NAPHAZOLINE HCL 0.012 %
2 DROPS OPHTHALMIC (EYE) EVERY 6 HOURS PRN
COMMUNITY

## 2024-03-09 RX ORDER — POTASSIUM CHLORIDE 20 MEQ/1
20 TABLET, EXTENDED RELEASE ORAL 2 TIMES DAILY
Status: COMPLETED | OUTPATIENT
Start: 2024-03-09 | End: 2024-03-10

## 2024-03-09 RX ORDER — METRONIDAZOLE 500 MG/1
500 TABLET ORAL EVERY 12 HOURS
Status: DISCONTINUED | OUTPATIENT
Start: 2024-03-10 | End: 2024-03-11 | Stop reason: HOSPADM

## 2024-03-09 RX ORDER — IBUPROFEN 200 MG
600 TABLET ORAL EVERY 6 HOURS PRN
COMMUNITY

## 2024-03-09 RX ORDER — MISOPROSTOL 200 UG/1
800 TABLET ORAL ONCE
Status: COMPLETED | OUTPATIENT
Start: 2024-03-09 | End: 2024-03-09

## 2024-03-09 RX ADMIN — VANCOMYCIN HYDROCHLORIDE 1000 MG: 5 INJECTION, POWDER, LYOPHILIZED, FOR SOLUTION INTRAVENOUS at 11:56

## 2024-03-09 RX ADMIN — METRONIDAZOLE 500 MG: 500 INJECTION, SOLUTION INTRAVENOUS at 02:31

## 2024-03-09 RX ADMIN — ACETAMINOPHEN 650 MG: 325 TABLET, FILM COATED ORAL at 19:41

## 2024-03-09 RX ADMIN — METRONIDAZOLE 500 MG: 500 INJECTION, SOLUTION INTRAVENOUS at 16:22

## 2024-03-09 RX ADMIN — ACETAMINOPHEN 650 MG: 325 TABLET, FILM COATED ORAL at 10:35

## 2024-03-09 RX ADMIN — MISOPROSTOL 800 MCG: 200 TABLET ORAL at 15:06

## 2024-03-09 RX ADMIN — VANCOMYCIN HYDROCHLORIDE 1000 MG: 5 INJECTION, POWDER, LYOPHILIZED, FOR SOLUTION INTRAVENOUS at 21:52

## 2024-03-09 RX ADMIN — POTASSIUM CHLORIDE 20 MEQ: 1500 TABLET, EXTENDED RELEASE ORAL at 11:56

## 2024-03-09 RX ADMIN — POTASSIUM CHLORIDE 20 MEQ: 1500 TABLET, EXTENDED RELEASE ORAL at 18:05

## 2024-03-09 ASSESSMENT — COGNITIVE AND FUNCTIONAL STATUS - GENERAL
SUGGESTED CMS G CODE MODIFIER DAILY ACTIVITY: CH
MOBILITY SCORE: 24
DAILY ACTIVITIY SCORE: 24
SUGGESTED CMS G CODE MODIFIER MOBILITY: CH

## 2024-03-09 ASSESSMENT — PATIENT HEALTH QUESTIONNAIRE - PHQ9
2. FEELING DOWN, DEPRESSED, IRRITABLE, OR HOPELESS: NOT AT ALL
1. LITTLE INTEREST OR PLEASURE IN DOING THINGS: NOT AT ALL
SUM OF ALL RESPONSES TO PHQ9 QUESTIONS 1 AND 2: 0
2. FEELING DOWN, DEPRESSED, IRRITABLE, OR HOPELESS: NOT AT ALL
SUM OF ALL RESPONSES TO PHQ9 QUESTIONS 1 AND 2: 0
SUM OF ALL RESPONSES TO PHQ9 QUESTIONS 1 AND 2: 0
1. LITTLE INTEREST OR PLEASURE IN DOING THINGS: NOT AT ALL
2. FEELING DOWN, DEPRESSED, IRRITABLE, OR HOPELESS: NOT AT ALL
1. LITTLE INTEREST OR PLEASURE IN DOING THINGS: NOT AT ALL

## 2024-03-09 ASSESSMENT — ENCOUNTER SYMPTOMS
CHILLS: 0
ABDOMINAL PAIN: 0
NAUSEA: 0
VOMITING: 0
FEVER: 0

## 2024-03-09 ASSESSMENT — COPD QUESTIONNAIRES
HAVE YOU SMOKED AT LEAST 100 CIGARETTES IN YOUR ENTIRE LIFE: NO/DON'T KNOW
COPD SCREENING SCORE: 0
DURING THE PAST 4 WEEKS HOW MUCH DID YOU FEEL SHORT OF BREATH: NONE/LITTLE OF THE TIME
DO YOU EVER COUGH UP ANY MUCUS OR PHLEGM?: NO/ONLY WITH OCCASIONAL COLDS OR INFECTIONS

## 2024-03-09 ASSESSMENT — PAIN DESCRIPTION - PAIN TYPE
TYPE: ACUTE PAIN

## 2024-03-09 ASSESSMENT — FIBROSIS 4 INDEX
FIB4 SCORE: 1.463127041900579712
FIB4 SCORE: 1.463127041900579712

## 2024-03-09 NOTE — PROGRESS NOTES
..4 Eyes Skin Assessment Completed by MAYNOR Otero and MAYNOR Saavedra.    Head WDL  Ears WDL  Nose WDL  Mouth WDL  Neck WDL  Breast/Chest WDL  Shoulder Blades WDL  Spine WDL  (R) Arm/Elbow/Hand WDL  (L) Arm/Elbow/Hand WDL  Abdomen WDL  Groin WDL  Scrotum/Coccyx/Buttocks WDL  (R) Leg WDL  (L) Leg WDL  (R) Heel/Foot/Toe WDL  (L) Heel/Foot/Toe WDL          Devices In Places Blood Pressure Cuff      Interventions In Place N/A    Possible Skin Injury No    Pictures Uploaded Into Epic N/A  Wound Consult Placed N/A  RN Wound Prevention Protocol Ordered No

## 2024-03-09 NOTE — ED TRIAGE NOTES
Pt to triage .  Chief Complaint   Patient presents with    Abnormal Labs     Patient has positive results in both blood culture sets. 1 set has Gram-positive cocci and the other set has Gram-positive cocci and Gram-positive rods.    Pt states pharmacist called and told her to come to ED for further evaluation    decreased ROM/decreased strength

## 2024-03-09 NOTE — H&P
Hospital Medicine History & Physical Note    Date of Service  3/8/2024    Primary Care Physician  RENE Jimenez    Consultants      Specialist Names:     Code Status  Full Code    Chief Complaint  Chief Complaint   Patient presents with    Abnormal Labs     Patient has positive results in both blood culture sets. 1 set has Gram-positive cocci and the other set has Gram-positive cocci and Gram-positive rods.       History of Presenting Illness  Tiffany Francois is a 33 y.o. female who presented 3/8/2024 with past medical history of hypertension history who presents to the hospital for fevers and chills.  Patient was seen in the hospital yesterday for vaginal bleeding.  Patient has been bleeding for the past 3 weeks.  She had a suspected miscarriage with the passage of clots.  She did have some green discharge but that has resolved now.  Patient was discharged with oral Flagyl which she was taking at home.  Patient was having episodes of vomiting.  She denies any sore throat, shortness of breath, joint swelling or aches, cough, skin wounds, back pain, urine symptoms, abdominal pain.  Patient denies any drug use.  Patient did have a transvaginal ultrasound and CT scan of the abdomen that found retained products of conception.  Garden and level came back positive on the previous test.    I discussed the plan of care with patient.    Review of Systems  Review of Systems   Constitutional:  Positive for chills, fever and malaise/fatigue. Negative for diaphoresis.   HENT:  Negative for congestion, ear discharge, ear pain, hearing loss, nosebleeds, sinus pain, sore throat and tinnitus.    Eyes:  Negative for blurred vision, double vision, photophobia and pain.   Respiratory:  Negative for cough, hemoptysis, sputum production, shortness of breath, wheezing and stridor.    Cardiovascular:  Negative for chest pain, palpitations, orthopnea, claudication, leg swelling and PND.   Gastrointestinal:  Negative  for abdominal pain, blood in stool, constipation, diarrhea, heartburn, melena, nausea and vomiting.   Genitourinary:  Negative for dysuria, flank pain, frequency, hematuria and urgency.   Musculoskeletal:  Negative for back pain, falls, joint pain, myalgias and neck pain.   Skin:  Negative for itching and rash.   Neurological:  Negative for dizziness, tingling, tremors, weakness and headaches.   Endo/Heme/Allergies:  Negative for environmental allergies and polydipsia. Does not bruise/bleed easily.   Psychiatric/Behavioral:  Negative for depression, hallucinations, substance abuse and suicidal ideas.        Past Medical History   has a past medical history of Hypertension and Migraine.    Surgical History   has a past surgical history that includes appendectomy.     Family History  family history includes Breast Cancer in her paternal grandmother; Cancer in her paternal grandmother; Diabetes in her maternal uncle and paternal grandfather; Heart Disease in her paternal grandfather; Hypertension in her maternal aunt and maternal uncle; No Known Problems in her father and mother.   Family history reviewed with patient. There is no family history that is pertinent to the chief complaint.     Social History   reports that she has never smoked. She has never used smokeless tobacco. She reports current alcohol use of about 1.2 - 1.8 oz of alcohol per week. She reports that she does not use drugs.    Allergies  No Known Allergies    Medications  Prior to Admission Medications   Prescriptions Last Dose Informant Patient Reported? Taking?   UBRELVY 50 MG Tab   Yes No   Sig: TAKE 1 TAB BY MOUTH ONSET OF MIRGRAINE. 1 TAB IF NO RELIEF AFTER 2 HRS MAX 2TAB/24HRS *NEEDS PA   lisinopril-hydrochlorothiazide (PRINZIDE) 10-12.5 MG per tablet   No No   Sig: Take 1 Tablet by mouth every day.   metroNIDAZOLE (FLAGYL) 500 MG Tab 3/8/2024  No Yes   Sig: Take 1 Tablet by mouth 2 times a day for 7 days.   norethindrone (HELEN) 0.35 MG  tablet   No No   Sig: TAKE 1 TABLET BY MOUTH EVERY DAY   ondansetron (ZOFRAN ODT) 4 MG TABLET DISPERSIBLE   No No   Sig: Take 1 Tablet by mouth every 6 hours as needed for Nausea/Vomiting.      Facility-Administered Medications: None       Physical Exam  Temp:  [36.6 °C (97.8 °F)] 36.6 °C (97.8 °F)  Pulse:  [82-90] 82  Resp:  [16] 16  BP: (110-118)/(72-76) 110/75  SpO2:  [96 %-97 %] 97 %  Blood Pressure: 110/75   Temperature: 36.6 °C (97.8 °F)   Pulse: 82   Respiration: 16   Pulse Oximetry: 97 %       Physical Exam  Vitals and nursing note reviewed.   Constitutional:       General: She is not in acute distress.     Appearance: Normal appearance. She is not ill-appearing, toxic-appearing or diaphoretic.   HENT:      Head: Normocephalic and atraumatic.      Nose: No congestion or rhinorrhea.      Mouth/Throat:      Pharynx: No oropharyngeal exudate or posterior oropharyngeal erythema.   Eyes:      General: No scleral icterus.  Neck:      Vascular: No carotid bruit or JVD.   Cardiovascular:      Rate and Rhythm: Normal rate and regular rhythm.      Pulses: Normal pulses.      Heart sounds: Normal heart sounds. No murmur heard.     No friction rub. No gallop.   Pulmonary:      Effort: Pulmonary effort is normal. No respiratory distress.      Breath sounds: No stridor. No wheezing, rhonchi or rales.   Abdominal:      General: Abdomen is flat. There is no distension.      Palpations: There is no mass.      Tenderness: There is no abdominal tenderness. There is no left CVA tenderness, guarding or rebound.      Hernia: No hernia is present.   Musculoskeletal:         General: No swelling. Normal range of motion.      Cervical back: No rigidity. No muscular tenderness.      Right lower leg: No edema.      Left lower leg: No edema.   Lymphadenopathy:      Cervical: No cervical adenopathy.   Skin:     General: Skin is warm and dry.      Capillary Refill: Capillary refill takes more than 3 seconds.      Coloration: Skin is not  "jaundiced or pale.      Findings: No bruising or erythema.   Neurological:      Mental Status: She is alert.         Laboratory:  Recent Labs     03/07/24 0800 03/08/24 2016   WBC 6.6 5.1   RBC 4.07* 3.81*   HEMOGLOBIN 12.3 11.7*   HEMATOCRIT 37.2 33.6*   MCV 91.4 88.2   MCH 30.2 30.7   MCHC 33.1 34.8   RDW 40.8 39.6   PLATELETCT 189 155*   MPV 10.9 11.4     Recent Labs     03/07/24  0800 03/08/24 2016   SODIUM 136 137   POTASSIUM 3.1* 3.1*   CHLORIDE 101 98   CO2 20 23   GLUCOSE 132* 122*   BUN 8 12   CREATININE 0.60 0.81   CALCIUM 9.0 9.1     Recent Labs     03/07/24 0800 03/08/24 2016   ALTSGPT 22 21   ASTSGOT 32 28   ALKPHOSPHAT 105* 102*   TBILIRUBIN 0.3 0.3   GLUCOSE 132* 122*         No results for input(s): \"NTPROBNP\" in the last 72 hours.      No results for input(s): \"TROPONINT\" in the last 72 hours.    Imaging:  DX-CHEST-LIMITED (1 VIEW)    (Results Pending)       X-Ray:  I have personally reviewed the images and compared with prior images.    Assessment/Plan:  Justification for Admission Status  I anticipate this patient will require at least two midnights for appropriate medical management, necessitating inpatient admission because bacteremia    Patient will need a Med/Surg bed on MEDICAL service .  The need is secondary to bacteremia.    * Bacteremia- (present on admission)  Assessment & Plan  Patient had 2 sets of cultures that were positive for gram-positive cocci.  I am not sure if this is Gardnerella bacteremia??  Start IV Flagyl and IV vancomycin.  Will follow trough levels and adjust levels accordingly  I have ordered ESR and CRP  Infectious disease consult    Miscarriage  Assessment & Plan  3 weeks of vaginal bleeding  Recent CT scan and transvaginal ultrasound was completed that found retained products of conception  hCG downtrending      Essential hypertension- (present on admission)  Assessment & Plan  Hold blood pressure medications for now        VTE prophylaxis: SCDs/TEDs  "

## 2024-03-09 NOTE — CONSULTS
"INFECTIOUS DISEASES INPATIENT CONSULT NOTE     Date of Service:3/9/24    Consult Requested By: Eduardo Cerrato M.D.    Reason for Consultation: +blood cult    History of Present Illness:   Tiffany Francois is a 33 y.o. female  admitted 3/8/2024 for above  Currently sleeping soundly with partner in bed  Seen in ED 3/7: blood cult done and vaginal discharge testing  From admit \"33 y.o. female who presented 3/8/2024 with past medical history of hypertension history who presents to the hospital for fevers and chills.  Patient was seen in the hospital yesterday for vaginal bleeding.  Patient has been bleeding for the past 3 weeks.  She had a suspected miscarriage with the passage of clots.  She did have some green discharge but that has resolved now.  Patient was discharged with oral Flagyl which she was taking at home.  Patient was having episodes of vomiting.  She denies any sore throat, shortness of breath, joint swelling or aches, cough, skin wounds, back pain, urine symptoms, abdominal pain.  Patient denies any drug use.  Patient did have a transvaginal ultrasound and CT scan of the abdomen that found retained products of conception.\" Gardenerella+ but gonorrhea and chlamydia neg on vaginal discharge   On vanco and flagyl  Infectious Diseases consulted for antibiotic recommendation and management    Review Of Systems:  Unable to obtain due to somnolence     PMH:   Past Medical History:   Diagnosis Date    Hypertension     Migraine          PSH:  Past Surgical History:   Procedure Laterality Date    APPENDECTOMY         FAMILY HX:  Family History   Problem Relation Age of Onset    No Known Problems Mother     No Known Problems Father     Hypertension Maternal Aunt     Diabetes Maternal Uncle     Hypertension Maternal Uncle     Breast Cancer Paternal Grandmother     Cancer Paternal Grandmother         breast    Diabetes Paternal Grandfather     Heart Disease Paternal Grandfather     Psychiatric Illness Neg Hx  "    Colorectal Cancer Neg Hx     Tubal Cancer Neg Hx     Peritoneal Cancer Neg Hx     Ovarian Cancer Neg Hx     Hyperlipidemia Neg Hx     Stroke Neg Hx        SOCIAL HX:  Social History     Socioeconomic History    Marital status: Single     Spouse name: Not on file    Number of children: Not on file    Years of education: Not on file    Highest education level: Bachelor's degree (e.g., BA, AB, BS)   Occupational History    Not on file   Tobacco Use    Smoking status: Never    Smokeless tobacco: Never   Vaping Use    Vaping Use: Never used   Substance and Sexual Activity    Alcohol use: Yes     Alcohol/week: 1.2 - 1.8 oz     Types: 2 - 3 Cans of beer per week     Comment: occassional - twice monthly at most    Drug use: No    Sexual activity: Yes     Partners: Male     Birth control/protection: Pill   Other Topics Concern    Not on file   Social History Narrative    Not on file     Social Determinants of Health     Financial Resource Strain: Low Risk  (10/3/2022)    Overall Financial Resource Strain (CARDIA)     Difficulty of Paying Living Expenses: Not hard at all   Food Insecurity: Unknown (10/3/2022)    Hunger Vital Sign     Worried About Running Out of Food in the Last Year: Never true     Ran Out of Food in the Last Year: Not on file   Transportation Needs: No Transportation Needs (10/3/2022)    PRAPARE - Transportation     Lack of Transportation (Medical): No     Lack of Transportation (Non-Medical): No   Physical Activity: Sufficiently Active (10/3/2022)    Exercise Vital Sign     Days of Exercise per Week: 4 days     Minutes of Exercise per Session: 60 min   Stress: Stress Concern Present (10/3/2022)    [employer] [place] [employer] - Occupational Stress Questionnaire     Feeling of Stress : To some extent   Social Connections: Unknown (10/3/2022)    Social Connection and Isolation Panel [NHANES]     Frequency of Communication with Friends and Family: More than three times a week     Frequency of  Social Gatherings with Friends and Family: Twice a week     Attends Protestant Services: Patient declined     Active Member of Clubs or Organizations: No     Attends Club or Organization Meetings: Patient declined     Marital Status: Living with partner   Intimate Partner Violence: Not on file   Housing Stability: High Risk (10/3/2022)    Housing Stability Vital Sign     Unable to Pay for Housing in the Last Year: Yes     Number of Places Lived in the Last Year: 1     Unstable Housing in the Last Year: No     Social History     Tobacco Use   Smoking Status Never   Smokeless Tobacco Never     Social History     Substance and Sexual Activity   Alcohol Use Yes    Alcohol/week: 1.2 - 1.8 oz    Types: 2 - 3 Cans of beer per week    Comment: occassional - twice monthly at most       Allergies/Intolerances:  No Known Allergies      Other Current Medications:    Current Facility-Administered Medications:     potassium chloride SA (Kdur) tablet 20 mEq, 20 mEq, Oral, BID, Eduardo Cerrato M.D., 20 mEq at 03/09/24 1156    cefTRIAXone (Rocephin) syringe 2,000 mg, 2,000 mg, Intravenous, Q24HRS, Barb Garvey M.D.    Respiratory Therapy Consult, , Nebulization, Continuous RT, Alfredo Marie M.D.    acetaminophen (Tylenol) tablet 650 mg, 650 mg, Oral, Q6HRS PRN, Alfredo Marie M.D., 650 mg at 03/09/24 1035    ondansetron (Zofran) syringe/vial injection 4 mg, 4 mg, Intravenous, Q4HRS PRN, Alfredo Marie M.D.    ondansetron (Zofran ODT) dispertab 4 mg, 4 mg, Oral, Q4HRS PRN, Alfredo Marie M.D.    promethazine (Phenergan) tablet 12.5-25 mg, 12.5-25 mg, Oral, Q4HRS PRN, Alfredo Marie M.D.    promethazine (Phenergan) suppository 12.5-25 mg, 12.5-25 mg, Rectal, Q4HRS PRN, Alfredo Marie M.D.    prochlorperazine (Compazine) injection 5-10 mg, 5-10 mg, Intravenous, Q4HRS PRN, Hamad Frank, M.D.    metroNIDAZOLE (Flagyl) IVPB 500 mg, 500 mg, Intravenous, Q12HRS, Alfredo Marie M.D., Stopped at 03/09/24 0331    MD Alert...Vancomycin per  Pharmacy, , Other, PHARMACY TO DOSE, Alfredo Marie M.D.    vancomycin (Vancocin) 1,000 mg in  mL IVPB, 1,000 mg, Intravenous, Q12HR, Alfredo Marie M.D., Last Rate: 125 mL/hr at 24 1156, 1,000 mg at 24 1156      Most Recent Vital Signs:  /69   Pulse 75   Temp 36.3 °C (97.3 °F) (Temporal)   Resp 18   Wt 70.3 kg (154 lb 15.7 oz)   LMP 2024 (Approximate)   SpO2 96%   BMI 23.57 kg/m²   Temp  Av.6 °C (97.8 °F)  Min: 36.2 °C (97.2 °F)  Max: 37.2 °C (99 °F)    Physical Exam:  General: well-appearing, well nourished no acute distress  Chest:  unlabored.  Cardiac: RRR,  not tachy  Skin: no visible rashes   Neuro: sleeping      Pertinent Lab Results:  Recent Labs     24  0824  0353   WBC 6.6 5.1 6.8      Recent Labs     24  0824  0353   HEMOGLOBIN 12.3 11.7* 11.0*   HEMATOCRIT 37.2 33.6* 31.7*   MCV 91.4 88.2 88.5   MCH 30.2 30.7 30.7   PLATELETCT 189 155* 138*         Recent Labs     24  0824  0353   SODIUM 136 137 138   POTASSIUM 3.1* 3.1* 3.4*   CHLORIDE 101 98 102   CO2 20 23 23   CREATININE 0.60 0.81 0.56        Recent Labs     24  0824  0353   ALBUMIN 4.3 4.1 3.6        Pertinent Micro:  Results       Procedure Component Value Units Date/Time    Blood Culture - Draw one from central line and one from peripheral site [[id number]] Collected: 24    Order Status: Completed Specimen: Blood from Line Updated: 24     Significant Indicator NEG     Source BLD     Site PERIPHERAL     Culture Result No Growth  Note: Blood cultures are incubated for 5 days and  are monitored continuously.Positive blood cultures  are called to the RN and reported as soon as  they are identified.      Narrative:      Blood Cultures X2. Blood Cultures X2. Draw one blood culture  from central line and one blood culture peripherally. If no  line present, draw blood cultures  "times two peripherally from  different sites.  Left AC    Blood Culture - Draw one from central line and one from peripheral site [[id number]] Collected: 03/08/24 2040    Order Status: Completed Specimen: Blood from Peripheral Updated: 03/09/24 0654     Significant Indicator NEG     Source BLD     Site PERIPHERAL     Culture Result No Growth  Note: Blood cultures are incubated for 5 days and  are monitored continuously.Positive blood cultures  are called to the RN and reported as soon as  they are identified.      Narrative:      Blood Cultures X2. Draw one blood culture from central line  and one blood culture peripherally. If no line present, draw  blood cultures times two peripherally from different sites.  Left Wrist    MRSA By PCR (Amp) [[id number]] Collected: 03/08/24 2136    Order Status: Completed Specimen: Respirate from Nares Updated: 03/08/24 2359     MRSA by PCR Negative    Urinalysis [[id number]]  (Abnormal) Collected: 03/08/24 2041    Order Status: Completed Specimen: Urine Updated: 03/08/24 2125     Color Yellow     Character Clear     Specific Gravity 1.011     Ph 6.5     Glucose Negative mg/dL      Ketones Negative mg/dL      Protein Negative mg/dL      Bilirubin Negative     Urobilinogen, Urine 1.0     Nitrite Negative     Leukocyte Esterase Moderate     Occult Blood Moderate     Micro Urine Req Microscopic    Narrative:      Indication for culture:->Emergency Room Patient    Urine Culture (New) [[id number]] Collected: 03/08/24 2041    Order Status: Sent Specimen: Urine Updated: 03/08/24 2055    Narrative:      Indication for culture:->Emergency Room Patient          No results found for: \"BLOODCULTU\", \"BLDCULT\", \"BCHOLD\"     Studies:  CT-ABDOMEN-PELVIS WITH   Final Result       1.  There is hyperdense material in the central endometrial region of the uterus which could be blood products or retained products of conception based on the clinical history.   2.  No adnexal mass.   3.  No venous thrombosis " is identified.       US-OB TRANSVAGINAL ONLY   Final Result       1.  No evidence of intrauterine pregnancy.   2.  Complex material within the lower uterine segment and cervix could represent hemorrhage and/or retained products of conception.   3.  Right ovary is not visualized.            IMPRESSION:   N/V with flagyl PO  Gardnerella vaginitis  Recent miscarriage with RPC  3/7 +blood culture-true vs pseudobacteremia +GPC and GPR  Repeat blood cult 3/8 neg to date  -GBS associated with miscarriage      PLAN:   Continue to follow blood cultures  Agree with vanco and flagyl for now  Consult OB/Gyn if not already done for treatment of retained products of conception  Will need 7 days flagyl for Gardnerella  Final antibiotic recommendations per culture results and clinical course      Plan of care discussed with IM Eduardo Cerrato M.D.. Will continue to follow    Barb Garvey M.D.

## 2024-03-09 NOTE — PROGRESS NOTES
Pharmacy Vancomycin Kinetics Note for 3/8/2024     33 y.o. female on Vancomycin day # 1     Vancomycin Indication (AUC Dosing): Uncomplicated infection    Provider specified end date: 03/15/24    Active Antibiotics (From admission, onward)      Ordered     Ordering Provider       Fri Mar 8, 2024 10:17 PM    03/08/24 2217  MD Alert...Vancomycin per Pharmacy  PHARMACY TO DOSE        Question:  Indication(s) for vancomycin?  Answer:  Unknown source of infection    Alfredo Marie M.D.       Fri Mar 8, 2024 10:17 PM    03/08/24 2217  metroNIDAZOLE (Flagyl) IVPB 500 mg  EVERY 12 HOURS         Alfredo Marie M.D.       Fri Mar 8, 2024  9:26 PM    03/08/24 2126  vancomycin (Vancocin) 1,750 mg in  mL IVPB  (vancomycin (VANCOCIN) IV (LD + Maintenance))  ONCE         Kirk Foster M.D.            Dosing Weight: 71.4 kg (157 lb 6.5 oz)      Admission History: Admitted on 3/8/2024 for Bacteremia [R78.81]  Pertinent history: Pt presented to ER on 3/7 for miscarriage and BV. Blood cultures from encounter now growing gram positive cocci and gram positive rods. Patient does not have SIRS criteria. Vancoymicin initiated empirically    Allergies:     Patient has no known allergies.     Pertinent cultures to date:     Results       Procedure Component Value Units Date/Time    MRSA By PCR (Amp) [[id number]] Collected: 03/08/24 2136    Order Status: Sent Specimen: Respirate from Nares Updated: 03/08/24 2238    Urinalysis [[id number]]  (Abnormal) Collected: 03/08/24 2041    Order Status: Completed Specimen: Urine Updated: 03/08/24 2125     Color Yellow     Character Clear     Specific Gravity 1.011     Ph 6.5     Glucose Negative mg/dL      Ketones Negative mg/dL      Protein Negative mg/dL      Bilirubin Negative     Urobilinogen, Urine 1.0     Nitrite Negative     Leukocyte Esterase Moderate     Occult Blood Moderate     Micro Urine Req Microscopic    Narrative:      Indication for culture:->Emergency Room Patient    Blood Culture  - Draw one from central line and one from peripheral site [[id number]] Collected: 24    Order Status: Sent Specimen: Blood from Line Updated: 24    Narrative:      Blood Cultures X2. Blood Cultures X2. Draw one blood culture  from central line and one blood culture peripherally. If no  line present, draw blood cultures times two peripherally from  different sites.    Urine Culture (New) [[id number]] Collected: 24    Order Status: Sent Specimen: Urine Updated: 24    Narrative:      Indication for culture:->Emergency Room Patient    Blood Culture - Draw one from central line and one from peripheral site [[id number]] Collected: 24    Order Status: Sent Specimen: Blood from Peripheral Updated: 24    Narrative:      Blood Cultures X2. Draw one blood culture from central line  and one blood culture peripherally. If no line present, draw  blood cultures times two peripherally from different sites.            Labs:     Estimated Creatinine Clearance: 98.7 mL/min (by C-G formula based on SCr of 0.81 mg/dL).  Recent Labs     24  0824   WBC 6.6 5.1   NEUTSPOLYS 82.70* 64.90   BANDSSTABS 8.60  --      Recent Labs     24  0824   BUN 8 12   CREATININE 0.60 0.81   ALBUMIN 4.3 4.1     No intake or output data in the 24 hours ending 24 2239   /75   Pulse 82   Temp 36.6 °C (97.8 °F) (Temporal)   Resp 16   Wt 71.4 kg (157 lb 6.5 oz)   SpO2 97%  Temp (24hrs), Av.6 °C (97.8 °F), Min:36.6 °C (97.8 °F), Max:36.6 °C (97.8 °F)      List concerns for Vancomycin clearance:     None    Pharmacokinetics:     AUC kinetics:   Ke (hr ^-1): 0.0863 hr^-1  Half life: 8.03 hr  Clearance: 4.005  Estimated TDD: 2002.5  Estimated Dose: 834  Estimated interval: 10    A/P:     -  Vancomycin dose: 1,000 mg Q12H    -  Next vancomycin level(s):    - 3rd dose or sooner if renal function changes    -  Predicted vancomycin AUC from  initial AUC test calculator: 499 mg·hr/L    -  Comments: MRSA PCR ordered. Possible contaminant given polymicrobial growth in culture. Pharmacy will monitor therapy and adjust dosing as appropriate.     Benoit Kennedy, KevinD

## 2024-03-09 NOTE — ASSESSMENT & PLAN NOTE
Mild, small drop, likely from ongoing mild vaginal bleeding  Fe labs are normal  Trend daily, conservative transfusion threshold    I personally reviewed the iron labs on 3/10

## 2024-03-09 NOTE — ED NOTES
PIV G20 placed on the left lower forearm. Blood collected and sent to lab.     ED phlebotomist notified of 2nd blood culture.

## 2024-03-09 NOTE — ASSESSMENT & PLAN NOTE
Retained POC  Gynecology consulted 3/9  S/p 800 mcg of cytotec given PM of 3/9, minimal output  Repeat transvaginal US 3/10 shows continued retained POC and along with persistent bacteremia, likely would benefit from a D&C  D&C to be done this PM  Gynecology following and greatly appreciate their help

## 2024-03-09 NOTE — ED PROVIDER NOTES
ER Provider Note    Scribed for Dr. Kirk Foster M.D. by Rj Muniz. 3/8/2024  7:49 PM    Primary Care Provider: RENE Jimenez    CHIEF COMPLAINT  Chief Complaint   Patient presents with    Abnormal Labs     Patient has positive results in both blood culture sets. 1 set has Gram-positive cocci and the other set has Gram-positive cocci and Gram-positive rods.       EXTERNAL RECORDS REVIEWED  Other The patient's records show that she was seen by Dr. Alcala yesterday for evaluation of miscarriage concerns. The patient received blood work which was positive and was advised to return to the ED.    HPI/ROS    OUTSIDE HISTORIAN(S):  Significant other The patient's boyfriend was present at bedside.     Tiffany Francois is a 33 y.o. female who presents to the ED for evaluation of abnormal labs onset earlier today. The patient reports that she was seen at the ED yesterday for evaluation of miscarriage concerns. The patient received blood work at this time which had a positive culture and was advised to return to the ED today. The patient notes that she is not currently dealing with any upper respiratory systems but adds that she has been sneezing recently. She notes associated intermittent pain and cramping in her lower abdominal area and back when she is resting. The patient adds that her abdomen and back pain is similar to menstrual cramping. The patient adds that she has been struggling with keeping down food for the past few days but has been able to keep food down today. No alleviating or exacerbating factors noted. The patient denies a history of having a child or being pregnant.     PAST MEDICAL HISTORY  Past Medical History:   Diagnosis Date    Hypertension     Migraine      SURGICAL HISTORY  Past Surgical History:   Procedure Laterality Date    APPENDECTOMY       FAMILY HISTORY  Family History   Problem Relation Age of Onset    No Known Problems Mother     No Known Problems Father      Hypertension Maternal Aunt     Diabetes Maternal Uncle     Hypertension Maternal Uncle     Breast Cancer Paternal Grandmother     Cancer Paternal Grandmother         breast    Diabetes Paternal Grandfather     Heart Disease Paternal Grandfather     Psychiatric Illness Neg Hx     Colorectal Cancer Neg Hx     Tubal Cancer Neg Hx     Peritoneal Cancer Neg Hx     Ovarian Cancer Neg Hx     Hyperlipidemia Neg Hx     Stroke Neg Hx      SOCIAL HISTORY   reports that she has never smoked. She has never used smokeless tobacco. She reports current alcohol use of about 1.2 - 1.8 oz of alcohol per week. She reports that she does not use drugs.    CURRENT MEDICATIONS  Previous Medications    LISINOPRIL-HYDROCHLOROTHIAZIDE (PRINZIDE) 10-12.5 MG PER TABLET    Take 1 Tablet by mouth every day.    METRONIDAZOLE (FLAGYL) 500 MG TAB    Take 1 Tablet by mouth 2 times a day for 7 days.    NORETHINDRONE (HELEN) 0.35 MG TABLET    TAKE 1 TABLET BY MOUTH EVERY DAY    ONDANSETRON (ZOFRAN ODT) 4 MG TABLET DISPERSIBLE    Take 1 Tablet by mouth every 6 hours as needed for Nausea/Vomiting.    UBRELVY 50 MG TAB    TAKE 1 TAB BY MOUTH ONSET OF MIRGRAINE. 1 TAB IF NO RELIEF AFTER 2 HRS MAX 2TAB/24HRS *NEEDS PA     ALLERGIES  Patient has no known allergies.    PHYSICAL EXAM  /76   Pulse 90   Temp 36.6 °C (97.8 °F) (Temporal)   Resp 16   Wt 71.4 kg (157 lb 6.5 oz)   LMP 03/08/2024 (Approximate)   SpO2 97%   BMI 23.93 kg/m²     Constitutional: Alert in no apparent distress.  HENT: No signs of trauma, Bilateral external ears normal, Nose normal.   Eyes: Pupils are equal and reactive, Conjunctiva normal, Non-icteric.   Neck: Normal range of motion, No tenderness, Supple,   Cardiovascular: Regular rate and rhythm, no murmurs.   Thorax & Lungs: Normal breath sounds, No respiratory distress, No wheezing, No chest tenderness.   Abdomen: Bowel sounds normal, Soft, No tenderness, No masses, No pulsatile masses. No peritoneal signs.  Skin: Warm,  Dry, No erythema, No rash.   Back: No bony tenderness, No CVA tenderness.   Extremities: No edema, No tenderness, No cyanosis, no tenderness  Psychiatric: Affect normal     DIAGNOSTIC STUDIES & PROCEDURES    Labs:   Labs Reviewed   CBC WITH DIFFERENTIAL - Abnormal; Notable for the following components:       Result Value    RBC 3.81 (*)     Hemoglobin 11.7 (*)     Hematocrit 33.6 (*)     Platelet Count 155 (*)     All other components within normal limits   COMP METABOLIC PANEL - Abnormal; Notable for the following components:    Potassium 3.1 (*)     Glucose 122 (*)     Alkaline Phosphatase 102 (*)     All other components within normal limits   URINALYSIS - Abnormal; Notable for the following components:    Leukocyte Esterase Moderate (*)     Occult Blood Moderate (*)     All other components within normal limits    Narrative:     Indication for culture:->Emergency Room Patient   HCG QUANTITATIVE - Abnormal; Notable for the following components:    Bhcg 299.0 (*)     All other components within normal limits   URINE MICROSCOPIC (W/UA) - Abnormal; Notable for the following components:    Bacteria Few (*)     All other components within normal limits    Narrative:     Indication for culture:->Emergency Room Patient   LACTIC ACID   ESTIMATED GFR   URINE CULTURE(NEW)    Narrative:     Indication for culture:->Emergency Room Patient   BLOOD CULTURE    Narrative:     Blood Cultures X2. Draw one blood culture from central line  and one blood culture peripherally. If no line present, draw  blood cultures times two peripherally from different sites.   BLOOD CULTURE    Narrative:     Blood Cultures X2. Blood Cultures X2. Draw one blood culture  from central line and one blood culture peripherally. If no  line present, draw blood cultures times two peripherally from  different sites.   MRSA BY PCR (AMP)   SED RATE   CRP QUANTITIVE (NON-CARDIAC)   All labs reviewed by me.    COURSE & MEDICAL DECISION MAKING    ED Observation  Status? No; Patient does not meet criteria for ED Observation.     INITIAL ASSESSMENT AND PLAN  Care Narrative:       7:49 PM - Patient seen and evaluated at bedside. Discussed plan of care, including additional blood cultures and lab analysis. Explained that the positive blood culture means that the patient has bacteria present in her blood and that the patient likely has an infection somewhere in her body which would lead to the positive blood culture. Patient agrees to plan of care. Ordered Blood Culture X2, UA, CMP, CBC w/Diff, and Lactic Acid evaluate.    9:37 PM - Paged Hospitalist. Ordered for MRSA by PCR for evaluation. The patient was medicated with Vancocin 1,750 mg via IV.     10:05 PM -  I discussed the patient's case and the above findings with Dr. Marie (Hospitalist) who agrees to evaluate the patient for hospitalization.    ADDITIONAL PROBLEM LIST AND DISPOSITION  Patient with recent positive blood cultures.  Ultimately this time the patient does have what I believe is likely a miscarriage that is now complete.  hCGs are downtrending.  This might be the way that the bacteria did injure the patient's blood.  IV antibiotics are necessary.  The patient is mildly anemic but not dangerously so.  Potassium is low.  IV antibiotics will be given.  I spoke with the pharmacist as well as the admitting physician.  Vancomycin seems appropriate this time.  Will admit the patient to the hospital in guarded condition.  The patient has no findings to suggest lung infection or urine infection.               DISPOSITION AND DISCUSSIONS  I have discussed management of the patient with the following physicians and JORGE's: Dr. Marie (Hospitalist)    Discussion of management with other QHP or appropriate source(s): RT            Decision tools and prescription drugs considered including, but not limited to: Antibiotics for potential bacteremia. .    DISPOSITION:  Patient will be hospitalized by Dr. Johnson in guarded  condition.    FINAL IMPRESSION   1. Bacteremia       Rj SCHROEDER (Scribe), am scribing for, and in the presence of, Kirk Foster M.D..    Electronically signed by: Rj Muniz (Scribe), 3/8/2024    Kirk SCHROEDER M.D. personally performed the services described in this documentation, as scribed by Rj Muniz in my presence, and it is both accurate and complete.    The note accurately reflects work and decisions made by me.  Kirk Foster M.D.  3/9/2024  2:07 AM

## 2024-03-09 NOTE — CARE PLAN
The patient is Stable - Low risk of patient condition declining or worsening    Shift Goals  Clinical Goals: monitor bleeding and signs infection  Patient Goals: rest    Progress made toward(s) clinical / shift goals:  Patient alert and oriented, resting comfortably in bed.  All meds given per Mar and all belongings within reach.  Abx running through 20G in the forearm.  Respirations even and unlabored.     Patient is not progressing towards the following goals:

## 2024-03-09 NOTE — ASSESSMENT & PLAN NOTE
Remains clinically about the same on 3/10  Initial blood cultures from 3/7 are growing:  Culture Result Prevotella bivia Abnormal  P   Culture Result      Abnormal   Anaerobic Gram positive chantelle  Solobacterium moorei  P      Culture Result      Abnormal   Actinomyces turicensis  Possible Contaminant  Isolated from one set only, please correlate with clinical  condition. Contact the Microbiology department within 48 hr  if identification and susceptibility are needed.       Repeat blood cultures from 3/8 now growing Gram positive cocci as well (likely to be Prevotella)  Repeat blood cultures on 3/10  Persistent bacteremia, continue oral flagyl, high risk for clinical worsening, added doxycycline  Likely an element of endometritis  MRSA nares negative, WBC remains normal  ID consulted  Stopped IV vancomycin    I personally reviewed all of these labs on 3/10

## 2024-03-09 NOTE — PROGRESS NOTES
Med rec completed per patient at bedside.  Allergies reviewed with patient. NKDA.  Patient's preferred pharmacy: [facility] at [address] [facility].    Outpatient antibiotics within the last 30 days: Patient is on a 7 day course of metronidazole prescribed 3/7/2024.    ANTICOAGULATION: NONE.

## 2024-03-09 NOTE — ED NOTES
"ED Positive Culture Follow-up/Notification Note:    Date: 3/8/24     Patient seen in the ED on 3/7/2024 for evaluation of vaginal bleeding. Per ED provider note, \"Patient states that she took a pregnancy test approximately 4 weeks ago.  A week after this patient had heavy bleeding and passage of tissue and clots, she suspected she had a miscarriage.  Following this patient had some spotting up until 3 days ago at which point vaginal bleeding worsened significantly.  She is going through around 8 pads and tampons per day.  She denies any use of anticoagulants or antiplatelets or bleeding problems in the past.  She has not noticed any passage of tissue.  She denies any vaginal discharge.  She reports some mild suprapubic pain and some left flank pain.  She denies any dysuria urgency or frequency.  Patient states she is concerned that she is also been having some mild chills.  Patient has had 2 episodes of nonbloody nonbilious emesis and remains mildly nauseous.\"  1. Vaginal bleeding in pregnancy, first trimester    2. Bacterial vaginosis    3. Nausea       Discharge Medication List as of 3/7/2024 12:31 PM        START taking these medications    Details   metroNIDAZOLE (FLAGYL) 500 MG Tab Take 1 Tablet by mouth 2 times a day for 7 days., Disp-14 Tablet, R-0, Normal      ondansetron (ZOFRAN ODT) 4 MG TABLET DISPERSIBLE Take 1 Tablet by mouth every 6 hours as needed for Nausea/Vomiting., Disp-10 Tablet, R-0, Normal             Allergies: Patient has no known allergies.     Vitals:    03/07/24 1001 03/07/24 1143 03/07/24 1211 03/07/24 1220   BP: 134/63   117/59   Pulse: (!) 136 (!) 104 (!) 106    Resp:  19 16    Temp:   36.3 °C (97.4 °F)    TempSrc:   Temporal    SpO2: 96% 94% 95%    Weight:       Height:           Final cultures:   Results       Procedure Component Value Units Date/Time    Urine Culture (New) [[id number]] Collected: 03/07/24 0851    Order Status: Completed Specimen: Urine Updated: 03/08/24 1534     " "Significant Indicator NEG     Source UR     Site -     Culture Result Culture in progress.    Narrative:      Indication for culture:->Emergency Room Patient  Indication for culture:->Emergency Room Patient    BLOOD CULTURE x2 [[id number]]  (Abnormal) Collected: 03/07/24 0953    Order Status: Completed Specimen: Blood from Peripheral Updated: 03/08/24 1415     Significant Indicator POS     Source BLD     Site PERIPHERAL     Culture Result Growth detected by Bactec instrument. 03/08/2024  14:03  Gram Stain: Gram positive cocci and Gram positive rods.      Narrative:      CALL  Hung  ER tel. ,  CALLED  ER tel.  03/08/2024, 14:15, RB PERF. RESULTS CALLED TO: [id number]  Per Hospital Policy: Only change Specimen Src: to \"Line\" if  specified by physician order.  Right AC    BLOOD CULTURE x2 [[id number]]  (Abnormal) Collected: 03/07/24 0806    Order Status: Completed Specimen: Blood from Peripheral Updated: 03/08/24 1149     Significant Indicator POS     Source BLD     Site PERIPHERAL     Culture Result Growth detected by Bactec instrument.  03/08/2024  07:07      Gram-positive cocci  Unable to identify by Sepsityper, additional studies  pending.      Narrative:      CALL  Hung  ER tel. ,  CALLED  ER tel.  03/08/2024, 07:09, RB PERF. RESULTS CALLED TO: [id number]  Per Hospital Policy: Only change Specimen Src: to \"Line\" if  specified by physician order.  Left Hand    Chlamydia/GC, PCR (Genital/Anal swab) [[id number]] Collected: 03/07/24 1010    Order Status: Completed Specimen: Genital Updated: 03/07/24 1819     C. trachomatis by PCR Negative     N. gonorrhoeae by PCR Negative     Source Vaginal    Narrative:      Indication for culture:->Pregnant women: fever and/or  asymptomatic screening    URINALYSIS [[id number]]  (Abnormal) Collected: 03/07/24 0851    Order Status: Completed Updated: 03/07/24 0944     Color Red     Character Clear     Specific Gravity see comment     Comment: Unable to determine test result due to color " interference  from the urine.          Ph see comment     Comment: Unable to determine test result due to color interference  from the urine.          Glucose see comment mg/dL      Comment: Unable to determine test result due to color interference  from the urine.          Ketones see comment mg/dL      Comment: Unable to determine test result due to color interference  from the urine.          Protein see comment mg/dL      Comment: Unable to determine test result due to color interference  from the urine.          Bilirubin see comment     Comment: Unable to determine test result due to color interference  from the urine.          Urobilinogen, Urine see comment     Comment: Unable to determine test result due to color interference  from the urine.          Nitrite see comment     Comment: Unable to determine test result due to color interference  from the urine.          Leukocyte Esterase see comment     Comment: Unable to determine test result due to color interference  from the urine.          Occult Blood see comment     Comment: Unable to determine test result due to color interference  from the urine.          Micro Urine Req Microscopic     Comment: wbc: 0-2  rbc: 0-2  squamous epithelials: many  bacteria: few         Narrative:      Indication for culture:->Emergency Room Patient    URINALYSIS,CULTURE IF INDICATED [[id number]]     Order Status: Sent Specimen: Urine     WET PREP [[id number]] Collected: 03/07/24 0000    Order Status: Canceled Specimen: Cervical             Plan:   Patient has positive results in both blood culture sets. 1 set has Gram-positive cocci and the other set has Gram-positive cocci and Gram-positive rods. Usually in the setting of 2 of 2 sets of blood cultures being positive the likelihood of infection is high, but since one of the sets is polymicrobial the likelihood of a contaminant is higher.  Attempted to call patient and left a voicemail. Will send letter to patient requesting  call back. If patient has follow-up in near future and feels well, will consider having patient monitor for fever or chills and return at first sign of infection. If patient is not feeling well in any way, will advise patient to return to emergency department.   Patient has been instructed to come in to ED tomorrow for a repeat hCG. Patient reports she feels better and picked up her metronidazole. Patient reports she had a hot and cold sweats with shaking. Patient does not have a thermometer so she's not sure if she has fevered. Patient has heavier bleeding the last 3 days. Patient denies implantable hardware, pacemaker, and prosthetic heart valves. Patient denies any recent injuries with a possible portal of entry into the blood.  Advised for patient to come back in for further assessment. Patient agreeable to return to emergency department.  Recommend treating with IV vancomycin.  Abrahan Montiel, PharmD

## 2024-03-10 ENCOUNTER — APPOINTMENT (OUTPATIENT)
Dept: RADIOLOGY | Facility: MEDICAL CENTER | Age: 34
DRG: 770 | End: 2024-03-10
Attending: INTERNAL MEDICINE
Payer: COMMERCIAL

## 2024-03-10 ENCOUNTER — ANESTHESIA (OUTPATIENT)
Dept: SURGERY | Facility: MEDICAL CENTER | Age: 34
DRG: 770 | End: 2024-03-10
Payer: COMMERCIAL

## 2024-03-10 ENCOUNTER — ANESTHESIA EVENT (OUTPATIENT)
Dept: SURGERY | Facility: MEDICAL CENTER | Age: 34
DRG: 770 | End: 2024-03-10
Payer: COMMERCIAL

## 2024-03-10 LAB
ABO + RH BLD: NORMAL
ABO GROUP BLD: NORMAL
ANISOCYTOSIS BLD QL SMEAR: ABNORMAL
B-HCG SERPL-ACNC: 181 MIU/ML (ref 0–5)
BACTERIA BLD CULT: ABNORMAL
BACTERIA UR CULT: NORMAL
BASOPHILS # BLD AUTO: 1.7 % (ref 0–1.8)
BASOPHILS # BLD: 0.08 K/UL (ref 0–0.12)
BLD GP AB SCN SERPL QL: NORMAL
EOSINOPHIL # BLD AUTO: 0.08 K/UL (ref 0–0.51)
EOSINOPHIL NFR BLD: 1.7 % (ref 0–6.9)
ERYTHROCYTE [DISTWIDTH] IN BLOOD BY AUTOMATED COUNT: 41.1 FL (ref 35.9–50)
HCT VFR BLD AUTO: 32.9 % (ref 37–47)
HGB BLD-MCNC: 11 G/DL (ref 12–16)
LYMPHOCYTES # BLD AUTO: 2.04 K/UL (ref 1–4.8)
LYMPHOCYTES NFR BLD: 42.6 % (ref 22–41)
MANUAL DIFF BLD: NORMAL
MCH RBC QN AUTO: 30.5 PG (ref 27–33)
MCHC RBC AUTO-ENTMCNC: 33.4 G/DL (ref 32.2–35.5)
MCV RBC AUTO: 91.1 FL (ref 81.4–97.8)
MICROCYTES BLD QL SMEAR: ABNORMAL
MONOCYTES # BLD AUTO: 0.46 K/UL (ref 0–0.85)
MONOCYTES NFR BLD AUTO: 9.6 % (ref 0–13.4)
MORPHOLOGY BLD-IMP: NORMAL
NEUTROPHILS # BLD AUTO: 2.13 K/UL (ref 1.82–7.42)
NEUTROPHILS NFR BLD: 44.4 % (ref 44–72)
NRBC # BLD AUTO: 0 K/UL
NRBC BLD-RTO: 0 /100 WBC (ref 0–0.2)
OVALOCYTES BLD QL SMEAR: NORMAL
PLATELET # BLD AUTO: 185 K/UL (ref 164–446)
PLATELET BLD QL SMEAR: NORMAL
PMV BLD AUTO: 11 FL (ref 9–12.9)
POIKILOCYTOSIS BLD QL SMEAR: NORMAL
RBC # BLD AUTO: 3.61 M/UL (ref 4.2–5.4)
RBC BLD AUTO: PRESENT
RH BLD: NORMAL
SIGNIFICANT IND 70042: ABNORMAL
SIGNIFICANT IND 70042: ABNORMAL
SIGNIFICANT IND 70042: NORMAL
SITE SITE: ABNORMAL
SITE SITE: ABNORMAL
SITE SITE: NORMAL
SOURCE SOURCE: ABNORMAL
SOURCE SOURCE: ABNORMAL
SOURCE SOURCE: NORMAL
WBC # BLD AUTO: 4.8 K/UL (ref 4.8–10.8)

## 2024-03-10 PROCEDURE — 160035 HCHG PACU - 1ST 60 MINS PHASE I: Performed by: OBSTETRICS & GYNECOLOGY

## 2024-03-10 PROCEDURE — 160002 HCHG RECOVERY MINUTES (STAT): Performed by: OBSTETRICS & GYNECOLOGY

## 2024-03-10 PROCEDURE — 10D17ZZ EXTRACTION OF PRODUCTS OF CONCEPTION, RETAINED, VIA NATURAL OR ARTIFICIAL OPENING: ICD-10-PCS | Performed by: OBSTETRICS & GYNECOLOGY

## 2024-03-10 PROCEDURE — 700101 HCHG RX REV CODE 250: Performed by: OBSTETRICS & GYNECOLOGY

## 2024-03-10 PROCEDURE — 85027 COMPLETE CBC AUTOMATED: CPT

## 2024-03-10 PROCEDURE — A9270 NON-COVERED ITEM OR SERVICE: HCPCS | Performed by: ANESTHESIOLOGY

## 2024-03-10 PROCEDURE — 700102 HCHG RX REV CODE 250 W/ 637 OVERRIDE(OP): Performed by: INTERNAL MEDICINE

## 2024-03-10 PROCEDURE — 700102 HCHG RX REV CODE 250 W/ 637 OVERRIDE(OP): Performed by: HOSPITALIST

## 2024-03-10 PROCEDURE — 160009 HCHG ANES TIME/MIN: Performed by: OBSTETRICS & GYNECOLOGY

## 2024-03-10 PROCEDURE — 700111 HCHG RX REV CODE 636 W/ 250 OVERRIDE (IP): Performed by: HOSPITALIST

## 2024-03-10 PROCEDURE — 86901 BLOOD TYPING SEROLOGIC RH(D): CPT

## 2024-03-10 PROCEDURE — 700105 HCHG RX REV CODE 258: Performed by: ANESTHESIOLOGY

## 2024-03-10 PROCEDURE — A9270 NON-COVERED ITEM OR SERVICE: HCPCS | Performed by: HOSPITALIST

## 2024-03-10 PROCEDURE — A9270 NON-COVERED ITEM OR SERVICE: HCPCS | Performed by: INTERNAL MEDICINE

## 2024-03-10 PROCEDURE — 84702 CHORIONIC GONADOTROPIN TEST: CPT

## 2024-03-10 PROCEDURE — 85007 BL SMEAR W/DIFF WBC COUNT: CPT

## 2024-03-10 PROCEDURE — 86900 BLOOD TYPING SEROLOGIC ABO: CPT

## 2024-03-10 PROCEDURE — A9270 NON-COVERED ITEM OR SERVICE: HCPCS | Mod: JZ | Performed by: INTERNAL MEDICINE

## 2024-03-10 PROCEDURE — 86850 RBC ANTIBODY SCREEN: CPT

## 2024-03-10 PROCEDURE — 87040 BLOOD CULTURE FOR BACTERIA: CPT

## 2024-03-10 PROCEDURE — 700102 HCHG RX REV CODE 250 W/ 637 OVERRIDE(OP): Mod: JZ | Performed by: INTERNAL MEDICINE

## 2024-03-10 PROCEDURE — 99232 SBSQ HOSP IP/OBS MODERATE 35: CPT | Performed by: INTERNAL MEDICINE

## 2024-03-10 PROCEDURE — 770006 HCHG ROOM/CARE - MED/SURG/GYN SEMI*

## 2024-03-10 PROCEDURE — 160048 HCHG OR STATISTICAL LEVEL 1-5: Performed by: OBSTETRICS & GYNECOLOGY

## 2024-03-10 PROCEDURE — 160029 HCHG SURGERY MINUTES - 1ST 30 MINS LEVEL 4: Performed by: OBSTETRICS & GYNECOLOGY

## 2024-03-10 PROCEDURE — 700105 HCHG RX REV CODE 258: Performed by: HOSPITALIST

## 2024-03-10 PROCEDURE — 99233 SBSQ HOSP IP/OBS HIGH 50: CPT | Performed by: INTERNAL MEDICINE

## 2024-03-10 PROCEDURE — 700111 HCHG RX REV CODE 636 W/ 250 OVERRIDE (IP): Performed by: ANESTHESIOLOGY

## 2024-03-10 PROCEDURE — 700102 HCHG RX REV CODE 250 W/ 637 OVERRIDE(OP): Performed by: ANESTHESIOLOGY

## 2024-03-10 PROCEDURE — 88305 TISSUE EXAM BY PATHOLOGIST: CPT

## 2024-03-10 PROCEDURE — 76801 OB US < 14 WKS SINGLE FETUS: CPT

## 2024-03-10 PROCEDURE — 700105 HCHG RX REV CODE 258: Performed by: OBSTETRICS & GYNECOLOGY

## 2024-03-10 RX ORDER — KETOROLAC TROMETHAMINE 15 MG/ML
INJECTION, SOLUTION INTRAMUSCULAR; INTRAVENOUS PRN
Status: DISCONTINUED | OUTPATIENT
Start: 2024-03-10 | End: 2024-03-10 | Stop reason: SURG

## 2024-03-10 RX ORDER — DIPHENHYDRAMINE HYDROCHLORIDE 50 MG/ML
12.5 INJECTION INTRAMUSCULAR; INTRAVENOUS
Status: DISCONTINUED | OUTPATIENT
Start: 2024-03-10 | End: 2024-03-11 | Stop reason: HOSPADM

## 2024-03-10 RX ORDER — DOXYCYCLINE 100 MG/1
100 TABLET ORAL EVERY 12 HOURS
Status: DISCONTINUED | OUTPATIENT
Start: 2024-03-11 | End: 2024-03-11 | Stop reason: HOSPADM

## 2024-03-10 RX ORDER — OXYCODONE HCL 5 MG/5 ML
5 SOLUTION, ORAL ORAL
Status: COMPLETED | OUTPATIENT
Start: 2024-03-10 | End: 2024-03-10

## 2024-03-10 RX ORDER — MIDAZOLAM HYDROCHLORIDE 1 MG/ML
INJECTION INTRAMUSCULAR; INTRAVENOUS PRN
Status: DISCONTINUED | OUTPATIENT
Start: 2024-03-10 | End: 2024-03-10 | Stop reason: SURG

## 2024-03-10 RX ORDER — DEXAMETHASONE SODIUM PHOSPHATE 4 MG/ML
INJECTION, SOLUTION INTRA-ARTICULAR; INTRALESIONAL; INTRAMUSCULAR; INTRAVENOUS; SOFT TISSUE PRN
Status: DISCONTINUED | OUTPATIENT
Start: 2024-03-10 | End: 2024-03-10 | Stop reason: SURG

## 2024-03-10 RX ORDER — HALOPERIDOL 5 MG/ML
1 INJECTION INTRAMUSCULAR
Status: DISCONTINUED | OUTPATIENT
Start: 2024-03-10 | End: 2024-03-11 | Stop reason: HOSPADM

## 2024-03-10 RX ORDER — ONDANSETRON 2 MG/ML
INJECTION INTRAMUSCULAR; INTRAVENOUS PRN
Status: DISCONTINUED | OUTPATIENT
Start: 2024-03-10 | End: 2024-03-10 | Stop reason: SURG

## 2024-03-10 RX ORDER — DOXYCYCLINE 100 MG/1
100 TABLET ORAL EVERY 12 HOURS
Qty: 28 TABLET | Refills: 0 | Status: DISCONTINUED | OUTPATIENT
Start: 2024-03-10 | End: 2024-03-10

## 2024-03-10 RX ORDER — POTASSIUM CHLORIDE 20 MEQ/1
20 TABLET, EXTENDED RELEASE ORAL DAILY
Status: DISCONTINUED | OUTPATIENT
Start: 2024-03-11 | End: 2024-03-11 | Stop reason: HOSPADM

## 2024-03-10 RX ORDER — SODIUM CHLORIDE, SODIUM LACTATE, POTASSIUM CHLORIDE, CALCIUM CHLORIDE 600; 310; 30; 20 MG/100ML; MG/100ML; MG/100ML; MG/100ML
INJECTION, SOLUTION INTRAVENOUS CONTINUOUS
Status: DISCONTINUED | OUTPATIENT
Start: 2024-03-10 | End: 2024-03-11 | Stop reason: HOSPADM

## 2024-03-10 RX ORDER — LABETALOL HYDROCHLORIDE 5 MG/ML
5 INJECTION, SOLUTION INTRAVENOUS
Status: DISCONTINUED | OUTPATIENT
Start: 2024-03-10 | End: 2024-03-11 | Stop reason: HOSPADM

## 2024-03-10 RX ORDER — SODIUM CHLORIDE, SODIUM LACTATE, POTASSIUM CHLORIDE, CALCIUM CHLORIDE 600; 310; 30; 20 MG/100ML; MG/100ML; MG/100ML; MG/100ML
INJECTION, SOLUTION INTRAVENOUS
Status: DISCONTINUED | OUTPATIENT
Start: 2024-03-10 | End: 2024-03-10 | Stop reason: SURG

## 2024-03-10 RX ORDER — EPHEDRINE SULFATE 50 MG/ML
5 INJECTION, SOLUTION INTRAVENOUS
Status: DISCONTINUED | OUTPATIENT
Start: 2024-03-10 | End: 2024-03-11 | Stop reason: HOSPADM

## 2024-03-10 RX ORDER — OXYCODONE HCL 5 MG/5 ML
10 SOLUTION, ORAL ORAL
Status: COMPLETED | OUTPATIENT
Start: 2024-03-10 | End: 2024-03-10

## 2024-03-10 RX ORDER — CEFAZOLIN SODIUM 1 G/3ML
INJECTION, POWDER, FOR SOLUTION INTRAMUSCULAR; INTRAVENOUS PRN
Status: DISCONTINUED | OUTPATIENT
Start: 2024-03-10 | End: 2024-03-10 | Stop reason: SURG

## 2024-03-10 RX ORDER — HYDRALAZINE HYDROCHLORIDE 20 MG/ML
5 INJECTION INTRAMUSCULAR; INTRAVENOUS
Status: DISCONTINUED | OUTPATIENT
Start: 2024-03-10 | End: 2024-03-11 | Stop reason: HOSPADM

## 2024-03-10 RX ADMIN — OXYCODONE HYDROCHLORIDE 5 MG: 5 SOLUTION ORAL at 23:39

## 2024-03-10 RX ADMIN — POTASSIUM CHLORIDE 20 MEQ: 1500 TABLET, EXTENDED RELEASE ORAL at 05:05

## 2024-03-10 RX ADMIN — ONDANSETRON 8 MG: 2 INJECTION INTRAMUSCULAR; INTRAVENOUS at 22:56

## 2024-03-10 RX ADMIN — ACETAMINOPHEN 650 MG: 325 TABLET, FILM COATED ORAL at 09:52

## 2024-03-10 RX ADMIN — CEFAZOLIN 2 G: 1 INJECTION, POWDER, FOR SOLUTION INTRAMUSCULAR; INTRAVENOUS at 22:42

## 2024-03-10 RX ADMIN — DOXYCYCLINE 100 MG: 100 INJECTION, POWDER, LYOPHILIZED, FOR SOLUTION INTRAVENOUS at 17:53

## 2024-03-10 RX ADMIN — METRONIDAZOLE 500 MG: 500 TABLET ORAL at 17:51

## 2024-03-10 RX ADMIN — FENTANYL CITRATE 50 MCG: 50 INJECTION, SOLUTION INTRAMUSCULAR; INTRAVENOUS at 22:57

## 2024-03-10 RX ADMIN — KETOROLAC TROMETHAMINE 15 MG: 15 INJECTION, SOLUTION INTRAMUSCULAR; INTRAVENOUS at 22:56

## 2024-03-10 RX ADMIN — FENTANYL CITRATE 50 MCG: 50 INJECTION, SOLUTION INTRAMUSCULAR; INTRAVENOUS at 22:46

## 2024-03-10 RX ADMIN — MIDAZOLAM HYDROCHLORIDE 2 MG: 1 INJECTION, SOLUTION INTRAMUSCULAR; INTRAVENOUS at 22:36

## 2024-03-10 RX ADMIN — SODIUM CHLORIDE, POTASSIUM CHLORIDE, SODIUM LACTATE AND CALCIUM CHLORIDE: 600; 310; 30; 20 INJECTION, SOLUTION INTRAVENOUS at 22:36

## 2024-03-10 RX ADMIN — SODIUM CHLORIDE, POTASSIUM CHLORIDE, SODIUM LACTATE AND CALCIUM CHLORIDE: 600; 310; 30; 20 INJECTION, SOLUTION INTRAVENOUS at 15:07

## 2024-03-10 RX ADMIN — PROPOFOL 150 MG: 10 INJECTION, EMULSION INTRAVENOUS at 22:41

## 2024-03-10 RX ADMIN — DEXAMETHASONE SODIUM PHOSPHATE 10 MG: 4 INJECTION INTRA-ARTICULAR; INTRALESIONAL; INTRAMUSCULAR; INTRAVENOUS; SOFT TISSUE at 22:43

## 2024-03-10 RX ADMIN — METRONIDAZOLE 500 MG: 500 TABLET ORAL at 05:05

## 2024-03-10 RX ADMIN — VANCOMYCIN HYDROCHLORIDE 1000 MG: 5 INJECTION, POWDER, LYOPHILIZED, FOR SOLUTION INTRAVENOUS at 09:56

## 2024-03-10 ASSESSMENT — COGNITIVE AND FUNCTIONAL STATUS - GENERAL
SUGGESTED CMS G CODE MODIFIER DAILY ACTIVITY: CH
DAILY ACTIVITIY SCORE: 24
MOBILITY SCORE: 24
SUGGESTED CMS G CODE MODIFIER MOBILITY: CH

## 2024-03-10 ASSESSMENT — PATIENT HEALTH QUESTIONNAIRE - PHQ9
2. FEELING DOWN, DEPRESSED, IRRITABLE, OR HOPELESS: NOT AT ALL
1. LITTLE INTEREST OR PLEASURE IN DOING THINGS: NOT AT ALL
2. FEELING DOWN, DEPRESSED, IRRITABLE, OR HOPELESS: NOT AT ALL
SUM OF ALL RESPONSES TO PHQ9 QUESTIONS 1 AND 2: 0
SUM OF ALL RESPONSES TO PHQ9 QUESTIONS 1 AND 2: 0
1. LITTLE INTEREST OR PLEASURE IN DOING THINGS: NOT AT ALL

## 2024-03-10 ASSESSMENT — ENCOUNTER SYMPTOMS
CHILLS: 0
VOMITING: 0
ABDOMINAL PAIN: 1
FEVER: 0
NAUSEA: 0

## 2024-03-10 ASSESSMENT — PAIN DESCRIPTION - PAIN TYPE
TYPE: ACUTE PAIN

## 2024-03-10 ASSESSMENT — PAIN SCALES - GENERAL: PAIN_LEVEL: 0

## 2024-03-10 NOTE — CARE PLAN
The patient is Watcher - Medium risk of patient condition declining or worsening    Shift Goals  Clinical Goals: Pt will maintain skin integrety throughout shift  Patient Goals: Rest    Progress made toward(s) clinical / shift goals:  Pt turned in bed and ambulated throughout shift. Pts skin is clean, dry, and intact.    Patient is not progressing towards the following goals: NA

## 2024-03-10 NOTE — CONSULTS
DATE OF SERVICE:  2024     GYNECOLOGIC CONSULTATION     I was asked by Dr. Cerrato to come and see this patient.     The patient is a 33-year-old female who is a  1, para 0, who initially   came to the hospital on I believe  after she had been bleeding for 3   weeks.  She states that 4 weeks ago, she had her first positive pregnancy test   and about a week later, she had what she thought was a miscarriage with   passage of large blood clots.  She presented to the hospital on the  with   complaints of some continued bleeding and some discharge and she was   discharged home with oral Flagyl and during that time her laboratory   evaluation had showed a white blood cell count of 5.1, her hemoglobin was   11.7, platelets were 155.  Her chemistry panel was completely within normal   limits, she did have an Rh of positive status.  Her beta quant was 553.  The   patient had had blood cultures drawn on that day and when they returned was   called by another emergency room physician here from [facility] stating that they   were positive and they wanted her to come back into the emergency room   department.  When she came back in, she was having few episodes of chills and   fever but was otherwise awake, alert and oriented.  She had a CT scan done of   the abdomen as well as an ultrasound.  Ultrasound was performed on 3/7/2024.    Uterus was of normal size of 6.7x5.3 cm.  No intrauterine pregnancy was noted.    There was some heterogeneous material that could possibly be retained   products of conception or hemorrhage and no adnexal masses and no free fluid   were noted.  Unfortunately at that time, GYN was not consulted, so patient has   been re-admitted back to the hospital.  At this time, she probably has an   endometritis and has positive cultures growing specific bacteria and ID has   been consulted.  One is Prevotella bivia.  She is on IV antibiotics.  I went   in and I spoke with the patient who gave  me the same story as stated above.    Since she has been back in the hospital, she says that she has passed very   small clots of dark blood.  She is awake, alert and oriented x3.  She has been   afebrile during her entire hospital course.  She had a repeat beta quant that   was performed yesterday morning and was 299.  The patient is without any   complaints at this time.  I spoke with the doctor that is on-call. I performed   a pelvic exam on the patient by myself in the bed and she has a small   posterior uterus and I had a very scant amount of dark blood on my glove.  At   this time, I was speaking with the hospitalist on-call and had ordered 800 mcg   of Cytotec to take p.o. and the patient knows that she may have some cramping   with a little bit more clotting.  She will continue her inpatient management   as well as the IV antibiotics that she has been started on.  She knows that   she can have Motrin or if needed narcotics for pain relief.  I have also   advised and put on the chart to check another beta hCG quant tomorrow with   whatever a.m. labs have been drawn.  At this time, I believe the patient has   an endometritis and has been adequately treated with IV antibiotics.  My hope   is that with the Cytotec, the uterus will expel whatever remaining clot is in   utero.  As a last resort, if repeat ultrasound shows still retained any   products or clot, a D and C may need to be considered.  The patient is aware   of all of this and her questions have been answered.     Thank you very much.        ______________________________  Corinne E. Capurro, MD [facility]    DD:  03/09/2024 15:18  DT:  03/09/2024 18:09    Job#:  [id number]

## 2024-03-10 NOTE — PROGRESS NOTES
GYN Progress Note:    Subjective:   Pt reports feeling well, has had some bleeding since the cytotec yesterday but not much.  Small clots and minimal since.  Denies fevers/chills, no other concerns.    24hr VS:  Temp:  [36.1 °C (97 °F)-36.9 °C (98.4 °F)] 36.9 °C (98.4 °F)  Pulse:  [68-78] 68  Resp:  [16-17] 17  BP: ()/(58-75) 99/68  SpO2:  [96 %-97 %] 96 %    gen: AAO, NAD  Abd: soft, ND, nontender  Ext: NT, no edema bilaterally    Meds:   Current Facility-Administered Medications:     doxycycline monohydrate (Adoxa) tablet 100 mg, 100 mg, Oral, Q12HRS, Barb Garvey M.D.    lactated ringers infusion, , Intravenous, Continuous, Lashawn Booker M.D.    metroNIDAZOLE (Flagyl) tablet 500 mg, 500 mg, Oral, Q12HRS, Barb Garvey M.D., 500 mg at 03/10/24 0505    Respiratory Therapy Consult, , Nebulization, Continuous RT, Alfredo Marie M.D.    acetaminophen (Tylenol) tablet 650 mg, 650 mg, Oral, Q6HRS PRN, Alfredo Marie M.D., 650 mg at 03/10/24 0952    ondansetron (Zofran) syringe/vial injection 4 mg, 4 mg, Intravenous, Q4HRS PRN, Alfredo Marie M.D.    ondansetron (Zofran ODT) dispertab 4 mg, 4 mg, Oral, Q4HRS PRN, Alfredo Marie M.D.    promethazine (Phenergan) tablet 12.5-25 mg, 12.5-25 mg, Oral, Q4HRS PRN, Alfredo Marie M.D.    promethazine (Phenergan) suppository 12.5-25 mg, 12.5-25 mg, Rectal, Q4HRS PRN, Alfredo Marie M.D.    prochlorperazine (Compazine) injection 5-10 mg, 5-10 mg, Intravenous, Q4HRS PRN, Alfredo Marie M.D.      Lab:    Latest Reference Range & Units 03/09/24 03:53 03/10/24 00:40   WBC 4.8 - 10.8 K/uL 6.8 4.8   RBC 4.20 - 5.40 M/uL 3.58 (L) 3.61 (L)   Hemoglobin 12.0 - 16.0 g/dL 11.0 (L) 11.0 (L)   Hematocrit 37.0 - 47.0 % 31.7 (L) 32.9 (L)   MCV 81.4 - 97.8 fL 88.5 91.1   MCH 27.0 - 33.0 pg 30.7 30.5   MCHC 32.2 - 35.5 g/dL 34.7 33.4   RDW 35.9 - 50.0 fL 39.2 41.1   Platelet Count 164 - 446 K/uL 138 (L) 185   MPV 9.0 - 12.9 fL 11.2 11.0   Neutrophils-Polys 44.00 - 72.00 % 70.60  44.40   Neutrophils (Absolute) 1.82 - 7.42 K/uL 4.80 2.13   Lymphocytes 22.00 - 41.00 % 19.10 (L) 42.60 (H)   Lymphs (Absolute) 1.00 - 4.80 K/uL 1.30 2.04   Monocytes 0.00 - 13.40 % 9.10 9.60   Monos (Absolute) 0.00 - 0.85 K/uL 0.62 0.46   Eosinophils 0.00 - 6.90 % 0.60 1.70   Eos (Absolute) 0.00 - 0.51 K/uL 0.04 0.08   Basophils 0.00 - 1.80 % 0.30 1.70   Baso (Absolute) 0.00 - 0.12 K/uL 0.02 0.08   Immature Granulocytes 0.00 - 0.90 % 0.30    Immature Granulocytes (abs) 0.00 - 0.11 K/uL 0.02    Nucleated RBC 0.00 - 0.20 /100 WBC 0.00 0.00   NRBC (Absolute) K/uL 0.00 0.00   Plt Estimation   Normal   RBC Morphology   Present   Anisocytosis   1+   Microcytosis   1+   Poikilocytosis   1+   Ovalocytes   1+   Peripheral Smear Review   see below   Manual Diff Status   PERFORMED   (L): Data is abnormally low  (H): Data is abnormally high     Latest Reference Range & Units 24 08:00 24 20:16 03/10/24 05:27   Bhcg 0.0 - 5.0 mIU/mL 553.0 (H) 299.0 (H) 181.0 (H)   (H): Data is abnormally high      TVUS:  FINDINGS:  There is complex material within the endometrial cavity measuring 2.4 x 1.5 x 1 cm in size. This appears to be composed of endometrial thickening and some hypoechoic areas. A defined gestational sac is not seen. Right ovary measures 2.0 x 1.7 x 1.1 cm in   size. Left ovary measures 3 x 2.6 x 1.4 cm in size. Ovarian arterial flow is documented bilaterally using color and spectral Doppler analysis.. There is no evidence of free fluid.     IMPRESSION:     1.  Complex material seen within the endometrial cavity measuring 2.0 x 1.7 x 1.1 cm in size. This is characterized by thickening of the endometrial stripe with some hypoechoic areas seen. A definite intrauterine gestational sac is not seen.     2.  No evidence of adnexal mass.      A/P: 33 y.o.  with retained POCs, bacteremia  Discussed repeat US s/p cytotec still with suspected POCs; hCGs downtrending appropriately.  Discussed option for additional  cytotec vs D&C.  Recommend D&C given concern that POCs/uterus is the source of her infection, if without that typically would consider additional cytotec and f/u outpt but I am concerned if we do this her infection could recede.  Pt clinically without signs of infection at this time and well, recommend we proceed with D&C to remove retained tissue now.  Cont PO antibiotics as per ID for bacteremia.  Pt amenable.  NPO now, will plan to proceed this evening.  I discussed w/ pt risks of surgery including pain, bleeding, infection, risk of uterine perforation and damage to surrounding structures including bowel/bladder/nerves/blood vessels.  Pt confirms she is accepting of blood transfusion in case of emergency.  Reviewed risks of transfusion including transfusion reaction (fever, damage to heart/lungs/kidneys), risk of exposure to infectious disease including HIV and hepatitis.  All questions answered.      Anticipate d/c tomorrow as per primary team if doing well to cont PO abx (doxy/flagyl) per ID.        - ppx: SCDs  - NPO now, will start maintenance IVF  - COD ordered      Lashawn Booker MD  [facility], Women's Health

## 2024-03-10 NOTE — CARE PLAN
The patient is Watcher - Medium risk of patient condition declining or worsening    Shift Goals  Clinical Goals: Pt will remain free from falls throughout shift  Patient Goals: Rest and go home    Progress made toward(s) clinical / shift goals:  Pt has remained safe and free from falls throughout shift. Pts needs have been met by hourly rounding.     Patient is not progressing towards the following goals: NA

## 2024-03-10 NOTE — PROGRESS NOTES
Blood cult with GPR from 3/8-most likely Prevotella, other anerobes possible  Repeat blood cult today

## 2024-03-10 NOTE — PROGRESS NOTES
Infectious Disease Progress Note    Author: Barb Garvey M.D. Date & Time of service: 3/10/2024  1:09 PM    Chief Complaint:  Bacteremia  Endometritis after miscarriage    Interval History:  33 y.o. female  admitted 3/8/2024 for above  3/10 AF WBC  4.8 feeling better overall-OB/GYN eval much appreciated Passed clots after medication  Labs Reviewed and Medications Reviewed.    Review of Systems:  Review of Systems   Constitutional:  Negative for fever.   Gastrointestinal:  Positive for abdominal pain. Negative for vomiting.        Mild/mod cramping with medication as expected       Hemodynamics:  Temp (24hrs), Av.4 °C (97.5 °F), Min:36.1 °C (97 °F), Max:36.9 °C (98.4 °F)  Temperature: 36.9 °C (98.4 °F)  Pulse  Av.9  Min: 68  Max: 93   Blood Pressure: 99/68       Physical Exam:  Physical Exam  Vitals and nursing note reviewed.   Constitutional:       General: She is not in acute distress.     Appearance: She is not ill-appearing, toxic-appearing or diaphoretic.   Eyes:      General: No scleral icterus.     Extraocular Movements: Extraocular movements intact.      Pupils: Pupils are equal, round, and reactive to light.   Cardiovascular:      Rate and Rhythm: Normal rate.   Pulmonary:      Effort: Pulmonary effort is normal. No respiratory distress.      Breath sounds: No stridor.   Abdominal:      General: There is no distension.   Neurological:      General: No focal deficit present.      Mental Status: She is alert and oriented to person, place, and time.   Psychiatric:         Mood and Affect: Mood normal.         Behavior: Behavior normal.         Meds:    Current Facility-Administered Medications:     doxycycline monohydrate    metroNIDAZOLE    Respiratory Therapy Consult    acetaminophen    ondansetron    ondansetron    promethazine    promethazine    prochlorperazine    Labs:  Recent Labs     24  2016 24  0353 03/10/24  0040   WBC 5.1 6.8 4.8   RBC 3.81* 3.58* 3.61*   HEMOGLOBIN 11.7*  11.0* 11.0*   HEMATOCRIT 33.6* 31.7* 32.9*   MCV 88.2 88.5 91.1   MCH 30.7 30.7 30.5   RDW 39.6 39.2 41.1   PLATELETCT 155* 138* 185   MPV 11.4 11.2 11.0   NEUTSPOLYS 64.90 70.60 44.40   LYMPHOCYTES 23.20 19.10* 42.60*   MONOCYTES 10.30 9.10 9.60   EOSINOPHILS 0.80 0.60 1.70   BASOPHILS 0.60 0.30 1.70   RBCMORPHOLO  --   --  Present     Recent Labs     24  0353   SODIUM 137 138   POTASSIUM 3.1* 3.4*   CHLORIDE 98 102   CO2 23 23   GLUCOSE 122* 93   BUN 12 9     Recent Labs     24  0353   ALBUMIN 4.1 3.6   TBILIRUBIN 0.3 0.2   ALKPHOSPHAT 102* 83   TOTPROTEIN 7.6 6.6   ALTSGPT 21 15   ASTSGOT 28 23   CREATININE 0.81 0.56       Imaging:  US-OB 1ST TRIMESTER WITH TRANSVAGINAL (COMBO)    Result Date: 3/10/2024  3/10/2024 8:41 AM HISTORY/REASON FOR EXAM:  Incomplete . TECHNIQUE/EXAM DESCRIPTION: Real-time limited OB transvaginal ultrasound was performed with grey-scale, color and duplex Doppler imaging. COMPARISON:  None. FINDINGS: There is complex material within the endometrial cavity measuring 2.4 x 1.5 x 1 cm in size. This appears to be composed of endometrial thickening and some hypoechoic areas. A defined gestational sac is not seen. Right ovary measures 2.0 x 1.7 x 1.1 cm in  size. Left ovary measures 3 x 2.6 x 1.4 cm in size. Ovarian arterial flow is documented bilaterally using color and spectral Doppler analysis.. There is no evidence of free fluid.     1.  Complex material seen within the endometrial cavity measuring 2.0 x 1.7 x 1.1 cm in size. This is characterized by thickening of the endometrial stripe with some hypoechoic areas seen. A definite intrauterine gestational sac is not seen. 2.  No evidence of adnexal mass.    DX-CHEST-LIMITED (1 VIEW)    Result Date: 3/8/2024  3/8/2024 10:28 PM HISTORY/REASON FOR EXAM:  Shortness of Breath TECHNIQUE/EXAM DESCRIPTION AND NUMBER OF VIEWS: Single portable view of the chest. COMPARISON: 9/10/2020 FINDINGS:  Cardiomediastinal contour is within normal limits. No focal pulmonary consolidation. No pleural fluid collection or pneumothorax. No major bony abnormality is seen. Artifact from clothing noted.     No acute cardiopulmonary disease.    CT-ABDOMEN-PELVIS WITH    Result Date: 3/7/2024  3/7/2024 11:13 AM HISTORY/REASON FOR EXAM:  Possible pelvic thrombophlebitis. Nausea and vomiting chills. Possible hemorrhage or retained products of conception uterus on ultrasound earlier today. TECHNIQUE/EXAM DESCRIPTION:   CT scan of the abdomen and pelvis with contrast. Contrast-enhanced helical scanning was obtained from the diaphragmatic domes through the pubic symphysis following the bolus administration of nonionic contrast without complication. 100 mL of Omnipaque 350 nonionic contrast was administered without complication. Low dose optimization technique was utilized for this CT exam including automated exposure control and adjustment of the mA and/or kV according to patient size. COMPARISON: Ultrasound 3/7/2024, CT 9/10/2020 FINDINGS: Lower Chest: There is minimal bibasilar dependent atelectasis. Liver: Simple inferior right lobe hepatic cyst is again noted. Spleen: Unremarkable. Pancreas: Unremarkable. Gallbladder: No calcified stones. Biliary: Nondilated. Adrenal glands: Normal. Kidneys: Unremarkable without hydronephrosis. Lymph nodes: No adenopathy. Vasculature: Unremarkable. No venous thrombosis identified. Bowel: No obstruction or acute inflammation. Peritoneum: There are postsurgical changes in the right lower quadrant again noted. No ascites. Pelvis: There is heterogeneous hyperdense material in the central endometrial region of the uterus with some surrounding fluid. No adnexal mass. The bladder is unremarkable. Musculoskeletal: No acute or destructive process.     1.  There is hyperdense material in the central endometrial region of the uterus which could be blood products or retained products of conception based  on the clinical history. 2.  No adnexal mass. 3.  No venous thrombosis is identified.    US-OB TRANSVAGINAL ONLY    Result Date: 3/7/2024  3/7/2024 8:13 AM HISTORY/REASON FOR EXAM:  Vaginal bleeding TECHNIQUE/EXAM DESCRIPTION: First trimester transabdominal ultrasound. Real-time OB transabdominal pelvis ultrasound was performed with grey-scale, color and duplex Doppler imaging. COMPARISON: FINDINGS: UTERUS: Uterus is anteverted and measures 6.7 x 5.3 x 3.6 cm. There is no intrauterine pregnancy as would be identified by gestational sac containing yolk sac and fetal pole. There is complex heterogeneous material within the endometrial canal which could represent hemorrhage and/or retained products of conception.. OVARIES: The right ovary is not visualized, obscured by bowel gas. The left ovary measures 2.79 cm x 1.20 cm x 1.54 cm. Doppler examination of the left ovary shows normal waveforms. No adnexal masses are seen. Ectopic pregnancy is not seen but cannot be excluded on this exam. No free fluid in the pelvis.     1.  No evidence of intrauterine pregnancy. 2.  Complex material within the lower uterine segment and cervix could represent hemorrhage and/or retained products of conception. 3.  Right ovary is not visualized.      Micro:  Results       Procedure Component Value Units Date/Time    Urine Culture (New) [[id number]] Collected: 03/08/24 2041    Order Status: Completed Specimen: Urine Updated: 03/09/24 2223     Significant Indicator NEG     Source UR     Site -     Culture Result Culture in progress.    Narrative:      Indication for culture:->Emergency Room Patient  Indication for culture:->Emergency Room Patient    Blood Culture - Draw one from central line and one from peripheral site [[id number]] Collected: 03/08/24 2054    Order Status: Completed Specimen: Blood from Line Updated: 03/09/24 0654     Significant Indicator NEG     Source BLD     Site PERIPHERAL     Culture Result No Growth  Note: Blood  cultures are incubated for 5 days and  are monitored continuously.Positive blood cultures  are called to the RN and reported as soon as  they are identified.      Narrative:      Blood Cultures X2. Blood Cultures X2. Draw one blood culture  from central line and one blood culture peripherally. If no  line present, draw blood cultures times two peripherally from  different sites.  Left AC    Blood Culture - Draw one from central line and one from peripheral site [[id number]] Collected: 03/08/24 2040    Order Status: Completed Specimen: Blood from Peripheral Updated: 03/09/24 0654     Significant Indicator NEG     Source BLD     Site PERIPHERAL     Culture Result No Growth  Note: Blood cultures are incubated for 5 days and  are monitored continuously.Positive blood cultures  are called to the RN and reported as soon as  they are identified.      Narrative:      Blood Cultures X2. Draw one blood culture from central line  and one blood culture peripherally. If no line present, draw  blood cultures times two peripherally from different sites.  Left Wrist    MRSA By PCR (Amp) [[id number]] Collected: 03/08/24 2136    Order Status: Completed Specimen: Respirate from Nares Updated: 03/08/24 2359     MRSA by PCR Negative    Urinalysis [[id number]]  (Abnormal) Collected: 03/08/24 2041    Order Status: Completed Specimen: Urine Updated: 03/08/24 2125     Color Yellow     Character Clear     Specific Gravity 1.011     Ph 6.5     Glucose Negative mg/dL      Ketones Negative mg/dL      Protein Negative mg/dL      Bilirubin Negative     Urobilinogen, Urine 1.0     Nitrite Negative     Leukocyte Esterase Moderate     Occult Blood Moderate     Micro Urine Req Microscopic    Narrative:      Indication for culture:->Emergency Room Patient            Assessment:  Active Hospital Problems    Diagnosis     *Bacteremia [R78.81]     Anemia [D64.9]     Hypokalemia [E87.6]     Miscarriage [O03.9]     Essential hypertension [I10]      N/V with  flagyl PO as outpatient but tolerated oral dose this am  Gardnerella vaginitis  Recent miscarriage with RPC-decreasing bHCG  Endometritis  3/7 +blood culture-with mult anaerobes. Actino in 1/2, Solobacterium 1/2(very rare), Prevotella 2/2  Repeat blood cult 3/8 neg to date          PLAN:   Continue to follow blood cultures  DC vanco and start doxycycline for 14 days-  Side effects discussed  Continue flagyl for 14 day course  OB/Gyn management appreciated    DW Pharm  Per patient possible discharge today or tomorrow  FU ID clinic 2 weeks after discharge  OK to see Harmony LEE

## 2024-03-10 NOTE — CARE PLAN
The patient is Stable - Low risk of patient condition declining or worsening    Shift Goals  Clinical Goals: monitor signs infection and expusion of retained pregnancy  Patient Goals: rest    Progress made toward(s) clinical / shift goals:  Patient alert and oriented, resting comfortably in bed.  All meds given per Mar and all belongings within reach.  Respirations even and unlabored.  No reports of pain.     Patient is not progressing towards the following goals:

## 2024-03-11 ENCOUNTER — PHARMACY VISIT (OUTPATIENT)
Dept: PHARMACY | Facility: MEDICAL CENTER | Age: 34
End: 2024-03-11
Payer: COMMERCIAL

## 2024-03-11 VITALS
OXYGEN SATURATION: 94 % | BODY MASS INDEX: 23.53 KG/M2 | TEMPERATURE: 97.9 F | SYSTOLIC BLOOD PRESSURE: 110 MMHG | HEART RATE: 83 BPM | RESPIRATION RATE: 16 BRPM | DIASTOLIC BLOOD PRESSURE: 70 MMHG | WEIGHT: 154.76 LBS

## 2024-03-11 LAB
BACTERIA BLD CULT: ABNORMAL
BACTERIA BLD CULT: ABNORMAL
BASOPHILS # BLD AUTO: 0.4 % (ref 0–1.8)
BASOPHILS # BLD: 0.03 K/UL (ref 0–0.12)
EOSINOPHIL # BLD AUTO: 0 K/UL (ref 0–0.51)
EOSINOPHIL NFR BLD: 0 % (ref 0–6.9)
ERYTHROCYTE [DISTWIDTH] IN BLOOD BY AUTOMATED COUNT: 39.5 FL (ref 35.9–50)
HCT VFR BLD AUTO: 36.4 % (ref 37–47)
HGB BLD-MCNC: 12.4 G/DL (ref 12–16)
IMM GRANULOCYTES # BLD AUTO: 0.08 K/UL (ref 0–0.11)
IMM GRANULOCYTES NFR BLD AUTO: 1 % (ref 0–0.9)
LYMPHOCYTES # BLD AUTO: 1.01 K/UL (ref 1–4.8)
LYMPHOCYTES NFR BLD: 12.4 % (ref 22–41)
MCH RBC QN AUTO: 30.1 PG (ref 27–33)
MCHC RBC AUTO-ENTMCNC: 34.1 G/DL (ref 32.2–35.5)
MCV RBC AUTO: 88.3 FL (ref 81.4–97.8)
MONOCYTES # BLD AUTO: 0.11 K/UL (ref 0–0.85)
MONOCYTES NFR BLD AUTO: 1.3 % (ref 0–13.4)
NEUTROPHILS # BLD AUTO: 6.92 K/UL (ref 1.82–7.42)
NEUTROPHILS NFR BLD: 84.9 % (ref 44–72)
NRBC # BLD AUTO: 0 K/UL
NRBC BLD-RTO: 0 /100 WBC (ref 0–0.2)
PATHOLOGY CONSULT NOTE: NORMAL
PLATELET # BLD AUTO: 257 K/UL (ref 164–446)
PMV BLD AUTO: 10.4 FL (ref 9–12.9)
RBC # BLD AUTO: 4.12 M/UL (ref 4.2–5.4)
SIGNIFICANT IND 70042: ABNORMAL
SITE SITE: ABNORMAL
SOURCE SOURCE: ABNORMAL
WBC # BLD AUTO: 8.2 K/UL (ref 4.8–10.8)

## 2024-03-11 PROCEDURE — 700102 HCHG RX REV CODE 250 W/ 637 OVERRIDE(OP): Performed by: HOSPITALIST

## 2024-03-11 PROCEDURE — A9270 NON-COVERED ITEM OR SERVICE: HCPCS | Performed by: INTERNAL MEDICINE

## 2024-03-11 PROCEDURE — 99232 SBSQ HOSP IP/OBS MODERATE 35: CPT | Performed by: INTERNAL MEDICINE

## 2024-03-11 PROCEDURE — A9270 NON-COVERED ITEM OR SERVICE: HCPCS | Mod: JZ | Performed by: INTERNAL MEDICINE

## 2024-03-11 PROCEDURE — 85025 COMPLETE CBC W/AUTO DIFF WBC: CPT

## 2024-03-11 PROCEDURE — 700102 HCHG RX REV CODE 250 W/ 637 OVERRIDE(OP): Mod: JZ | Performed by: INTERNAL MEDICINE

## 2024-03-11 PROCEDURE — 99239 HOSP IP/OBS DSCHRG MGMT >30: CPT | Performed by: INTERNAL MEDICINE

## 2024-03-11 PROCEDURE — A9270 NON-COVERED ITEM OR SERVICE: HCPCS | Performed by: HOSPITALIST

## 2024-03-11 PROCEDURE — 700102 HCHG RX REV CODE 250 W/ 637 OVERRIDE(OP): Performed by: INTERNAL MEDICINE

## 2024-03-11 PROCEDURE — RXMED WILLOW AMBULATORY MEDICATION CHARGE: Performed by: INTERNAL MEDICINE

## 2024-03-11 RX ORDER — DOXYCYCLINE 100 MG/1
100 TABLET ORAL EVERY 12 HOURS
Qty: 22 TABLET | Refills: 0 | Status: ACTIVE | OUTPATIENT
Start: 2024-03-11 | End: 2024-03-22

## 2024-03-11 RX ORDER — ACETAMINOPHEN 325 MG/1
650 TABLET ORAL EVERY 6 HOURS PRN
Status: DISCONTINUED | OUTPATIENT
Start: 2024-03-11 | End: 2024-03-11 | Stop reason: HOSPADM

## 2024-03-11 RX ORDER — METRONIDAZOLE 500 MG/1
500 TABLET ORAL EVERY 12 HOURS
Qty: 22 TABLET | Refills: 0 | Status: ACTIVE | OUTPATIENT
Start: 2024-03-11 | End: 2024-03-22

## 2024-03-11 RX ORDER — TRAMADOL HYDROCHLORIDE 50 MG/1
50 TABLET ORAL EVERY 6 HOURS PRN
Status: DISCONTINUED | OUTPATIENT
Start: 2024-03-11 | End: 2024-03-11 | Stop reason: HOSPADM

## 2024-03-11 RX ADMIN — POTASSIUM CHLORIDE 20 MEQ: 1500 TABLET, EXTENDED RELEASE ORAL at 05:04

## 2024-03-11 RX ADMIN — TRAMADOL HYDROCHLORIDE 50 MG: 50 TABLET ORAL at 09:28

## 2024-03-11 RX ADMIN — DOXYCYCLINE 100 MG: 100 TABLET, FILM COATED ORAL at 05:04

## 2024-03-11 RX ADMIN — ACETAMINOPHEN 650 MG: 325 TABLET, FILM COATED ORAL at 05:04

## 2024-03-11 RX ADMIN — METRONIDAZOLE 500 MG: 500 TABLET ORAL at 05:04

## 2024-03-11 ASSESSMENT — COGNITIVE AND FUNCTIONAL STATUS - GENERAL
SUGGESTED CMS G CODE MODIFIER DAILY ACTIVITY: CH
SUGGESTED CMS G CODE MODIFIER MOBILITY: CH
DAILY ACTIVITIY SCORE: 24
MOBILITY SCORE: 24

## 2024-03-11 ASSESSMENT — PATIENT HEALTH QUESTIONNAIRE - PHQ9
2. FEELING DOWN, DEPRESSED, IRRITABLE, OR HOPELESS: NOT AT ALL
1. LITTLE INTEREST OR PLEASURE IN DOING THINGS: NOT AT ALL
SUM OF ALL RESPONSES TO PHQ9 QUESTIONS 1 AND 2: 0

## 2024-03-11 ASSESSMENT — PAIN DESCRIPTION - PAIN TYPE
TYPE: ACUTE PAIN
TYPE: ACUTE PAIN

## 2024-03-11 ASSESSMENT — ENCOUNTER SYMPTOMS
FEVER: 0
VOMITING: 0
ROS GI COMMENTS: IMPROVED
ABDOMINAL PAIN: 1

## 2024-03-11 NOTE — OR NURSING
Pt surgery delayed for at least 2 hrs.  Pt requesting to go back to her room.  Pt placed on transport.  Report given to Shanna MCGUIRE.  Pt returning with her belly button ring in a specimen cup with label.  Will call for pt when surgery is rescheduled. Pt to remain NPO.

## 2024-03-11 NOTE — CARE PLAN
The patient is Stable - Low risk of patient condition declining or worsening    Shift Goals  Clinical Goals: Abx, monitor signs infection  Patient Goals: rest    Progress made toward(s) clinical / shift goals:  Patient alert and oriented, resting comfortably in bed.  All meds given per Mar and all belongings within reach.  LR running @ 125 via right wrist 20G. Respirations even and unlabored.  Patient having her surgery this evening.     Patient is not progressing towards the following goals:

## 2024-03-11 NOTE — PROGRESS NOTES
Infectious Disease Progress Note    Author: Barb Garvey M.D. Date & Time of service: 3/11/2024  11:20 AM    Chief Complaint:  Bacteremia  Endometritis after miscarriage    Interval History:  33 y.o. female  admitted 3/8/2024 for above  3/10 AF WBC  4.8 feeling better overall-OB/GYN eval much appreciated Passed clots after medication  3/11 AF WBC 8.2 underwent D and C-had some pain after procedure-relieved by Tramadol. Tolerating PO antibiotics  Labs Reviewed and Medications Reviewed.    Review of Systems:  Review of Systems   Constitutional:  Negative for fever.   Gastrointestinal:  Positive for abdominal pain. Negative for vomiting.        Improved   All other systems reviewed and are negative.      Hemodynamics:  Temp (24hrs), Av.3 °C (97.3 °F), Min:35.9 °C (96.7 °F), Max:36.9 °C (98.4 °F)  Temperature: 36.9 °C (98.4 °F)  Pulse  Av.4  Min: 56  Max: 93   Blood Pressure: 117/80       Physical Exam:  Physical Exam  Vitals and nursing note reviewed.   Constitutional:       General: She is not in acute distress.     Appearance: She is not ill-appearing, toxic-appearing or diaphoretic.   Eyes:      General: No scleral icterus.     Extraocular Movements: Extraocular movements intact.      Pupils: Pupils are equal, round, and reactive to light.   Cardiovascular:      Rate and Rhythm: Normal rate.   Pulmonary:      Effort: Pulmonary effort is normal. No respiratory distress.      Breath sounds: No stridor.   Abdominal:      General: There is no distension.      Palpations: Abdomen is soft.      Tenderness: There is no abdominal tenderness. There is no guarding or rebound.   Neurological:      General: No focal deficit present.      Mental Status: She is alert and oriented to person, place, and time.   Psychiatric:         Mood and Affect: Mood normal.         Behavior: Behavior normal.         Meds:    Current Facility-Administered Medications:     traMADol    acetaminophen    LR    doxycycline  monohydrate    potassium chloride SA    metroNIDAZOLE    Respiratory Therapy Consult    ondansetron    ondansetron    promethazine    promethazine    prochlorperazine    Labs:  Recent Labs     24  0353 03/10/24  0040 24  0927   WBC 6.8 4.8 8.2   RBC 3.58* 3.61* 4.12*   HEMOGLOBIN 11.0* 11.0* 12.4   HEMATOCRIT 31.7* 32.9* 36.4*   MCV 88.5 91.1 88.3   MCH 30.7 30.5 30.1   RDW 39.2 41.1 39.5   PLATELETCT 138* 185 257   MPV 11.2 11.0 10.4   NEUTSPOLYS 70.60 44.40 84.90*   LYMPHOCYTES 19.10* 42.60* 12.40*   MONOCYTES 9.10 9.60 1.30   EOSINOPHILS 0.60 1.70 0.00   BASOPHILS 0.30 1.70 0.40   RBCMORPHOLO  --  Present  --      Recent Labs     24  0353   SODIUM 137 138   POTASSIUM 3.1* 3.4*   CHLORIDE 98 102   CO2 23 23   GLUCOSE 122* 93   BUN 12 9     Recent Labs     24  0353   ALBUMIN 4.1 3.6   TBILIRUBIN 0.3 0.2   ALKPHOSPHAT 102* 83   TOTPROTEIN 7.6 6.6   ALTSGPT 21 15   ASTSGOT 28 23   CREATININE 0.81 0.56       Imaging:  US-OB 1ST TRIMESTER WITH TRANSVAGINAL (COMBO)    Result Date: 3/10/2024  3/10/2024 8:41 AM HISTORY/REASON FOR EXAM:  Incomplete . TECHNIQUE/EXAM DESCRIPTION: Real-time limited OB transvaginal ultrasound was performed with grey-scale, color and duplex Doppler imaging. COMPARISON:  None. FINDINGS: There is complex material within the endometrial cavity measuring 2.4 x 1.5 x 1 cm in size. This appears to be composed of endometrial thickening and some hypoechoic areas. A defined gestational sac is not seen. Right ovary measures 2.0 x 1.7 x 1.1 cm in  size. Left ovary measures 3 x 2.6 x 1.4 cm in size. Ovarian arterial flow is documented bilaterally using color and spectral Doppler analysis.. There is no evidence of free fluid.     1.  Complex material seen within the endometrial cavity measuring 2.0 x 1.7 x 1.1 cm in size. This is characterized by thickening of the endometrial stripe with some hypoechoic areas seen. A definite intrauterine  gestational sac is not seen. 2.  No evidence of adnexal mass.    DX-CHEST-LIMITED (1 VIEW)    Result Date: 3/8/2024  3/8/2024 10:28 PM HISTORY/REASON FOR EXAM:  Shortness of Breath TECHNIQUE/EXAM DESCRIPTION AND NUMBER OF VIEWS: Single portable view of the chest. COMPARISON: 9/10/2020 FINDINGS: Cardiomediastinal contour is within normal limits. No focal pulmonary consolidation. No pleural fluid collection or pneumothorax. No major bony abnormality is seen. Artifact from clothing noted.     No acute cardiopulmonary disease.    CT-ABDOMEN-PELVIS WITH    Result Date: 3/7/2024  3/7/2024 11:13 AM HISTORY/REASON FOR EXAM:  Possible pelvic thrombophlebitis. Nausea and vomiting chills. Possible hemorrhage or retained products of conception uterus on ultrasound earlier today. TECHNIQUE/EXAM DESCRIPTION:   CT scan of the abdomen and pelvis with contrast. Contrast-enhanced helical scanning was obtained from the diaphragmatic domes through the pubic symphysis following the bolus administration of nonionic contrast without complication. 100 mL of Omnipaque 350 nonionic contrast was administered without complication. Low dose optimization technique was utilized for this CT exam including automated exposure control and adjustment of the mA and/or kV according to patient size. COMPARISON: Ultrasound 3/7/2024, CT 9/10/2020 FINDINGS: Lower Chest: There is minimal bibasilar dependent atelectasis. Liver: Simple inferior right lobe hepatic cyst is again noted. Spleen: Unremarkable. Pancreas: Unremarkable. Gallbladder: No calcified stones. Biliary: Nondilated. Adrenal glands: Normal. Kidneys: Unremarkable without hydronephrosis. Lymph nodes: No adenopathy. Vasculature: Unremarkable. No venous thrombosis identified. Bowel: No obstruction or acute inflammation. Peritoneum: There are postsurgical changes in the right lower quadrant again noted. No ascites. Pelvis: There is heterogeneous hyperdense material in the central endometrial region  of the uterus with some surrounding fluid. No adnexal mass. The bladder is unremarkable. Musculoskeletal: No acute or destructive process.     1.  There is hyperdense material in the central endometrial region of the uterus which could be blood products or retained products of conception based on the clinical history. 2.  No adnexal mass. 3.  No venous thrombosis is identified.    US-OB TRANSVAGINAL ONLY    Result Date: 3/7/2024  3/7/2024 8:13 AM HISTORY/REASON FOR EXAM:  Vaginal bleeding TECHNIQUE/EXAM DESCRIPTION: First trimester transabdominal ultrasound. Real-time OB transabdominal pelvis ultrasound was performed with grey-scale, color and duplex Doppler imaging. COMPARISON: FINDINGS: UTERUS: Uterus is anteverted and measures 6.7 x 5.3 x 3.6 cm. There is no intrauterine pregnancy as would be identified by gestational sac containing yolk sac and fetal pole. There is complex heterogeneous material within the endometrial canal which could represent hemorrhage and/or retained products of conception.. OVARIES: The right ovary is not visualized, obscured by bowel gas. The left ovary measures 2.79 cm x 1.20 cm x 1.54 cm. Doppler examination of the left ovary shows normal waveforms. No adnexal masses are seen. Ectopic pregnancy is not seen but cannot be excluded on this exam. No free fluid in the pelvis.     1.  No evidence of intrauterine pregnancy. 2.  Complex material within the lower uterine segment and cervix could represent hemorrhage and/or retained products of conception. 3.  Right ovary is not visualized.      Micro:  Results       Procedure Component Value Units Date/Time    Blood Culture [[id number]] Collected: 03/10/24 1818    Order Status: Completed Specimen: Blood from Peripheral Updated: 03/11/24 0707     Significant Indicator NEG     Source BLD     Site PERIPHERAL     Culture Result No Growth  Note: Blood cultures are incubated for 5 days and  are monitored continuously.Positive blood cultures  are called  "to the RN and reported as soon as  they are identified.      Narrative:      Per Hospital Policy: Only change Specimen Src: to \"Line\" if  specified by physician order.  Right AC    Urine Culture (New) [[id number]] Collected: 03/08/24 2041    Order Status: Completed Specimen: Urine Updated: 03/10/24 1629     Significant Indicator NEG     Source UR     Site -     Culture Result Usual urogenital keily >100,000 cfu/mL    Narrative:      Indication for culture:->Emergency Room Patient  Indication for culture:->Emergency Room Patient    Blood Culture - Draw one from central line and one from peripheral site [[id number]]  (Abnormal) Collected: 03/08/24 2040    Order Status: Completed Specimen: Blood from Peripheral Updated: 03/10/24 1609     Significant Indicator POS     Source BLD     Site PERIPHERAL     Culture Result Growth detected by Bactec instrument. 03/10/2024  16:09  Gram Stain: Gram positive rods.      Narrative:      Blood Cultures X2. Draw one blood culture from central line  and one blood culture peripherally. If no line present, draw  blood cultures times two peripherally from different sites.  Left Wrist    Blood Culture - Draw one from central line and one from peripheral site [[id number]] Collected: 03/08/24 2054    Order Status: Completed Specimen: Blood from Line Updated: 03/09/24 0654     Significant Indicator NEG     Source BLD     Site PERIPHERAL     Culture Result No Growth  Note: Blood cultures are incubated for 5 days and  are monitored continuously.Positive blood cultures  are called to the RN and reported as soon as  they are identified.      Narrative:      Blood Cultures X2. Blood Cultures X2. Draw one blood culture  from central line and one blood culture peripherally. If no  line present, draw blood cultures times two peripherally from  different sites.  Left AC    MRSA By PCR (Amp) [[id number]] Collected: 03/08/24 2136    Order Status: Completed Specimen: Respirate from Nares Updated: 03/08/24 " 2359     MRSA by PCR Negative    Urinalysis [[id number]]  (Abnormal) Collected: 03/08/24 2041    Order Status: Completed Specimen: Urine Updated: 03/08/24 2125     Color Yellow     Character Clear     Specific Gravity 1.011     Ph 6.5     Glucose Negative mg/dL      Ketones Negative mg/dL      Protein Negative mg/dL      Bilirubin Negative     Urobilinogen, Urine 1.0     Nitrite Negative     Leukocyte Esterase Moderate     Occult Blood Moderate     Micro Urine Req Microscopic    Narrative:      Indication for culture:->Emergency Room Patient            Assessment:  Active Hospital Problems    Diagnosis     *Bacteremia [R78.81]     Anemia [D64.9]     Hypokalemia [E87.6]     Miscarriage [O03.9]     Essential hypertension [I10]      N/V with flagyl PO as outpatient but tolerated oral dose this am  Gardnerella vaginitis  Recent miscarriage with RPC-decreasing bHCG  -s/p D and C  Endometritis  3/7 +blood culture-with mult anaerobes. Actino in 1/2, Solobacterium 1/2(very rare), Prevotella 2/2  Repeat blood cult 3/8 neg to date          PLAN:   Continue to follow blood cultures  Continue doxycycline +Flagyl for 14 days-  OB/Gyn management appreciated    Will sign off-please reconsult if needed  FU ID clinic 1-2 weeks after discharge   OK to see Harmony LEE

## 2024-03-11 NOTE — ANESTHESIA PROCEDURE NOTES
Airway    Date/Time: 3/10/2024 10:41 PM    Performed by: Vickie Peña M.D.  Authorized by: Vickie Peña M.D.    Location:  OR  Urgency:  Elective  Indications for Airway Management:  Anesthesia      Spontaneous Ventilation: absent    Sedation Level:  Deep  Preoxygenated: Yes    Mask Difficulty Assessment:  0 - not attempted  Final Airway Type:  Supraglottic airway  Final Supraglottic Airway:  Standard LMA    SGA Size:  4  Airway Seal Pressure (cm H2O):  22  Number of Attempts at Approach:  1

## 2024-03-11 NOTE — ANESTHESIA POSTPROCEDURE EVALUATION
Patient: Tiffany Francois    Procedure Summary       Date: 03/10/24 Room / Location: [facility] 09 / SURGERY [facility]    Anesthesia Start: 2236 Anesthesia Stop: 2310    Procedure: DILATION AND CURETTAGE (Uterus) Diagnosis: (RETAINED PRODUCTS OF CONCEPTION)    Surgeons: Lashawn Booker M.D. Responsible Provider: Vickie Peña M.D.    Anesthesia Type: general ASA Status: 2 - Emergent            Final Anesthesia Type: general  Last vitals  BP   Blood Pressure: (!) 84/45 (provider vicike aware at bedside keep map >55)    Temp   36.3 °C (97.3 °F)    Pulse   62   Resp   (!) 6    SpO2   99 %      Anesthesia Post Evaluation    Patient location during evaluation: PACU  Patient participation: complete - patient participated  Level of consciousness: sleepy but conscious  Pain score: 0    Airway patency: patent  Anesthetic complications: no  Cardiovascular status: hemodynamically stable  Respiratory status: acceptable  Hydration status: euvolemic    PONV: none          No notable events documented.     Nurse Pain Score: 0 (NPRS)

## 2024-03-11 NOTE — DISCHARGE SUMMARY
Discharge Summary    CHIEF COMPLAINT ON ADMISSION  Chief Complaint   Patient presents with    Abnormal Labs     Patient has positive results in both blood culture sets. 1 set has Gram-positive cocci and the other set has Gram-positive cocci and Gram-positive rods.       Reason for Admission  sent by md     Admission Date  3/8/2024    CODE STATUS  Full Code    HPI & HOSPITAL COURSE  Tiffany Francois is a 33 y.o. female admitted 3/8/2024 with hypertension who had presented to the hospital ER on March 7 with vaginal bleeding. At that time she was suspected to have a miscarriage with passage of clots and some retained products of conception. She also had some additional vaginal green discharge at that time. She was discharged with oral Flagyl and was taking at home and then started having vomiting as well as severe fevers and chills. Her blood cultures from March 7 are now growing bacteria and she was told to come back to the emergency room for admission to the hospital. She was placed on empiric IV vancomycin and Flagyl and infectious disease has been consulted.     Gynecology was consulted.  The patient received 800 mcg of Cytotec with some partial expulsion of retained products of conception.  Her second set of blood cultures grew Prevotella bivia.  Third set of blood cultures remain no growth to date.  She had no fevers during this hospitalization and normal white blood cell count.  Given the persistent bacteremia and likely Endo metria-itis the patient underwent a D&C in the evening of March 10, 2024 without issue. She was found to have confirmed retained POC. Postoperatively she did well with mild pain that was resolved with Tylenol and tramadol.  She was transitioned to oral Flagyl and oral doxycycline to complete a 14-day course which 11 days of left.    Of note, PATH from the D&C is pending at this time. Her hemoglobin remains stable and her iron stores are replete at this time.     Therefore, she is  discharged in good and stable condition to home with close outpatient follow-up.    The patient met 2-midnight criteria for an inpatient stay at the time of discharge.    Discharge Date  3/11/2024    FOLLOW UP ITEMS POST DISCHARGE  F/U with Dr Thomason, her primary gynecologist, in 1 week  F/U with PCP in 1 week  F/U with [facility] ID clinic in 2 weeks    DISCHARGE DIAGNOSES  Principal Problem:    Bacteremia (POA: Yes)  Active Problems:    Essential hypertension (POA: Yes)    Miscarriage (POA: Yes)    Anemia (POA: Yes)    Hypokalemia (POA: Yes)  Resolved Problems:    * No resolved hospital problems. *      FOLLOW UP  Future Appointments   Date Time Provider Department Center   3/15/2024  8:00 AM RNEE Jimenez [facility]   3/27/2024  3:00 PM RENE Jimenez [facility]     RENE Jimenez  [address]  [address]  [place] [id number] [phone]    Follow up in 1 week(s)  post admission follow up    Alona Thomason M.D.  [address]  [address]  [place] [id number] [phone]    Follow up in 1 week(s)  post admission follow up for D&C      MEDICATIONS ON DISCHARGE     Medication List        START taking these medications        Instructions   doxycycline monohydrate 100 MG tablet  Commonly known as: Adoxa   Take 1 Tablet by mouth every 12 hours for 11 days.  Dose: 100 mg            CHANGE how you take these medications        Instructions   metroNIDAZOLE 500 MG Tabs  What changed: when to take this  Commonly known as: Flagyl   Take 1 Tablet by mouth every 12 hours for 11 days.  Dose: 500 mg            CONTINUE taking these medications        Instructions   Excedrin Extra Strength 250-250-65 MG Tabs  Generic drug: asa/apap/caffeine   Take 2 Tablets by mouth every 6 hours as needed for Headache.  Dose: 2 Tablet     ibuprofen 200 MG Tabs  Commonly known as: Motrin   Take 600 mg by mouth every 6 hours as needed for Mild Pain or Headache. 3 tablets = 600  mg.  Dose: 600 mg     lisinopril-hydrochlorothiazide 10-12.5 MG per tablet  Commonly known as: Prinzide   Take 1 Tablet by mouth every day.  Dose: 1 Tablet     ondansetron 4 MG Tbdp  Commonly known as: Zofran ODT   Take 1 Tablet by mouth every 6 hours as needed for Nausea/Vomiting.  Dose: 4 mg     Ubrelvy 50 MG Tabs  Generic drug: Ubrogepant   Take 50 mg by mouth as needed (Migraine). Take 1 tablet (50 mg) at onset of migraine. May repeat dose after 2 hours if migraine persists. Max of 2 tablets within 24 hours.  Dose: 50 mg     Womens Multivitamin Tabs   Take 1 Tablet by mouth every morning.  Dose: 1 Tablet            STOP taking these medications      Antonette 0.35 MG tablet  Generic drug: norethindrone              Allergies  No Known Allergies    DIET  Orders Placed This Encounter   Procedures    Diet Order Diet: Regular     Standing Status:   Standing     Number of Occurrences:   1     Order Specific Question:   Diet:     Answer:   Regular [1]       ACTIVITY  As tolerated.  Weight bearing as tolerated    CONSULTATIONS  Gyn and ID    PROCEDURES  As noted above    US-OB 1ST TRIMESTER WITH TRANSVAGINAL (COMBO)   Final Result      1.  Complex material seen within the endometrial cavity measuring 2.0 x 1.7 x 1.1 cm in size. This is characterized by thickening of the endometrial stripe with some hypoechoic areas seen. A definite intrauterine gestational sac is not seen.      2.  No evidence of adnexal mass.      DX-CHEST-LIMITED (1 VIEW)   Final Result      No acute cardiopulmonary disease.            LABORATORY  Lab Results   Component Value Date    SODIUM 138 03/09/2024    POTASSIUM 3.4 (L) 03/09/2024    CHLORIDE 102 03/09/2024    CO2 23 03/09/2024    GLUCOSE 93 03/09/2024    BUN 9 03/09/2024    CREATININE 0.56 03/09/2024        Lab Results   Component Value Date    WBC 8.2 03/11/2024    HEMOGLOBIN 12.4 03/11/2024    HEMATOCRIT 36.4 (L) 03/11/2024    PLATELETCT 257 03/11/2024        Total time of the discharge  process exceeds 34 minutes.

## 2024-03-11 NOTE — OR NURSING
Pt arrives from OR to PACU at 2305. Pt identification verified by team, pt placed on all monitors with alarms audible, report and care of pt received from Anesthesiologist and RN. Assessment completed, pt changed into hospital gown and provided with warm blankets.  Pt medicated for pain, denies any nausea.  Pt family notified via text message.  No belongings at bedside. Pt tolerating oral intake. No vaginal bleeding noted. Report given to Shanna MCGUIRE.

## 2024-03-11 NOTE — OP REPORT
Operative Report  03/10/24      PreOp Diagnosis:   Suspected retained products of conception s/p early SAB  Bacteremia, suspected source endometritis       PostOp Diagnosis:   Suspected retained products of conception s/p early SAB  Bacteremia, suspected source endometritis       Procedure(s):  DILATION AND CURETTAGE - Wound Class: Clean Contaminated    Surgeon(s):  Lashawn Booker M.D.    Anesthesiologist/Type of Anesthesia:  Anesthesiologist: Vickie Peña M.D./General    Surgical Staff:  Circulator: Nati Mohan R.N.  Scrub Person: Leopold von C Garcia    Specimens removed if any:  ID Type Source Tests Collected by Time Destination   A : UTERINE CONTENTS Other Other PATHOLOGY SPECIMEN Lashawn Booker M.D. 3/10/2024 10:53 PM        Estimated Blood Loss: 20cc  IVF: 600cc LR  UOP: 50cc      Findings: suspected products of conception obtained and sent to pathology    Complications: none    Procedure in Detail:  Pt is brought to the operating room where general anesthesia was initiated.  She was prepped and draped in the normal sterile fashion in dorsolithotomy position using Ilya stirrups.  The bladder was drained with straight catheter producing approximately 50cc of clear urine.  A speculum was inserted into the vagina and the anterior lip of the cervix grasped with single-tooth tenaculum.  The uterus sounded to 7cm.  The cervix was easily dilated with Reddy dilators to size 16.  A size 7 curved rigid curette was easily advanced to the cervix to the fundus and suction was applied.  2 passes were made with aspiration of tissue which was labeled and sent to pathology.  A sharp curette was advanced into the uterus and the endometrial cavity gently curetted, ensuring a gritty texture circumferentially.  All instruments were removed from the vagina with hemostasis noted.  The patient was awakened from general anesthesia and taken recovery in stable condition.  All counts were  correct.    Lashawn Booker MD          3/10/2024 11:36 PM Lashawn Booker M.D.

## 2024-03-11 NOTE — ANESTHESIA TIME REPORT
Anesthesia Start and Stop Event Times       Date Time Event    3/10/2024 2225 Ready for Procedure     2236 Anesthesia Start     2310 Anesthesia Stop          Responsible Staff  03/10/24      Name Role Begin End    Vickie Peña M.D. Anesth 2236 2310          Overtime Reason:  no overtime (within assigned shift)    Comments:

## 2024-03-11 NOTE — PROGRESS NOTES
[facility] Medicine Daily Progress Note    Date of Service  3/10/2024    Chief Complaint  Tiffany Francois is a 33 y.o. female admitted 3/8/2024 with hypertension who had presented to the hospital ER on March 7 with vaginal bleeding.  At that time she was suspected to have a miscarriage with passage of clots and some retained products of conception.  She also had some additional vaginal green discharge at that time.  She was discharged with oral Flagyl and was taking at home and then started having vomiting as well as severe fevers and chills.  Her blood cultures from March 7 are now growing bacteria and she was told to come back to the emergency room for admission to the hospital.  She was placed on empiric IV vancomycin and Flagyl and infectious disease has been consulted.    Hospital Course  See above    Interval Problem Update  3/9  Feels better today, no shaking chills. Mild vaginal discomfort and some bleeding, but no foul odor appreciated. No fevers overnight. I personally spoke with Dr Garvey of ID about the patient's case in detail. She will see the patient.     3/10  Patient had some mild vaginal cramping and bleeding, but otherwise felt ok. Afebrile. Hb stable. Tolerating IV abx's without issue. I personally spoke with Dr david of gynecology about patient's repeat vaginal u/s which shows persistent POC. Additionally, 2nd set of blood cultures growing Gram positive Cocci again. Patient will be going for D&C tonight.     I have discussed this patient's plan of care and discharge plan at IDT rounds today with Case Management, Nursing, Nursing leadership, and other members of the IDT team.    Consultants/Specialty  Infectious diseases  Gynecology/OB    Code Status  Full Code    Disposition  The patient is not medically cleared for discharge to home or a post-acute facility.  Anticipate discharge to: home with close outpatient follow-up    I have placed the appropriate orders for post-discharge  needs.    Review of Systems  Review of Systems   Constitutional:  Negative for chills, fever and malaise/fatigue.   Gastrointestinal:  Positive for abdominal pain. Negative for nausea and vomiting.   Genitourinary:  Negative for dysuria, frequency, hematuria and urgency.        Mild vaginal bleeding, no foul smelling discharge  Some abdominal cramping noted        Physical Exam  Temp:  [36.1 °C (97 °F)-36.9 °C (98.4 °F)] 36.2 °C (97.2 °F)  Pulse:  [68-85] 85  Resp:  [17] 17  BP: ()/(58-82) 119/82  SpO2:  [96 %-97 %] 97 %    Physical Exam  Vitals and nursing note reviewed.   Constitutional:       General: She is not in acute distress.     Appearance: She is well-developed.      Comments: Laying in bed, appears comfortable  No clear changes noted   HENT:      Head: Normocephalic and atraumatic.      Mouth/Throat:      Pharynx: No oropharyngeal exudate.   Eyes:      General: No scleral icterus.     Pupils: Pupils are equal, round, and reactive to light.   Neck:      Thyroid: No thyromegaly.   Cardiovascular:      Rate and Rhythm: Normal rate and regular rhythm.      Heart sounds: Normal heart sounds. No murmur heard.  Pulmonary:      Effort: Pulmonary effort is normal. No respiratory distress.      Breath sounds: Normal breath sounds. No wheezing.   Abdominal:      General: Bowel sounds are normal. There is no distension.      Palpations: Abdomen is soft.      Tenderness: There is abdominal tenderness (mild, lower suprapubic area).   Musculoskeletal:         General: No tenderness. Normal range of motion.      Cervical back: Normal range of motion and neck supple.   Skin:     General: Skin is warm and dry.      Findings: No rash.   Neurological:      Mental Status: She is alert and oriented to person, place, and time.         Fluids    Intake/Output Summary (Last 24 hours) at 3/10/2024 1951  Last data filed at 3/10/2024 1000  Gross per 24 hour   Intake 0 ml   Output --   Net 0 ml       Laboratory  Recent Labs      03/08/24 2016 03/09/24  0353 03/10/24  0040   WBC 5.1 6.8 4.8   RBC 3.81* 3.58* 3.61*   HEMOGLOBIN 11.7* 11.0* 11.0*   HEMATOCRIT 33.6* 31.7* 32.9*   MCV 88.2 88.5 91.1   MCH 30.7 30.7 30.5   MCHC 34.8 34.7 33.4   RDW 39.6 39.2 41.1   PLATELETCT 155* 138* 185   MPV 11.4 11.2 11.0     Recent Labs     03/08/24 2016 03/09/24  0353   SODIUM 137 138   POTASSIUM 3.1* 3.4*   CHLORIDE 98 102   CO2 23 23   GLUCOSE 122* 93   BUN 12 9   CREATININE 0.81 0.56   CALCIUM 9.1 8.6                   Imaging  US-OB 1ST TRIMESTER WITH TRANSVAGINAL (COMBO)   Final Result      1.  Complex material seen within the endometrial cavity measuring 2.0 x 1.7 x 1.1 cm in size. This is characterized by thickening of the endometrial stripe with some hypoechoic areas seen. A definite intrauterine gestational sac is not seen.      2.  No evidence of adnexal mass.      DX-CHEST-LIMITED (1 VIEW)   Final Result      No acute cardiopulmonary disease.           Assessment/Plan  * Bacteremia- (present on admission)  Assessment & Plan  Remains clinically about the same on 3/10  Initial blood cultures from 3/7 are growing:  Culture Result Prevotella bivia Abnormal  P   Culture Result      Abnormal   Anaerobic Gram positive chantelle  Solobacterium moorei  P      Culture Result      Abnormal   Actinomyces turicensis  Possible Contaminant  Isolated from one set only, please correlate with clinical  condition. Contact the Microbiology department within 48 hr  if identification and susceptibility are needed.       Repeat blood cultures from 3/8 now growing Gram positive cocci as well (likely to be Prevotella)  Repeat blood cultures on 3/10  Persistent bacteremia, continue oral flagyl, high risk for clinical worsening, added doxycycline  Likely an element of endometritis  MRSA nares negative, WBC remains normal  ID consulted  Stopped IV vancomycin    I personally reviewed all of these labs on 3/10    Hypokalemia- (present on admission)  Assessment & Plan  Remains  mildly low  I personally reviewed the bmp 3/10    Anemia- (present on admission)  Assessment & Plan  Mild, small drop, likely from ongoing mild vaginal bleeding  Fe labs are normal  Trend daily, conservative transfusion threshold    I personally reviewed the iron labs on 3/10    Miscarriage- (present on admission)  Assessment & Plan  Retained POC  Gynecology consulted 3/9  S/p 800 mcg of cytotec given PM of 3/9, minimal output  Repeat transvaginal US 3/10 shows continued retained POC and along with persistent bacteremia, likely would benefit from a D&C  D&C to be done this PM  Gynecology following and greatly appreciate their help      Essential hypertension- (present on admission)  Assessment & Plan  Hold blood pressure medications for now  Blood pressure remains well controlled at this time         VTE prophylaxis:   SCDs/TEDs      I have performed a physical exam and reviewed and updated ROS and Plan today (3/10/2024). In review of yesterday's note (3/9/2024), there are no changes except as documented above.

## 2024-03-11 NOTE — ANESTHESIA PREPROCEDURE EVALUATION
Case: [id number] Date/Time: 03/10/24 2015    Procedure: DILATION AND CURETTAGE    Location: [facility] OR 09 / SURGERY [facility]    Surgeons: Lashawn Booker M.D.          34yo with retained products after spont , now with bacteremia, here for D & C      Relevant Problems   NEURO   (positive) Migraine headache      CARDIAC   (positive) Essential hypertension   (positive) Migraine headache       Physical Exam    Airway   Mallampati: II  TM distance: >3 FB  Neck ROM: full       Cardiovascular - normal exam  Rhythm: regular  Rate: normal  (-) murmur     Dental - normal exam           Pulmonary - normal exam  Breath sounds clear to auscultation     Abdominal    Neurological - normal exam                   Anesthesia Plan    ASA 2- EMERGENT   ASA physical status emergent criteria: acutely contaminated wound or identified infection source and acute hemorrhage    Plan - general       Airway plan will be LMA          Induction: intravenous    Postoperative Plan: Postoperative administration of opioids is intended.    Pertinent diagnostic labs and testing reviewed    Informed Consent:    Anesthetic plan and risks discussed with patient.    Use of blood products discussed with: patient whom consented to blood products.

## 2024-03-13 LAB
BACTERIA BLD CULT: NORMAL
SIGNIFICANT IND 70042: NORMAL
SITE SITE: NORMAL
SOURCE SOURCE: NORMAL

## 2024-03-15 LAB
BACTERIA BLD CULT: NORMAL
SIGNIFICANT IND 70042: NORMAL
SITE SITE: NORMAL
SOURCE SOURCE: NORMAL

## 2024-03-19 ENCOUNTER — OFFICE VISIT (OUTPATIENT)
Dept: MEDICAL GROUP | Facility: PHYSICIAN GROUP | Age: 34
End: 2024-03-19
Payer: COMMERCIAL

## 2024-03-19 VITALS
BODY MASS INDEX: 23.79 KG/M2 | DIASTOLIC BLOOD PRESSURE: 70 MMHG | SYSTOLIC BLOOD PRESSURE: 106 MMHG | RESPIRATION RATE: 20 BRPM | TEMPERATURE: 97.9 F | OXYGEN SATURATION: 92 % | HEIGHT: 68 IN | WEIGHT: 157 LBS | HEART RATE: 84 BPM

## 2024-03-19 DIAGNOSIS — I10 ESSENTIAL HYPERTENSION: ICD-10-CM

## 2024-03-19 DIAGNOSIS — O03.9 MISCARRIAGE: ICD-10-CM

## 2024-03-19 DIAGNOSIS — R78.81 BACTEREMIA: ICD-10-CM

## 2024-03-19 PROCEDURE — 99214 OFFICE O/P EST MOD 30 MIN: CPT

## 2024-03-19 PROCEDURE — 3074F SYST BP LT 130 MM HG: CPT

## 2024-03-19 PROCEDURE — 3078F DIAST BP <80 MM HG: CPT

## 2024-03-19 RX ORDER — ACETAMINOPHEN AND CODEINE PHOSPHATE 120; 12 MG/5ML; MG/5ML
1 SOLUTION ORAL DAILY
COMMUNITY

## 2024-03-19 ASSESSMENT — ENCOUNTER SYMPTOMS
CONSTIPATION: 0
DIARRHEA: 0
ABDOMINAL PAIN: 1
BLOOD IN STOOL: 0

## 2024-03-19 ASSESSMENT — FIBROSIS 4 INDEX: FIB4 SCORE: 0.79

## 2024-03-19 NOTE — PROGRESS NOTES
Verbal consent was acquired by the patient to use [employer] ambient listening note generation during this visit     Subjective:     CC: Diagnoses of Bacteremia, Essential hypertension, and Miscarriage were pertinent to this visit.    HPI:   Tiffany presents today with    History of Present Illness  The patient presents today for hospital follow-up. She was hospitalized from 03/08/2024 to 03/11/2024. She was admitted with high blood pressure, vaginal bleeding, and miscarriage.    She is still on antibiotics. She keeps getting bouts of nausea and dizziness. She is taking the medications with food as recommended. She has been taking ondansetron in the morning, but she is not sure if it is working. She was pregnant probably 8 weeks from the last start date of her menstrual period and missing her next 2 cycles. [facility] and has an appointment scheduled for next Friday. She has infection control follow-up this Friday.      She has been going on walks a little bit more and trying to get fresh air. She is on doxycycline, Flagyl, and Zofran once a day in the morning as needed. She is still taking the lisinopril. When she was in the hospital, they did not give her lisinopril for 4 mornings, but her blood pressure was pretty good. She was not getting up and walking around either much. She was probably dehydrated. She checked her blood pressure when she came home randomly and it was 120/98. She and her boyfriend were off of the Antonette oral contraceptive tablets because Dr. Thomason had recommended to go off of those for a few months to let her irregular menstrual cycles get back to normal. She took the morning after pill, after unprotected coitus with her partner. She did not think too much of it until her next cycle was supposed to come around. Once the second month came around, she started to have breast tenderness and classic morning sickness before work. She  "has resumed taking Antonette oral contraceptive tablets.   She reports she does not desire pregnancy currently but may in the future when her partner is out of the .    She will be done with all her antibiotics on Friday evening. She denies any diarrhea, constipation, or blood in her stool. She was bleeding for 3 weeks between her miscarriage and going to the ER. It has been minimal the last few days.    Supplemental Information  She was supposed to have an appointment with the pain doctor for her migraines tomorrow, but she canceled that. She has a supply of Ubrelvy.    Problem   Bacteremia   Miscarriage   Essential Hypertension     ROS:  Review of Systems   Gastrointestinal:  Positive for abdominal pain. Negative for blood in stool, constipation and diarrhea.   Genitourinary:         Spotting   All other systems reviewed and are negative.      Objective:     Exam:  /70 (BP Location: Left arm, Patient Position: Sitting, BP Cuff Size: Adult)   Pulse 84   Temp 36.6 °C (97.9 °F) (Temporal)   Resp 20   Ht 1.727 m (5' 8\")   Wt 71.2 kg (157 lb)   LMP 03/08/2024 (Approximate)   SpO2 92%   BMI 23.87 kg/m²  Body mass index is 23.87 kg/m².    Physical Exam  Vitals reviewed.   Constitutional:       General: She is not in acute distress.     Appearance: Normal appearance. She is not ill-appearing.   HENT:      Head: Normocephalic and atraumatic.   Cardiovascular:      Rate and Rhythm: Normal rate.      Pulses: Normal pulses.   Pulmonary:      Effort: Pulmonary effort is normal. No respiratory distress.   Skin:     General: Skin is warm and dry.      Findings: No rash.   Neurological:      General: No focal deficit present.      Mental Status: She is alert and oriented to person, place, and time.   Psychiatric:         Mood and Affect: Mood normal.         Behavior: Behavior normal.       Labs: reviewed from hospital visit    Assessment & Plan:     33 y.o. female with the following -     Assessment & " Plan  1. Hospital follow-up.  Her blood pressure is well controlled today. It is my recommendation for her to remain off of work until 03/25/2024. She can return to work without restrictions at this time.    2. Nausea and dizziness.  She was advised to take a probiotic or prebiotic.    3. Grief.   She declines counseling at this time. If she feels like counseling would be helpful, she will let me know.    4. Miscarriage.  Lisinopril is not recommended during pregnancy. She will continue taking lisinopril.  Continue taking OCP.  If pregnancy is desired we will modify hypertension treatment  Follow-up  The patient will follow up in 05/2024.    Problem List Items Addressed This Visit          Family Medicine Problems    Essential hypertension     Chronic, stable. Bp in clinic today 106/70. Is currently taking lisinopril/hctz 10/12.5 mg daily. Discussed contraindication with pregnancy. Does not desire pregnancy currently, is taking cocp.            Other    Bacteremia     Remains on antibiotics, finishing metronidazole and doxycycline 3/22/24.         Relevant Orders    CBC WITH DIFFERENTIAL    Miscarriage     D/c completed 3/10/24. Has follow up with Dr. Powell 3/29/24          Patient was educated in proper administration of medication(s) ordered today including safety, possible SE, risks, benefits, rationale and alternatives to therapy.   Supportive care, differential diagnoses, and indications for immediate follow-up discussed with patient.    Pathogenesis of diagnosis discussed including typical length and natural progression.    Instructed to return to clinic or nearest emergency department for any change in condition, further concerns, or worsening of symptoms.  Patient states understanding of the plan of care and discharge instructions.    Return if symptoms worsen or fail to improve.    Please note that this dictation was created using voice recognition software. I have made every reasonable attempt to correct  obvious errors, but I expect that there are errors of grammar and possibly content that I did not discover before finalizing the note.

## 2024-03-19 NOTE — ASSESSMENT & PLAN NOTE
Chronic, stable. Bp in clinic today 106/70. Is currently taking lisinopril/hctz 10/12.5 mg daily. Discussed contraindication with pregnancy. Does not desire pregnancy currently, is taking cocp.

## 2024-03-19 NOTE — LETTER
[facility]  [address]  [place] [id number]     March 19, 2024    Patient: Tiffany Francois   YOB: 1990   Date of Visit: 3/19/2024       To Whom It May Concern:    Tiffany Francois was seen and treated in our department on 3/19/2024, a follow up visit after hospitalization and emergent surgery 3/10/24.     It is my recommendation for her to remain off work until 3/25/24, she can return to work without restrictions on this date.       Sincerely,         VALENCIA Jimenez.

## 2024-03-20 ENCOUNTER — HOSPITAL ENCOUNTER (OUTPATIENT)
Dept: LAB | Facility: MEDICAL CENTER | Age: 34
End: 2024-03-20
Payer: COMMERCIAL

## 2024-03-20 DIAGNOSIS — R78.81 BACTEREMIA: ICD-10-CM

## 2024-03-20 LAB
BASOPHILS # BLD AUTO: 0.6 % (ref 0–1.8)
BASOPHILS # BLD: 0.04 K/UL (ref 0–0.12)
EOSINOPHIL # BLD AUTO: 0.04 K/UL (ref 0–0.51)
EOSINOPHIL NFR BLD: 0.6 % (ref 0–6.9)
ERYTHROCYTE [DISTWIDTH] IN BLOOD BY AUTOMATED COUNT: 42.6 FL (ref 35.9–50)
HCT VFR BLD AUTO: 36.2 % (ref 37–47)
HGB BLD-MCNC: 11.9 G/DL (ref 12–16)
IMM GRANULOCYTES # BLD AUTO: 0.02 K/UL (ref 0–0.11)
IMM GRANULOCYTES NFR BLD AUTO: 0.3 % (ref 0–0.9)
LYMPHOCYTES # BLD AUTO: 1.94 K/UL (ref 1–4.8)
LYMPHOCYTES NFR BLD: 27.4 % (ref 22–41)
MCH RBC QN AUTO: 30.4 PG (ref 27–33)
MCHC RBC AUTO-ENTMCNC: 32.9 G/DL (ref 32.2–35.5)
MCV RBC AUTO: 92.3 FL (ref 81.4–97.8)
MONOCYTES # BLD AUTO: 0.5 K/UL (ref 0–0.85)
MONOCYTES NFR BLD AUTO: 7.1 % (ref 0–13.4)
NEUTROPHILS # BLD AUTO: 4.53 K/UL (ref 1.82–7.42)
NEUTROPHILS NFR BLD: 64 % (ref 44–72)
NRBC # BLD AUTO: 0 K/UL
NRBC BLD-RTO: 0 /100 WBC (ref 0–0.2)
PLATELET # BLD AUTO: 385 K/UL (ref 164–446)
PMV BLD AUTO: 11 FL (ref 9–12.9)
RBC # BLD AUTO: 3.92 M/UL (ref 4.2–5.4)
WBC # BLD AUTO: 7.1 K/UL (ref 4.8–10.8)

## 2024-03-20 PROCEDURE — 85025 COMPLETE CBC W/AUTO DIFF WBC: CPT

## 2024-03-20 PROCEDURE — 36415 COLL VENOUS BLD VENIPUNCTURE: CPT

## 2024-03-21 NOTE — PROGRESS NOTES
INFECTIOUS  DISEASE  OUTPATIENT CLINIC  NOTE   Subjective   Primary care provider: RENE Jimenez.     Reason for Follow Up:   Follow-up for   1. Miscarriage        2. Gardnerella vaginalis infection        3. Bacteremia            HPI: Patient previously seen and treated by ID team as inpatient during hospital admission.   Tiffany Francois is a 33 y.o. female admitted 3/8/2024 with PMH hypertension who had presented to the hospital ER on March 7 with vaginal bleeding. At that time she was suspected to have a miscarriage with passage of clots and some retained products of conception. She also had some additional vaginal green discharge at that time. She was discharged with oral Flagyl and was taking at home and then started having vomiting as well as severe fevers and chills. Her blood cultures from March 7 are growing bacteria and she was told to come back to the emergency room for admission to the hospital on 3/7. 3/7 Blood Cultures x1 + Actinomyces turicensis, repeat blood cultures same day x1 + Prevotella bivia, Solobacterium moorei, and Actinomyces turicensis,  other blood culture + Solobacterium moorei.  Gardnerella vaginalis+. 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 Repeat blood cultures on 3/8 + Actinomyces turicensis. 3/10 BXc negative to date.  Patient was treated with a 14-day course of p.o. doxycycline 100 mg twice daily and p.o. Flagyl 500 mg twice daily with end date of 3/22/2024.    03/22/24- Today Patient reports feeling well. Denies drainage, pungent odor, redness, pain. Denies feeling generally ill, fevers/chills, general malaise, headache, v/d.  Patient reports that she tolerated both p.o. doxycycline and p.o. Flagyl with complaints of mild nausea while taking both antibiotics.  Today is the last day of her antibiotic therapy she reports since her hospital admission her vaginal bleeding has  resolved.  Her energy level is slowly improving and she has not had any fever or chills. After completion of antibiotic therapy she is to monitor for signs and symptoms of recurrence of infection.  Most recent labs reviewed from 3/20/2024, WBC 7.1, stable anemia 11.9/36.2, neutrophils WNL, CMP from 3/9/2024 mostly unremarkable.    Current Antimicrobials: Doxycycline 100 mg twice daily and p.o. Flagyl 500 mg twice daily, 14-day course with end date of 3/22/2024   previous Antimicrobials:    Other Current Medications:  Home Medications    Medication Sig Taking? Last Dose Authorizing Provider   norethindrone (HELEN) 0.35 MG tablet Take 1 Tablet by mouth every day. Indications: Birth Control Treatment Yes Taking Physician Outpatient   metroNIDAZOLE (FLAGYL) 500 MG Tab Take 1 Tablet by mouth every 12 hours for 11 days. Yes Taking Eduardo Cerrato M.D.   doxycycline monohydrate (ADOXA) 100 MG tablet Take 1 Tablet by mouth every 12 hours for 11 days. Yes Taking Eduardo Cerrato M.D.   ibuprofen (MOTRIN) 200 MG Tab Take 600 mg by mouth every 6 hours as needed for Mild Pain or Headache. 3 tablets = 600 mg. Yes PRN Physician Outpatient   asa/apap/caffeine (EXCEDRIN EXTRA STRENGTH) 250-250-65 MG Tab Take 2 Tablets by mouth every 6 hours as needed for Headache. Yes PRN Physician Outpatient   Multiple Vitamins-Minerals (WOMENS MULTIVITAMIN) Tab Take 1 Tablet by mouth every morning. Yes Taking Physician Outpatient   ondansetron (ZOFRAN ODT) 4 MG TABLET DISPERSIBLE Take 1 Tablet by mouth every 6 hours as needed for Nausea/Vomiting. Yes PRN Romulo Alcala M.D.   lisinopril-hydrochlorothiazide (PRINZIDE) 10-12.5 MG per tablet Take 1 Tablet by mouth every day. Yes Taking RENE Jimenez   UBRELVY 50 MG Tab Take 50 mg by mouth as needed (Migraine). Take 1 tablet (50 mg) at onset of migraine. May repeat dose after 2 hours if migraine persists. Max of 2 tablets within 24 hours. Yes PRN Physician Outpatient         PMH:  Past Medical History:   Diagnosis Date    Hypertension     Migraine      Past Surgical History:   Procedure Laterality Date    DILATION AND CURETTAGE N/A 3/10/2024    Procedure: DILATION AND CURETTAGE;  Surgeon: Lashawn Booker M.D.;  Location: SURGERY [facility];  Service: Obstetrics    APPENDECTOMY       Family History   Problem Relation Age of Onset    No Known Problems Mother     No Known Problems Father     Hypertension Maternal Aunt     Diabetes Maternal Uncle     Hypertension Maternal Uncle     Breast Cancer Paternal Grandmother     Cancer Paternal Grandmother         breast    Diabetes Paternal Grandfather     Heart Disease Paternal Grandfather     Psychiatric Illness Neg Hx     Colorectal Cancer Neg Hx     Tubal Cancer Neg Hx     Peritoneal Cancer Neg Hx     Ovarian Cancer Neg Hx     Hyperlipidemia Neg Hx     Stroke Neg Hx      Social History     Socioeconomic History    Marital status: Single     Spouse name: Not on file    Number of children: Not on file    Years of education: Not on file    Highest education level: Bachelor's degree (e.g., BA, AB, BS)   Occupational History    Not on file   Tobacco Use    Smoking status: Never    Smokeless tobacco: Never   Vaping Use    Vaping Use: Never used   Substance and Sexual Activity    Alcohol use: Yes     Alcohol/week: 1.2 - 1.8 oz     Types: 2 - 3 Cans of beer per week     Comment: occassional - twice monthly at most    Drug use: No    Sexual activity: Yes     Partners: Male     Birth control/protection: Pill   Other Topics Concern    Not on file   Social History Narrative    Not on file     Social Determinants of Health     Financial Resource Strain: Low Risk  (10/3/2022)    Overall Financial Resource Strain (CARDIA)     Difficulty of Paying Living Expenses: Not hard at all   Food Insecurity: Unknown (10/3/2022)    Hunger Vital Sign     Worried About Running Out of Food in the Last Year: Never true     Ran Out of Food in the Last Year: Not on  file   Transportation Needs: No Transportation Needs (10/3/2022)    PRAPARE - Transportation     Lack of Transportation (Medical): No     Lack of Transportation (Non-Medical): No   Physical Activity: Sufficiently Active (10/3/2022)    Exercise Vital Sign     Days of Exercise per Week: 4 days     Minutes of Exercise per Session: 60 min   Stress: Stress Concern Present (10/3/2022)    [place] [employer] - Occupational Stress Questionnaire     Feeling of Stress : To some extent   Social Connections: Unknown (10/3/2022)    Social Connection and Isolation Panel [NHANES]     Frequency of Communication with Friends and Family: More than three times a week     Frequency of Social Gatherings with Friends and Family: Twice a week     Attends [employer] Services: Patient declined     Active Member of Clubs or Organizations: No     Attends Club or Organization Meetings: Patient declined     Marital Status: Living with partner   Intimate Partner Violence: Not on file   Housing Stability: High Risk (10/3/2022)    Housing Stability Vital Sign     Unable to Pay for Housing in the Last Year: Yes     Number of Places Lived in the Last Year: 1     Unstable Housing in the Last Year: No           Allergies/Intolerances:  No Known Allergies    ROS:   Review of Systems   Constitutional:  Negative for chills, fever, malaise/fatigue and weight loss.   HENT:  Negative for congestion and hearing loss.    Eyes:  Negative for blurred vision and double vision.   Respiratory:  Negative for cough, sputum production, shortness of breath and wheezing.    Cardiovascular:  Negative for chest pain and palpitations.   Gastrointestinal:  Positive for nausea. Negative for abdominal pain, constipation, diarrhea and vomiting.   Genitourinary:  Negative for dysuria and hematuria.   Musculoskeletal:  Negative for myalgias.   Skin:  Negative for itching and rash.   Neurological:  Negative for dizziness, focal weakness and headaches.  "  Endo/Heme/Allergies:  Does not bruise/bleed easily.   Psychiatric/Behavioral:  The patient is not nervous/anxious.       ROS was reviewed and were negative except as above.    Objective    Most Recent Vital Signs:  /70 (BP Location: Left arm, Patient Position: Sitting, BP Cuff Size: Adult)   Pulse 76   Temp 36.4 °C (97.6 °F)   Ht 1.727 m (5' 8\")   Wt 70.8 kg (156 lb)   LMP 03/08/2024 (Approximate)   SpO2 98%   BMI 23.72 kg/m²     Physical Exam:  Physical Exam  Vitals and nursing note reviewed.   Constitutional:       General: She is not in acute distress.     Appearance: Normal appearance. She is not ill-appearing.   HENT:      Head: Normocephalic and atraumatic.      Nose: Nose normal.      Mouth/Throat:      Mouth: Mucous membranes are moist.   Eyes:      Pupils: Pupils are equal, round, and reactive to light.   Cardiovascular:      Rate and Rhythm: Normal rate and regular rhythm.   Pulmonary:      Effort: Pulmonary effort is normal. No respiratory distress.      Breath sounds: Normal breath sounds. No stridor. No wheezing, rhonchi or rales.   Chest:      Chest wall: No tenderness.   Musculoskeletal:      Cervical back: Normal range of motion.      Right lower leg: No edema.      Left lower leg: No edema.   Skin:     General: Skin is warm and dry.      Coloration: Skin is not jaundiced or pale.   Neurological:      General: No focal deficit present.      Mental Status: She is alert and oriented to person, place, and time.   Psychiatric:         Mood and Affect: Mood normal.         Behavior: Behavior normal.          Pertinent Lab/Imaging Results:  [unfilled]  @CMP@  WBC   Date/Time Value Ref Range Status   03/20/2024 02:32 PM 7.1 4.8 - 10.8 K/uL Final     RBC   Date/Time Value Ref Range Status   03/20/2024 02:32 PM 3.92 (L) 4.20 - 5.40 M/uL Final     Hemoglobin   Date/Time Value Ref Range Status   03/20/2024 02:32 PM 11.9 (L) 12.0 - 16.0 g/dL Final     Hematocrit   Date/Time Value Ref Range Status " "  03/20/2024 02:32 PM 36.2 (L) 37.0 - 47.0 % Final     MCV   Date/Time Value Ref Range Status   03/20/2024 02:32 PM 92.3 81.4 - 97.8 fL Final     MCH   Date/Time Value Ref Range Status   03/20/2024 02:32 PM 30.4 27.0 - 33.0 pg Final     MCHC   Date/Time Value Ref Range Status   03/20/2024 02:32 PM 32.9 32.2 - 35.5 g/dL Final     Comment:     Please note new reference range effective 05/22/2023.     MPV   Date/Time Value Ref Range Status   03/20/2024 02:32 PM 11.0 9.0 - 12.9 fL Final      Sodium   Date/Time Value Ref Range Status   03/09/2024 03:53  135 - 145 mmol/L Final     Potassium   Date/Time Value Ref Range Status   03/09/2024 03:53 AM 3.4 (L) 3.6 - 5.5 mmol/L Final     Chloride   Date/Time Value Ref Range Status   03/09/2024 03:53  96 - 112 mmol/L Final     Co2   Date/Time Value Ref Range Status   03/09/2024 03:53 AM 23 20 - 33 mmol/L Final     Glucose   Date/Time Value Ref Range Status   03/09/2024 03:53 AM 93 65 - 99 mg/dL Final     Bun   Date/Time Value Ref Range Status   03/09/2024 03:53 AM 9 8 - 22 mg/dL Final     Creatinine   Date/Time Value Ref Range Status   03/09/2024 03:53 AM 0.56 0.50 - 1.40 mg/dL Final     Alkaline Phosphatase   Date/Time Value Ref Range Status   03/09/2024 03:53 AM 83 30 - 99 U/L Final     AST(SGOT)   Date/Time Value Ref Range Status   03/09/2024 03:53 AM 23 12 - 45 U/L Final     ALT(SGPT)   Date/Time Value Ref Range Status   03/09/2024 03:53 AM 15 2 - 50 U/L Final     Total Bilirubin   Date/Time Value Ref Range Status   03/09/2024 03:53 AM 0.2 0.1 - 1.5 mg/dL Final      No results found for: \"CPKTOTAL\"       No results found for: \"BLOODCULTU\", \"BLDCULT\", \"BCHOLD\"    No results found for: \"BLOODCULTU\", \"BLDCULT\", \"BCHOLD\"       BLOOD CULTURE x2  Order: [id number]   Status: Final result       Visible to patient: Yes (seen)       Next appt: Today at 09:00 AM in Infectious Diseases ([facility]    Specimen Information: Peripheral; Blood   0 Result Notes    " "  Component 2 wk ago   Significant Indicator POS Positive (POS)   Source BLD   Site PERIPHERAL   Culture Result Growth detected by Bactec instrument.  03/08/2024  07:07 Abnormal    Culture Result Prevotella bivia Abnormal    Culture Result  Abnormal   Anaerobic Gram positive chantelle  Solobacterium moorei    Culture Result  Abnormal   Actinomyces turicensis  Possible Contaminant    Resulting Agency M              Narrative  Performed by: M  CALL  Hung  ER tel. ,  CALLED  ER tel.  03/08/2024, 07:09, RB PERF. RESULTS CALLED TO: [id number]  Per Hospital Policy: Only change Specimen Src: to \"Line\" if  specified by physician order.  Left Hand      Specimen Collected: 03/07/24  8:06 AM Last Resulted: 03/10/24  8:43 AM         BLOOD CULTURE x2  Order: [id number]   Status: Final result       Visible to patient: Yes (seen)       Next appt: Today at 09:00 AM in Infectious Diseases ([facility]    Specimen Information: Peripheral; Blood   0 Result Notes      Component 2 wk ago   Significant Indicator POS Positive (POS)   Source BLD   Site PERIPHERAL   Culture Result Growth detected by Bactec instrument. 03/08/2024  14:03 Abnormal    Culture Result Prevotella bivia Abnormal    Culture Result  Abnormal   Anaerobic Gram positive chantelle  Solobacterium moorei    Resulting Agency [facility]              Narrative  Performed by:   CALL  Hung  ER tel. ,  CALLED  ER tel.  03/08/2024, 14:15, RB PERF. RESULTS CALLED TO: [id number]  Per Hospital Policy: Only change Specimen Src: to \"Line\" if  specified by physician order.  Right AC      Specimen Collected: 03/07/24  9:53 AM Last Resulted: 03/10/24  9:34 AM             Blood Culture - Draw one from central line and one from peripheral site  Order: [id number]   Status: Final result       Visible to patient: Yes (seen)       Next appt: Today at 09:00 AM in Infectious Diseases ([facility]    Specimen Information: Peripheral; Blood   0 Result Notes      Component 2 wk ago   Significant " Indicator POS Positive (POS)   Source BLD   Site PERIPHERAL   Culture Result Growth detected by Bactec instrument. 03/10/2024  16:09 Abnormal    Culture Result Actinomyces turicensis Abnormal    Resulting Agency M              Narrative  Performed by: BRADY  CALL  Hung  61 tel. [phone],  CALLED  61 tel. [phone] 03/10/2024, 16:12, RB PERF. RESULTS CALLED  TO:[id number], RN  Blood Cultures X2. Draw one blood culture from central line  and one blood culture peripherally. If no line present, draw  blood cultures times two peripherally from different sites.  Left Wrist      Specimen Collected: 03/08/24  8:40 PM Last Resulted: 03/11/24  2:16 PM         VAGINAL PATHOGENS DNA PANEL  Order: [id number]   Status: Final result       Visible to patient: Yes (seen)       Next appt: Today at 09:00 AM in Infectious Diseases ([facility]    0 Result Notes       Component  Ref Range & Units 2 wk ago 4 yr ago   Candida species DNA Probe  Negative Negative Negative   Trichamonas vaginalis DNA Probe  Negative Negative Negative   Gardnerella vaginalis DNA Probe  Negative POSITIVE Abnormal  POSITIVE Abnormal  CM   Comment: A positive result for Candida, Gardnerella and/or Trichomonas means nucleic  acid for Candida species (C. albicans, C. glabrata, C. kefyr, C. krusei,  C. parapsilosis, C. tropicalis), G. vaginalis and/or T. vaginalis,  respectively, is present in the sample and indicates the patient has  candidiasis, bacterial vaginosis, and/or trichomoniasis when consistent with  clinical signs and symptoms. Simultaneous infections by more than one  organism are common.   Resulting Agency M M              Specimen Collected: 03/07/24 10:10 AM Last Resulted: 03/07/24 11:19 AM           Impression/Assessment      1. Miscarriage        2. Gardnerella vaginalis infection        3. Bacteremia          Tiffany Francois is a 33 y.o. female admitted 3/8/2024 with PMH hypertension who had presented to the hospital ER on March 7  with vaginal bleeding. At that time she was suspected to have a miscarriage with passage of clots and some retained products of conception. She also had some additional vaginal green discharge at that time. She was discharged with oral Flagyl and was taking at home and then started having vomiting as well as severe fevers and chills. Her blood cultures from March 7 are growing bacteria and she was told to come back to the emergency room for admission to the hospital on 3/7. 3/7 Blood Cultures x1 + Actinomyces turicensis, repeat blood cultures same day x1 + Prevotella bivia, Solobacterium moorei, and Actinomyces turicensis,  other blood culture + Solobacterium moorei.  Gardnerella vaginalis+. 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 Repeat blood cultures on 3/8 + Actinomyces turicensis. 3/10 BXc negative to date.  Patient was treated with a 14-day course of p.o. doxycycline 100 mg twice daily and p.o. Flagyl 500 mg twice daily with end date of 3/22/2024.    03/22/24- Today Patient reports feeling well. Denies drainage, pungent odor, redness, pain. Denies feeling generally ill, fevers/chills, general malaise, headache, v/d.  Patient reports that she tolerated both p.o. doxycycline and p.o. Flagyl with complaints of mild nausea while taking both antibiotics.  Today is the last day of her antibiotic therapy she reports since her hospital admission her vaginal bleeding has resolved.  Her energy level is slowly improving and she has not had any fever or chills. After completion of antibiotic therapy she is to monitor for signs and symptoms of recurrence of infection.  Most recent labs reviewed from 3/20/2024, WBC 7.1, stable anemia 11.9/36.2, neutrophils WNL, CMP from 3/9/2024 mostly unremarkable.  PLAN:   - Complete 14-day course of p.o. Flagyl 500 mg twice daily and doxycycline 100 mg twice daily which ends today 3/22/2024.  - Medication  education provided and S/S of side effects discussed   - Recommend routine follow up with OB/GYN and PCP  - Education provided on S/S of infection and when to report to ER/ call [phone]     Return visit: PRN. Follow up with primary care physician for chronic medical problems      I have performed a physical exam,  updated ROS and plan today. I have reviewed previous images, labs, and provider notes.      VALENCIA eRyez.    All Patients should seek medical re-evaluation or report to the ER for new, increasing or worsening symptoms. In some circumstances medical conditions can change from the initial evaluation and may require emergent medical re-evaluation. This includes but is not limited to chest pain, shortness of breath, atypical abdominal pain, atypical headache, ALOC, fever >101, low blood pressure, high respiratory rate (above 30), low oxygen saturation (below 90%), acute delirium, abnormal bleeding, inability to tolerate any intake, weakness on one side of the body, any worsened or concerning conditions.    Please note that this dictation was created using voice recognition software. I have worked with technical experts from [employer] to optimize the interface.  I have made every reasonable attempt to correct obvious errors, but there may be errors of grammar and possibly content that I did not discover before finalizing the note.

## 2024-03-22 ENCOUNTER — TELEPHONE (OUTPATIENT)
Dept: OBGYN | Facility: CLINIC | Age: 34
End: 2024-03-22

## 2024-03-22 ENCOUNTER — OFFICE VISIT (OUTPATIENT)
Dept: INFECTIOUS DISEASES | Facility: MEDICAL CENTER | Age: 34
End: 2024-03-22
Attending: NURSE PRACTITIONER
Payer: COMMERCIAL

## 2024-03-22 VITALS
TEMPERATURE: 97.6 F | BODY MASS INDEX: 23.64 KG/M2 | WEIGHT: 156 LBS | OXYGEN SATURATION: 98 % | DIASTOLIC BLOOD PRESSURE: 70 MMHG | HEIGHT: 68 IN | HEART RATE: 76 BPM | SYSTOLIC BLOOD PRESSURE: 110 MMHG

## 2024-03-22 DIAGNOSIS — B96.89 GARDNERELLA VAGINALIS INFECTION: ICD-10-CM

## 2024-03-22 DIAGNOSIS — R78.81 BACTEREMIA: ICD-10-CM

## 2024-03-22 DIAGNOSIS — N76.0 GARDNERELLA VAGINALIS INFECTION: ICD-10-CM

## 2024-03-22 DIAGNOSIS — O03.9 MISCARRIAGE: ICD-10-CM

## 2024-03-22 PROCEDURE — 99211 OFF/OP EST MAY X REQ PHY/QHP: CPT | Performed by: NURSE PRACTITIONER

## 2024-03-22 PROCEDURE — 99213 OFFICE O/P EST LOW 20 MIN: CPT | Performed by: NURSE PRACTITIONER

## 2024-03-22 PROCEDURE — 3074F SYST BP LT 130 MM HG: CPT | Performed by: NURSE PRACTITIONER

## 2024-03-22 PROCEDURE — 3078F DIAST BP <80 MM HG: CPT | Performed by: NURSE PRACTITIONER

## 2024-03-22 ASSESSMENT — ENCOUNTER SYMPTOMS
CHILLS: 0
ABDOMINAL PAIN: 0
SHORTNESS OF BREATH: 0
DOUBLE VISION: 0
SPUTUM PRODUCTION: 0
MYALGIAS: 0
FEVER: 0
NERVOUS/ANXIOUS: 0
WEIGHT LOSS: 0
HEADACHES: 0
COUGH: 0
FOCAL WEAKNESS: 0
BLURRED VISION: 0
BRUISES/BLEEDS EASILY: 0
DIZZINESS: 0
WHEEZING: 0
DIARRHEA: 0
VOMITING: 0
CONSTIPATION: 0
PALPITATIONS: 0
NAUSEA: 1

## 2024-03-22 ASSESSMENT — FIBROSIS 4 INDEX: FIB4 SCORE: 0.52

## 2024-03-22 NOTE — TELEPHONE ENCOUNTER
Called pt and informed her that her ER follow up scheduled on 03/29/24 at our office was scheduled incorrectly. The reason being is that her surgeon was Dr. Lashawn Booker and per guidelines she needs to do her ER follow up with Dr. Galindo at [facility]. Pt stated she rather wants to follow up with us. Stated she has already seen 3 different doctors and wants to see one provider. Pt also stated her  insurance is hard to process and would like to keep everything in one office. I told pt that I needed to consult with my supervisor and will give her a call back to let her know if she is able to keep her ER follow up in our office.     Consulted with my Supervisor Natasha Robles and informed her the above.   Per Natasha it is ok for pt to keep her ER follow up with us on 03/29/2024    Called pt but unable to reach her. Left a VM for pt to return my call. Please inform pt the above when she calls back.

## 2024-03-29 ENCOUNTER — OFFICE VISIT (OUTPATIENT)
Dept: OBGYN | Facility: CLINIC | Age: 34
End: 2024-03-29
Payer: COMMERCIAL

## 2024-03-29 VITALS
BODY MASS INDEX: 23.79 KG/M2 | DIASTOLIC BLOOD PRESSURE: 82 MMHG | HEIGHT: 68 IN | SYSTOLIC BLOOD PRESSURE: 115 MMHG | WEIGHT: 157 LBS

## 2024-03-29 DIAGNOSIS — O03.9 MISCARRIAGE: ICD-10-CM

## 2024-03-29 DIAGNOSIS — O03.87 SPONTANEOUS ABORTION WITH SEPTICEMIA: Primary | ICD-10-CM

## 2024-03-29 LAB
POCT INT CON NEG: NEGATIVE
POCT INT CON POS: POSITIVE
POCT URINE PREGNANCY TEST: NEGATIVE

## 2024-03-29 ASSESSMENT — FIBROSIS 4 INDEX: FIB4 SCORE: 0.52

## 2024-03-29 NOTE — PROGRESS NOTES
PROBLEM GYNECOLOGY VISIT    Chief Complaint  Gynecologic Exam (ER F/U S/P D+C 3/10/2024 with hosp.admit x 4 days)      Subjective  Tiffany Francois is a 33 y.o. female with a past medical history of hypertension on lisinopril HCTZ who presents today for ER follow up after suction D&C for a septic .  She received IV antibiotics including vancomycin and Flagyl.  After the suction D&C, she was transitioned to oral Flagyl and oral doxycycline for total of 14 days.  She has been doing well recently.  She initially had a lot of GI distress with the antibiotics.  She had a few days of bleeding after her surgery, and then she had some light bleeding again yesterday.  She is currently taking Antonette for contraception.  She has been taking Antonette for contraception for approximately the last 4 years, but she stopped taking it in December because of her irregular bleeding.  This was when she got pregnant.  She took a Plan B pill because this was an unplanned pregnancy.  She then had some heavy bleeding and she thought she passed all products of conception until she started having fever and chills, so she presented to the ER for evaluation. She works as an RN.     Preventive Care   Immunization History   Administered Date(s) Administered    Influenza Vaccine Quad Inj (Pf) 2018, 2019, 10/07/2019    Tdap Vaccine 2018     Last Mammogram: NA    Gynecology History and ROS  Current Sexual Activity: Yes  History of sexually transmitted diseases?  no  Abnormal discharge? No  Current Contraception:  Antonette    Pap History  Last pap smear: 2022  History of moderate or severe dysplasia: No    Cancer Risk Assessement:  Family history of:   - Breast cancer: Paternal grandmother at an older age   - Ovarian cancer: paternal great aunt   - Uterine cancer: no   - Colon cancer: no    Obstetric History  OB History    Para Term  AB Living   1 0 0 0 1 0   SAB IAB Ectopic Molar Multiple Live Births   1  0 0 0 0 0      # Outcome Date GA Lbr Hector/2nd Weight Sex Delivery Anes PTL Lv   1 SAB                Past Medical History  Past Medical History:   Diagnosis Date    Hypertension     Migraine        Past Surgical History  Past Surgical History:   Procedure Laterality Date    DILATION AND CURETTAGE N/A 3/10/2024    Procedure: DILATION AND CURETTAGE;  Surgeon: Lashawn Booker M.D.;  Location: SURGERY [facility];  Service: Obstetrics    APPENDECTOMY         Social History  Social History     Socioeconomic History    Marital status: Single     Spouse name: Not on file    Number of children: Not on file    Years of education: Not on file    Highest education level: Bachelor's degree (e.g., BA, AB, BS)   Occupational History    Not on file   Tobacco Use    Smoking status: Never    Smokeless tobacco: Never   Vaping Use    Vaping Use: Never used   Substance and Sexual Activity    Alcohol use: Yes     Alcohol/week: 1.2 - 1.8 oz     Types: 2 - 3 Cans of beer per week     Comment: occassional - twice monthly at most    Drug use: No    Sexual activity: Yes     Partners: Male     Birth control/protection: Pill   Other Topics Concern    Not on file   Social History Narrative    Not on file     Social Determinants of Health     Financial Resource Strain: Low Risk  (10/3/2022)    Overall Financial Resource Strain (CARDIA)     Difficulty of Paying Living Expenses: Not hard at all   Food Insecurity: Unknown (10/3/2022)    Hunger Vital Sign     Worried About Running Out of Food in the Last Year: Never true     Ran Out of Food in the Last Year: Not on file   Transportation Needs: No Transportation Needs (10/3/2022)    PRAPARE - Transportation     Lack of Transportation (Medical): No     Lack of Transportation (Non-Medical): No   Physical Activity: Sufficiently Active (10/3/2022)    Exercise Vital Sign     Days of Exercise per Week: 4 days     Minutes of Exercise per Session: 60 min   Stress: Stress Concern Present (10/3/2022)     [facility] of Occupational Health - Occupational Stress Questionnaire     Feeling of Stress : To some extent   Social Connections: Unknown (10/3/2022)    Social Connection and Isolation Panel [NHANES]     Frequency of Communication with Friends and Family: More than three times a week     Frequency of Social Gatherings with Friends and Family: Twice a week     Attends [employer] Services: Patient declined     Active Member of Clubs or Organizations: No     Attends Club or Organization Meetings: Patient declined     Marital Status: Living with partner   Intimate Partner Violence: Not on file   Housing Stability: High Risk (10/3/2022)    Housing Stability Vital Sign     Unable to Pay for Housing in the Last Year: Yes     Number of Places Lived in the Last Year: 1     Unstable Housing in the Last Year: No       Family History  Family History   Problem Relation Age of Onset    No Known Problems Mother     No Known Problems Father     Hypertension Maternal Aunt     Diabetes Maternal Uncle     Hypertension Maternal Uncle     Breast Cancer Paternal Grandmother     Cancer Paternal Grandmother         breast    Diabetes Paternal Grandfather     Heart Disease Paternal Grandfather     Psychiatric Illness Neg Hx     Colorectal Cancer Neg Hx     Tubal Cancer Neg Hx     Peritoneal Cancer Neg Hx     Ovarian Cancer Neg Hx     Hyperlipidemia Neg Hx     Stroke Neg Hx        Home Medications  Current Outpatient Medications on File Prior to Visit   Medication Sig Dispense Refill    norethindrone (HELEN) 0.35 MG tablet Take 1 Tablet by mouth every day. Indications: Birth Control Treatment      lisinopril-hydrochlorothiazide (PRINZIDE) 10-12.5 MG per tablet Take 1 Tablet by mouth every day. 90 Tablet 1    UBRELVY 50 MG Tab Take 50 mg by mouth as needed (Migraine). Take 1 tablet (50 mg) at onset of migraine. May repeat dose after 2 hours if migraine persists. Max of 2 tablets within 24 hours.      ibuprofen (MOTRIN) 200 MG Tab  "Take 600 mg by mouth every 6 hours as needed for Mild Pain or Headache. 3 tablets = 600 mg.      asa/apap/caffeine (EXCEDRIN EXTRA STRENGTH) 250-250-65 MG Tab Take 2 Tablets by mouth every 6 hours as needed for Headache.      Multiple Vitamins-Minerals (WOMENS MULTIVITAMIN) Tab Take 1 Tablet by mouth every morning.      ondansetron (ZOFRAN ODT) 4 MG TABLET DISPERSIBLE Take 1 Tablet by mouth every 6 hours as needed for Nausea/Vomiting. 10 Tablet 0     No current facility-administered medications on file prior to visit.       Allergies/Reactions  No Known Allergies    ROS  Positive ROS:   Gen: no fevers or chills, no significant weight loss or gain, excessive fatigue  Respiratory:  no cough or dyspnea  Cardiac:  no chest pain, no palpitations, no syncope  Breast: no breast discharge, pain, lump or skin changes  Urinary: no dysuria, urgency, frequency, incontinence   Psych: no depression or anxiety  Neuro: no migraines with aura, fainting spells, numbness or tingling  Extremities: no joint pain, persistently swollen ankles, recurrent leg cramps    Physical Examination:  Vital Signs:   Vitals:    24 0834   BP: 115/82   BP Location: Left arm   Patient Position: Sitting   Weight: 157 lb   Height: 5' 8\"     Body mass index is 23.87 kg/m².    Constitutional: The patient is well developed and well nourished.  Psychiatric: Patient is oriented to time place and person.   Skin: No rash observed.  Neck: Appears symmetric.   Respiratory: normal effort  Breast: Deferred  Abdomen: Non distended  Pelvic Exam: Deferred  Extremeties: Legs are symmetric and without tenderness. There is no edema present.    Assessment & Plan  Tiffany Francois is a 33 y.o. female who presents today for ER follow up     1. Spontaneous  with septicemia  - Finished abx and has been feeling well  - Reviewed pathology from suction D&C  - Taking Antonette for contraception and would like to continue   - Discussed she can resume all normal " activities including sex and taking baths, swimming     Return: Annually or PRN    Selma Powell M.D.

## 2024-04-05 ENCOUNTER — PATIENT MESSAGE (OUTPATIENT)
Dept: MEDICAL GROUP | Facility: PHYSICIAN GROUP | Age: 34
End: 2024-04-05
Payer: COMMERCIAL

## 2024-04-15 RX ORDER — ACETAMINOPHEN AND CODEINE PHOSPHATE 120; 12 MG/5ML; MG/5ML
1 SOLUTION ORAL DAILY
Qty: 84 TABLET | Refills: 3 | Status: SHIPPED | OUTPATIENT
Start: 2024-04-15

## 2024-04-15 NOTE — PATIENT COMMUNICATION
Received request via: Patient    Was the patient seen in the last year in this department? Yes    Does the patient have an active prescription (recently filled or refills available) for medication(s) requested? No    Pharmacy Name: [facility]    Does the patient have care home Plus and need 100 day supply (blood pressure, diabetes and cholesterol meds only)? Patient does not have SCP

## 2024-05-10 ENCOUNTER — APPOINTMENT (OUTPATIENT)
Dept: MEDICAL GROUP | Facility: PHYSICIAN GROUP | Age: 34
End: 2024-05-10
Payer: COMMERCIAL

## 2024-07-03 DIAGNOSIS — I10 ESSENTIAL HYPERTENSION: ICD-10-CM

## 2024-07-11 RX ORDER — LISINOPRIL AND HYDROCHLOROTHIAZIDE 12.5; 1 MG/1; MG/1
1 TABLET ORAL DAILY
Qty: 90 TABLET | Refills: 3 | Status: SHIPPED | OUTPATIENT
Start: 2024-07-11

## 2025-04-07 NOTE — TELEPHONE ENCOUNTER
Received request via: Pharmacy    Was the patient seen in the last year in this department? No    Does the patient have an active prescription (recently filled or refills available) for medication(s) requested? No    Pharmacy Name:   [facility]/pharm[facility] #[id number] - [place] - [address]  [address] [place] [address]  Phone: [phone] Fax: [phone]          Does the patient have USP Plus and need 100-day supply? (This applies to ALL medications) Patient does not have SCP

## 2025-04-08 RX ORDER — ACETAMINOPHEN AND CODEINE PHOSPHATE 120; 12 MG/5ML; MG/5ML
1 SOLUTION ORAL DAILY
Qty: 28 TABLET | Refills: 3 | Status: SHIPPED | OUTPATIENT
Start: 2025-04-08

## (undated) DEVICE — DRESSING NON ADHERENT 3 X 4 - STERILE (100/BX 12BX/CA)

## (undated) DEVICE — Device

## (undated) DEVICE — TUBING CLEARLINK DUO-VENT - C-FLO (48EA/CA)

## (undated) DEVICE — SUCTION INSTRUMENT YANKAUER BULBOUS TIP W/O VENT (50EA/CA)

## (undated) DEVICE — PAD SANITARY 11IN MAXI IND WRAPPED  (12EA/PK 24PK/CA)

## (undated) DEVICE — BAG SPONGE COUNT 10.25 X 32 - BLUE (250/CA)

## (undated) DEVICE — GOWN WARMING STANDARD FLEX - (30/CA)

## (undated) DEVICE — SET EXTENSION WITH 2 PORTS (48EA/CA) ***PART #2C8610 IS A SUBSTITUTE*****

## (undated) DEVICE — CANISTER SUCTION 3000ML MECHANICAL FILTER AUTO SHUTOFF MEDI-VAC NONSTERILE LF DISP  (40EA/CA)

## (undated) DEVICE — LACTATED RINGERS INJ 1000 ML - (14EA/CA 60CA/PF)

## (undated) DEVICE — COVER LIGHT HANDLE ALC PLUS DISP (18EA/BX)

## (undated) DEVICE — BRIEF STRETCH MATERNITY M/L - FITS 20-60IN (5EA/BG 20BG/CA)

## (undated) DEVICE — SET LEADWIRE 5 LEAD BEDSIDE DISPOSABLE ECG (1SET OF 5/EA)

## (undated) DEVICE — TRAY SRGPRP PVP IOD WT PRP - (20/CA)

## (undated) DEVICE — SENSOR OXIMETER ADULT SPO2 RD SET (20EA/BX)

## (undated) DEVICE — GLOVE BIOGEL SZ 6 PF LATEX - (50EA/BX 4BX/CA)

## (undated) DEVICE — VACURETTE 7MM CURVED 10/PKG